# Patient Record
Sex: MALE | Race: BLACK OR AFRICAN AMERICAN
[De-identification: names, ages, dates, MRNs, and addresses within clinical notes are randomized per-mention and may not be internally consistent; named-entity substitution may affect disease eponyms.]

---

## 2018-02-04 ENCOUNTER — HOSPITAL ENCOUNTER (INPATIENT)
Dept: HOSPITAL 26 - MED | Age: 63
LOS: 2 days | Discharge: HOME | DRG: 205 | End: 2018-02-06
Payer: COMMERCIAL

## 2018-02-04 VITALS — SYSTOLIC BLOOD PRESSURE: 112 MMHG | DIASTOLIC BLOOD PRESSURE: 80 MMHG

## 2018-02-04 VITALS — SYSTOLIC BLOOD PRESSURE: 123 MMHG | DIASTOLIC BLOOD PRESSURE: 66 MMHG

## 2018-02-04 VITALS — BODY MASS INDEX: 47.74 KG/M2 | WEIGHT: 315 LBS | HEIGHT: 68 IN

## 2018-02-04 VITALS — SYSTOLIC BLOOD PRESSURE: 109 MMHG | DIASTOLIC BLOOD PRESSURE: 70 MMHG

## 2018-02-04 VITALS — SYSTOLIC BLOOD PRESSURE: 88 MMHG | DIASTOLIC BLOOD PRESSURE: 50 MMHG

## 2018-02-04 DIAGNOSIS — E83.39: ICD-10-CM

## 2018-02-04 DIAGNOSIS — F41.9: ICD-10-CM

## 2018-02-04 DIAGNOSIS — I25.10: ICD-10-CM

## 2018-02-04 DIAGNOSIS — M94.0: Primary | ICD-10-CM

## 2018-02-04 DIAGNOSIS — Z90.49: ICD-10-CM

## 2018-02-04 DIAGNOSIS — E78.00: ICD-10-CM

## 2018-02-04 DIAGNOSIS — N17.0: ICD-10-CM

## 2018-02-04 DIAGNOSIS — F41.0: ICD-10-CM

## 2018-02-04 DIAGNOSIS — Z87.891: ICD-10-CM

## 2018-02-04 DIAGNOSIS — I42.9: ICD-10-CM

## 2018-02-04 DIAGNOSIS — Z86.73: ICD-10-CM

## 2018-02-04 DIAGNOSIS — I25.2: ICD-10-CM

## 2018-02-04 DIAGNOSIS — E78.5: ICD-10-CM

## 2018-02-04 DIAGNOSIS — E66.9: ICD-10-CM

## 2018-02-04 DIAGNOSIS — R73.03: ICD-10-CM

## 2018-02-04 DIAGNOSIS — E87.0: ICD-10-CM

## 2018-02-04 DIAGNOSIS — I50.42: ICD-10-CM

## 2018-02-04 DIAGNOSIS — E43: ICD-10-CM

## 2018-02-04 DIAGNOSIS — F32.9: ICD-10-CM

## 2018-02-04 DIAGNOSIS — J44.9: ICD-10-CM

## 2018-02-04 DIAGNOSIS — Z71.3: ICD-10-CM

## 2018-02-04 DIAGNOSIS — I11.0: ICD-10-CM

## 2018-02-04 LAB
ALBUMIN FLD-MCNC: 3.1 G/DL (ref 3.4–5)
AMYLASE SERPL-CCNC: 104 U/L (ref 25–115)
ANION GAP SERPL CALCULATED.3IONS-SCNC: 14.1 MMOL/L (ref 8–16)
AST SERPL-CCNC: 16 U/L (ref 15–37)
BASOPHILS # BLD AUTO: 0.3 K/UL (ref 0–0.22)
BASOPHILS NFR BLD AUTO: 3.8 % (ref 0–2)
BILIRUB SERPL-MCNC: 0.3 MG/DL (ref 0–1)
BUN SERPL-MCNC: 11 MG/DL (ref 7–18)
CHLORIDE SERPL-SCNC: 111 MMOL/L (ref 98–107)
CHOLEST/HDLC SERPL: 3.7 {RATIO} (ref 1–4.5)
CO2 SERPL-SCNC: 24.8 MMOL/L (ref 21–32)
CREAT SERPL-MCNC: 1.1 MG/DL (ref 0.7–1.3)
EOSINOPHIL # BLD AUTO: 0.1 K/UL (ref 0–0.4)
EOSINOPHIL NFR BLD AUTO: 1.6 % (ref 0–4)
ERYTHROCYTE [DISTWIDTH] IN BLOOD BY AUTOMATED COUNT: 13.4 % (ref 11.6–13.7)
GFR SERPL CREATININE-BSD FRML MDRD: 87 ML/MIN (ref 90–?)
GLUCOSE SERPL-MCNC: 90 MG/DL (ref 74–106)
HCT VFR BLD AUTO: 42 % (ref 36–52)
HDLC SERPL-MCNC: 48 MG/DL (ref 40–60)
HGB BLD-MCNC: 13.4 G/DL (ref 12–18)
LDLC SERPL CALC-MCNC: 113 MG/DL (ref 60–100)
LIPASE SERPL-CCNC: 118 U/L (ref 73–393)
LYMPHOCYTES # BLD AUTO: 2.2 K/UL (ref 2–11.5)
LYMPHOCYTES NFR BLD AUTO: 26.7 % (ref 20.5–51.1)
MAGNESIUM SERPL-MCNC: 2 MG/DL (ref 1.8–2.4)
MCH RBC QN AUTO: 28 PG (ref 27–31)
MCHC RBC AUTO-ENTMCNC: 32 G/DL (ref 33–37)
MCV RBC AUTO: 88 FL (ref 80–94)
MONOCYTES # BLD AUTO: 0.6 K/UL (ref 0.8–1)
MONOCYTES NFR BLD AUTO: 7.4 % (ref 1.7–9.3)
NEUTROPHILS # BLD AUTO: 5.1 K/UL (ref 1.8–7.7)
NEUTROPHILS NFR BLD AUTO: 60.5 % (ref 42.2–75.2)
PHOSPHATE SERPL-MCNC: 2.3 MG/DL (ref 2.5–4.9)
PLATELET # BLD AUTO: 207 K/UL (ref 140–450)
POTASSIUM SERPL-SCNC: 3.9 MMOL/L (ref 3.5–5.1)
PROTHROMBIN TIME: 10.4 SECS (ref 10.8–13.4)
RBC # BLD AUTO: 4.75 MIL/UL (ref 4.2–6.1)
SODIUM SERPL-SCNC: 146 MMOL/L (ref 136–145)
T4 FREE SERPL-MCNC: 0.9 NG/DL (ref 0.76–1.46)
TRIGL SERPL-MCNC: 77 MG/DL (ref 30–150)
TSH SERPL DL<=0.05 MIU/L-ACNC: 0.86 UIU/ML (ref 0.34–3.74)
WBC # BLD AUTO: 8.3 K/UL (ref 4.8–10.8)

## 2018-02-04 RX ADMIN — DOCUSATE SODIUM SCH MG: 100 CAPSULE, LIQUID FILLED ORAL at 20:23

## 2018-02-04 RX ADMIN — SODIUM CHLORIDE SCH MLS/HR: 9 INJECTION, SOLUTION INTRAVENOUS at 16:33

## 2018-02-04 NOTE — NUR
Patient will be admitted to care of DR SHAY . Admited to [facility].  Will go to 
room 111B. Belongings list completed.  Report to SALBADOR RENEE .

## 2018-02-04 NOTE — NUR
PATIENT PRESENTS TO ED WITH C/O CHEST PAIN . PT STATES HE HAS BEEN HAVING CHEST 
PAIN FOR 2 DAYS . PT DESCRIBES PAIN AS STABBING, RADIATING TO HIS LEFT UPPER 
EXTREMITIES. DENIES N/V/D; SKIN IS PINK/WARM/DRY; AAOX4 WITH EVEN AND STEADY 
GAIT; LUNGS CLEAR BL; HR EVEN AND REGULAR; PT DENIES ANY FEVER,  SOB, OR COUGH 
AT THIS TIME; PATIENT STATES PAIN OF 10/10 AT THIS TIME; VSS; PATIENT 
POSITIONED FOR COMFORT; HOB ELEVATED; BEDRAILS UP X2; BED DOWN. ER MD MADE 
AWARE OF PT STATUS.

## 2018-02-04 NOTE — NUR
PATIENT IS RESTING IN BED AWAKE ALERT ORIENTED VITALS SIGNS TAKEN B/P 78/42 HR58 I RECHECKED 
IT AGAIN ITS 88/50 HR 58 PATIENT COMPLAINS OF PAIN TO CHEST AND LT ARM. PATIENT HAS 
COMPLAINS OF SLIGHT SOB.I CHECKED PATIENT'S TROPONIN  LEVEL AND I CALLED RESIDENT AND 
INFORMED MD WEILE OF PATIENT'S CURRENT STATUS AS OF NOW, AND ALSO I QUESTIONED THE TIMING 
FOR THE TROPONIN PATIENT HAD ONE TODAY AND THE NEXT ONE IS SCHEDULED FOR TOMORROW AND 
PATIENT WAS DIAGNOSED WITH CHEST PAIN.MD WEILE SAID HE WILL TAKE A LOOK AT THE PATIENT AND 
SAID ITS OK TO PLACE OXYGEN ON THE PATIENT AT 2L FOR NOW.WILL CONTINUE TO MONITOR.

## 2018-02-04 NOTE — NUR
PATIENT ARRIVED ON UNIT VIA ER BED/GURNEY. ABLE TO AMBULATE FROM ER BED/GURNEY TO [facility] 
BED/GURNEY WITH STEADY GAIT. PATIENT IN STABLE CONDITION. RESPIRATIONS EVEN, UNLABORED, ON 
ROOM AIR. AAOX4, CALM, COOPERATIVE, SKIN COLOR APPROPRIATE TO ETHNICITY, WARM TO TOUCH. 
COMPLAINTS OF 8/10 SHARP WITH PRESSURE CHEST PAIN RADIATING TO LEFT ARM. PATIENT TOOK 1 
NITROGLYCERIN SUBLINGUAL BEFORE COMING TO ER, AND ER GAVE NITROGLYCERIN SUBLINGUAL X2, AND 
MORPHINE. PATIENT REPORTS THAT HELPED THE CHEST PAIN A LITTLE BIT. WILL NOTIFY THE ATTENDING 
MD. LUNGS CTA ON ALL LOBES. ABDOMEN SOFT, OBESE. SKIN IS INTACT THROUGHOUT BODY. IV SITE IS 
INTACT, PATENT, AND STARTED INFUSING IVF AS PER ORDERS. ORIENTED PATIENT TO ROOM. REVIEWED 
PLAN OF CARE WITH PATIENT. SAFETY MEASURES IN PLACE, CALL LIGHT WITHIN REACH. WILL CONTINUE 
TO MONITOR.

## 2018-02-04 NOTE — NUR
DR. SOLORIO AT BEDSIDE PERFORMING ASSESSMENT AND REVIEWING PLAN OF CARE WITH PATIENT. WILL 
CONTINUE TO MONITOR.

## 2018-02-04 NOTE — NUR
PATIENT REFUSED THE SECOND NITRO SL PATIENT STATES,"IT GIVES ME HEADACHES." PATIENT 
STATES,"I WANT SOMETHING FOR MY PAIN PATIENT STATES HIS CHEST PAIN WENT FROM A 10/10 TO 9/10 
BUT PATIENT STATES,"I NEED SOME PAIN MEDICATION THAT IS NOT NORCO BECAUSE IT DOES NOTHING 
FOR MY PAIN. I CALLED MD WEILE AND INFORMED HIM HE SAID HE WILL COME TO SEE THE PATIENT.

## 2018-02-04 NOTE — NUR
LABS DRAWN BY BRIDGER. PATIENT SMILING TALKING AND WATCHING TV. WHEN ASKED IF HIS STILL IN 
PAIN PATIENT STATES,"YES, I MAYBE SMILING BUT I'M STILL HAVING CHEST PAIN 9/10." I TOLD THE 
PATIENT THAT SALBADOR WOODALL WILL ADMINISTER THE MORPHINE IT HAS BEEN VERIFIED BY THE PHARMACY 
ALREADY. PATIENT VERBALIZES UNDERSTANDING.

## 2018-02-04 NOTE — NUR
PATIENT REFUSED TO TAKE HIS STOOL SOFTENER PATIENT STATES I HAVE A BOWEL MOVEMENT YESTERDAY 
AND TODAY.I DON'T NEED ONE.

## 2018-02-05 VITALS — SYSTOLIC BLOOD PRESSURE: 120 MMHG | DIASTOLIC BLOOD PRESSURE: 60 MMHG

## 2018-02-05 VITALS — DIASTOLIC BLOOD PRESSURE: 68 MMHG | SYSTOLIC BLOOD PRESSURE: 133 MMHG

## 2018-02-05 VITALS — SYSTOLIC BLOOD PRESSURE: 109 MMHG | DIASTOLIC BLOOD PRESSURE: 59 MMHG

## 2018-02-05 VITALS — DIASTOLIC BLOOD PRESSURE: 65 MMHG | SYSTOLIC BLOOD PRESSURE: 135 MMHG

## 2018-02-05 VITALS — DIASTOLIC BLOOD PRESSURE: 65 MMHG | SYSTOLIC BLOOD PRESSURE: 113 MMHG

## 2018-02-05 VITALS — SYSTOLIC BLOOD PRESSURE: 101 MMHG | DIASTOLIC BLOOD PRESSURE: 59 MMHG

## 2018-02-05 LAB
ANION GAP SERPL CALCULATED.3IONS-SCNC: 10.6 MMOL/L (ref 8–16)
APPEARANCE UR: CLEAR
BARBITURATES UR QL SCN: (no result) NG/ML
BASOPHILS # BLD AUTO: 0.2 K/UL (ref 0–0.22)
BASOPHILS NFR BLD AUTO: 2.3 % (ref 0–2)
BENZODIAZ UR QL SCN: (no result) NG/ML
BILIRUB UR QL STRIP: NEGATIVE
BUN SERPL-MCNC: 17 MG/DL (ref 7–18)
BZE UR QL SCN: (no result) NG/ML
CANNABINOIDS UR QL SCN: (no result) NG/ML
CHLORIDE SERPL-SCNC: 111 MMOL/L (ref 98–107)
CO2 SERPL-SCNC: 28.8 MMOL/L (ref 21–32)
COLOR UR: YELLOW
CREAT SERPL-MCNC: 1.4 MG/DL (ref 0.7–1.3)
EOSINOPHIL # BLD AUTO: 0.2 K/UL (ref 0–0.4)
EOSINOPHIL NFR BLD AUTO: 2.3 % (ref 0–4)
ERYTHROCYTE [DISTWIDTH] IN BLOOD BY AUTOMATED COUNT: 13.6 % (ref 11.6–13.7)
GFR SERPL CREATININE-BSD FRML MDRD: 66 ML/MIN (ref 90–?)
GLUCOSE SERPL-MCNC: 94 MG/DL (ref 74–106)
GLUCOSE UR STRIP-MCNC: NEGATIVE MG/DL
HCT VFR BLD AUTO: 38.7 % (ref 36–52)
HGB BLD-MCNC: 12.5 G/DL (ref 12–18)
HGB UR QL STRIP: NEGATIVE
HYALINE CASTS URNS QL MICRO: (no result) /LPF
LEUKOCYTE ESTERASE UR QL STRIP: NEGATIVE
LYMPHOCYTES # BLD AUTO: 2.3 K/UL (ref 2–11.5)
LYMPHOCYTES NFR BLD AUTO: 29.5 % (ref 20.5–51.1)
MAGNESIUM SERPL-MCNC: 2.1 MG/DL (ref 1.8–2.4)
MCH RBC QN AUTO: 29 PG (ref 27–31)
MCHC RBC AUTO-ENTMCNC: 32 G/DL (ref 33–37)
MCV RBC AUTO: 90 FL (ref 80–94)
MONOCYTES # BLD AUTO: 0.7 K/UL (ref 0.8–1)
MONOCYTES NFR BLD AUTO: 9 % (ref 1.7–9.3)
NEUTROPHILS # BLD AUTO: 4.5 K/UL (ref 1.8–7.7)
NEUTROPHILS NFR BLD AUTO: 56.9 % (ref 42.2–75.2)
NITRITE UR QL STRIP: NEGATIVE
OPIATES UR QL SCN: (no result) NG/ML
PCP UR QL SCN: (no result) NG/ML
PH UR STRIP: 5.5 [PH] (ref 5–9)
PHOSPHATE SERPL-MCNC: 3.9 MG/DL (ref 2.5–4.9)
PLATELET # BLD AUTO: 174 K/UL (ref 140–450)
POTASSIUM SERPL-SCNC: 4.4 MMOL/L (ref 3.5–5.1)
RBC # BLD AUTO: 4.32 MIL/UL (ref 4.2–6.1)
RBC #/AREA URNS HPF: (no result) /HPF (ref 0–5)
SODIUM SERPL-SCNC: 146 MMOL/L (ref 136–145)
WBC # BLD AUTO: 7.9 K/UL (ref 4.8–10.8)
WBC,URINE: (no result) /HPF (ref 0–5)

## 2018-02-05 RX ADMIN — MORPHINE SULFATE PRN MG: 2 INJECTION, SOLUTION INTRAMUSCULAR; INTRAVENOUS at 12:16

## 2018-02-05 RX ADMIN — Medication SCH MG: at 08:55

## 2018-02-05 RX ADMIN — SODIUM CHLORIDE SCH MLS/HR: 9 INJECTION, SOLUTION INTRAVENOUS at 10:45

## 2018-02-05 RX ADMIN — MORPHINE SULFATE PRN MG: 2 INJECTION, SOLUTION INTRAMUSCULAR; INTRAVENOUS at 15:57

## 2018-02-05 RX ADMIN — GABAPENTIN SCH MG: 100 CAPSULE ORAL at 21:07

## 2018-02-05 RX ADMIN — METOPROLOL SUCCINATE SCH MG: 50 TABLET, EXTENDED RELEASE ORAL at 09:00

## 2018-02-05 RX ADMIN — ATORVASTATIN CALCIUM SCH MG: 20 TABLET, FILM COATED ORAL at 08:55

## 2018-02-05 RX ADMIN — PANTOPRAZOLE SODIUM SCH MG: 40 TABLET, DELAYED RELEASE ORAL at 08:55

## 2018-02-05 RX ADMIN — MORPHINE SULFATE PRN MG: 2 INJECTION, SOLUTION INTRAMUSCULAR; INTRAVENOUS at 09:03

## 2018-02-05 RX ADMIN — Medication SCH MG: at 08:54

## 2018-02-05 RX ADMIN — PANTOPRAZOLE SODIUM SCH MG: 40 TABLET, DELAYED RELEASE ORAL at 21:08

## 2018-02-05 RX ADMIN — DOCUSATE SODIUM SCH MG: 100 CAPSULE, LIQUID FILLED ORAL at 09:00

## 2018-02-05 RX ADMIN — LISINOPRIL SCH MG: 5 TABLET ORAL at 09:00

## 2018-02-05 RX ADMIN — MORPHINE SULFATE PRN MG: 2 INJECTION, SOLUTION INTRAMUSCULAR; INTRAVENOUS at 21:08

## 2018-02-05 RX ADMIN — CLOPIDOGREL BISULFATE SCH MG: 75 TABLET, FILM COATED ORAL at 08:54

## 2018-02-05 RX ADMIN — DOCUSATE SODIUM SCH MG: 100 CAPSULE, LIQUID FILLED ORAL at 21:06

## 2018-02-05 RX ADMIN — GABAPENTIN SCH MG: 100 CAPSULE ORAL at 08:56

## 2018-02-05 NOTE — NUR
2/5/2018

RD INITIAL ASSESSMENT COMPLETED



PLEASE REFER TO NUTRITION ASSESSMENT UNDER CARE ACTIVITY FOR ESTIMATED NUTRITIONAL NEEDS. 



CONTINUE CARDIAC DIET AS TOLERATED 



RD TO PROVIDE NUTRITION EDUCATION AND ENCOURAGE HEART-HEALTHY DIET CHOICES (COMPLETED) 



RD TO FOLLOW-UP IN 2-3 DAYS PATIENT IS HIGH RISK.



LINDSAY HANKS, RD

## 2018-02-05 NOTE — NUR
PER MAURICIO , REF.NUMBER [id number]

RECEIVED A CALL FROM RUDDY FROM [facility].    THEY ARE TOTAL RISK.   FAX REVIEW [phone]   
PHONE [phone].   FAXED INITIAL REVIEW.

## 2018-02-05 NOTE — NUR
PATIENT IS CURRENTLY RESTING IN BED SLEEPING IVF INFUSING WELL IV SITE PATENT. WILL CONTINUE 
TO MONITOR.CALL LIGHT WITHIN REACH.

## 2018-02-05 NOTE — NUR
PATIENT SLEEPING NO DISTRESS MD INFORMED THAT PATIENT HASN'T VOIDED SINCE THE BEGINNING OF 
THE SHIFT AND ALSO I HAVEN'T BEEN ABLE TO COLLECT THE UDS.MD SAID ITS OK.NO NEW ORDER.

## 2018-02-05 NOTE — NUR
PATIENT HAS BEEN SCREENED AND CATEGORIZED AS HIGH NUTRITION RISK. PATIENT WILL BE SEEN 
WITHIN 1-2 DAYS OF ADMISSION.



2/4/19-2/5/18



LINDSAY HANKS RD

## 2018-02-05 NOTE — NUR
PATIENT CURRENTLY SLEEPING WELL VITAL SIGNS TAKEN. PATIENT HAS NO COMPLAINS OF CHEST PAIN AT 
THIS TIME.PATIENT REMINDED THAT WE NEED A UA SPEC WHEN HE VOIDS.PATIENT VERBALIZES 
UNDERSTANDING.SCD'S ON TO BOTH LOWER EXTREMITIES. CALL LIGHT WITHIN REACH.WILL CONTINUE TO 
MONITOR.

## 2018-02-05 NOTE — NUR
RECEIVED REPORT FROM NIGHT SHIFT NURSE. PATIENT IS AAOX4, RESPIRATORY EFFORT EVEN AND 
UNLABORED. NO SIGNS AND SYMPTOMS OF ACUTE DISTRESS NOTED AT THIS TIME. PATIENT COMPLAINING 
OF CHEST PAIN, OFFERED NITROSTAT BUT HE REFUSED, SAYS IT GIVES HIM A BAD HEADACHE. PREFERS 
IV PAIN MED. HAS IV TO RIGHT FOREARM 22G, INFUSING NORMAL SALINE AT 50ML/HR. SITE IS CLEAN, 
DRY, PATENT AND INTACT. DISCUSSED PLAN OF CARE WITH PATIENT AND HE VERBALIZED UNDERSTANDING. 
BED IN LOWEST POSITION, SIDERAILS UP X2, CALL LIGHT PLACED WITHIN REACH. WILL CONTINUE TO 
MONITOR.

## 2018-02-05 NOTE — NUR
RECEIVED PT FROM SILKE RN PT IS AAOX4 RT SIDE WEAKNESS IV ON RT ARM INFUSING WELL ON 
TELMETRY SR PT COME BACK TO SLEEP , DENIES ANY PAIN AT THIS TIME INITIAL ASSESSMENT DONE

## 2018-02-06 VITALS — DIASTOLIC BLOOD PRESSURE: 43 MMHG | SYSTOLIC BLOOD PRESSURE: 108 MMHG

## 2018-02-06 VITALS — DIASTOLIC BLOOD PRESSURE: 82 MMHG | SYSTOLIC BLOOD PRESSURE: 150 MMHG

## 2018-02-06 VITALS — DIASTOLIC BLOOD PRESSURE: 66 MMHG | SYSTOLIC BLOOD PRESSURE: 114 MMHG

## 2018-02-06 LAB
ANION GAP SERPL CALCULATED.3IONS-SCNC: 12.8 MMOL/L (ref 8–16)
BASOPHILS # BLD AUTO: 0.2 K/UL (ref 0–0.22)
BASOPHILS NFR BLD AUTO: 2.1 % (ref 0–2)
BUN SERPL-MCNC: 14 MG/DL (ref 7–18)
CHLORIDE SERPL-SCNC: 111 MMOL/L (ref 98–107)
CO2 SERPL-SCNC: 24.2 MMOL/L (ref 21–32)
CREAT SERPL-MCNC: 1 MG/DL (ref 0.7–1.3)
EOSINOPHIL # BLD AUTO: 0.2 K/UL (ref 0–0.4)
EOSINOPHIL NFR BLD AUTO: 2.1 % (ref 0–4)
ERYTHROCYTE [DISTWIDTH] IN BLOOD BY AUTOMATED COUNT: 13.3 % (ref 11.6–13.7)
GFR SERPL CREATININE-BSD FRML MDRD: 97 ML/MIN (ref 90–?)
GLUCOSE SERPL-MCNC: 91 MG/DL (ref 74–106)
HCT VFR BLD AUTO: 38.8 % (ref 36–52)
HGB BLD-MCNC: 12.7 G/DL (ref 12–18)
LYMPHOCYTES # BLD AUTO: 2.3 K/UL (ref 2–11.5)
LYMPHOCYTES NFR BLD AUTO: 25.1 % (ref 20.5–51.1)
MAGNESIUM SERPL-MCNC: 2.1 MG/DL (ref 1.8–2.4)
MCH RBC QN AUTO: 29 PG (ref 27–31)
MCHC RBC AUTO-ENTMCNC: 33 G/DL (ref 33–37)
MCV RBC AUTO: 88 FL (ref 80–94)
MONOCYTES # BLD AUTO: 0.7 K/UL (ref 0.8–1)
MONOCYTES NFR BLD AUTO: 7.9 % (ref 1.7–9.3)
NEUTROPHILS # BLD AUTO: 5.6 K/UL (ref 1.8–7.7)
NEUTROPHILS NFR BLD AUTO: 62.8 % (ref 42.2–75.2)
PHOSPHATE SERPL-MCNC: 3.2 MG/DL (ref 2.5–4.9)
PLATELET # BLD AUTO: 162 K/UL (ref 140–450)
POTASSIUM SERPL-SCNC: 4 MMOL/L (ref 3.5–5.1)
RBC # BLD AUTO: 4.39 MIL/UL (ref 4.2–6.1)
SODIUM SERPL-SCNC: 144 MMOL/L (ref 136–145)
WBC # BLD AUTO: 9 K/UL (ref 4.8–10.8)

## 2018-02-06 RX ADMIN — SODIUM CHLORIDE SCH MLS/HR: 9 INJECTION, SOLUTION INTRAVENOUS at 01:50

## 2018-02-06 RX ADMIN — DOCUSATE SODIUM SCH MG: 100 CAPSULE, LIQUID FILLED ORAL at 09:10

## 2018-02-06 RX ADMIN — CLOPIDOGREL BISULFATE SCH MG: 75 TABLET, FILM COATED ORAL at 09:09

## 2018-02-06 RX ADMIN — LISINOPRIL SCH MG: 5 TABLET ORAL at 09:09

## 2018-02-06 RX ADMIN — Medication SCH MG: at 09:10

## 2018-02-06 RX ADMIN — MORPHINE SULFATE PRN MG: 2 INJECTION, SOLUTION INTRAMUSCULAR; INTRAVENOUS at 01:54

## 2018-02-06 RX ADMIN — SODIUM CHLORIDE SCH MLS/HR: 9 INJECTION, SOLUTION INTRAVENOUS at 09:17

## 2018-02-06 RX ADMIN — Medication SCH MG: at 09:09

## 2018-02-06 RX ADMIN — ATORVASTATIN CALCIUM SCH MG: 20 TABLET, FILM COATED ORAL at 09:08

## 2018-02-06 RX ADMIN — PANTOPRAZOLE SODIUM SCH MG: 40 TABLET, DELAYED RELEASE ORAL at 09:08

## 2018-02-06 RX ADMIN — METOPROLOL SUCCINATE SCH MG: 50 TABLET, EXTENDED RELEASE ORAL at 09:00

## 2018-02-06 NOTE — NUR
PATIENT LYING IN BED SLEEPING, AROUSABLE BY VOICE. NO DISTRESS NOTED. DENIES ANY PAIN AT 
THIS TIME. SCHEDULED MEDICATIONS DUE GIVEN. SAFETY MEASURES IN PLACE, CALL LIGHT WITHIN 
REACH. WILL CONTINUE TO MONITOR.

## 2018-02-06 NOTE — NUR
PATIENT SITTING IN BED WATCHING TV. NO DISTRESS NOTED. DENIES ANY PAIN AT THIS TIME. PATIENT 
MADE AWARE THAT HE WILL BE DISCHARGED HOME TODAY. PATIENT VERBALIZED UNDERSTANDING AND 
REPORTED THAT HE WILL DRIVE HIMSELF HOME. DISCHARGE INSTRUCTIONS/EDUCATION GIVEN TO PATIENT 
IN PREFERRED LANGUAGE OF ENGLISH. FOLLOW-UP INSTRUCTIONS WITH MD AND OUTPATIENT CARDIOLOGIST 
EDUCATION PROVIDED, NEW/CHANGED MEDICATION REGIMEN, AND CARDIAC DIET REGIMEN PROVIDED. 
ANSWERED ALL OF PATIENT'S QUESTIONS REGARDING DISCHARGE. PATIENT VERBALIZED COMPLETE 
UNDERSTANDING. ID BANDS REMOVED. IV SITE REMOVED WITH MINIMAL BLOOD AND LUMEN COMPLETELY 
INTACT. PATIENT TO GET DRESSED THEN DISCHARGE TO HOME. WILL CONTINUE TO MONITOR.

## 2018-02-06 NOTE — NUR
RECEIVED REPORT FROM NIGHT SHIFT NURSE. PATIENT LYING DOWN IN BED COMFORTABLY, NO DISTRESS 
NOTED. DENIES ANY PAIN AT THIS TIME. RESPIRATIONS EVEN, UNLABORED, ON ROOM AIR. AAOX4, CALM, 
COOPERATIVE, SKIN COLOR APPROPRIATE TO ETHNICITY, WARM TO TOUCH. LUNGS CTA ON ALL LOBES. 
ABDOMEN SOFT, OBESE. SKIN IS INTACT THROUGHOUT BODY. IV SITE IS INTACT, PATENT, AND STARTED 
INFUSING IVF AS PER ORDERS. REVIEWED PLAN OF CARE WITH PATIENT. PATIENT VERBALIZED 
UNDERSTANDING. SAFETY MEASURES IN PLACE, CALL LIGHT WITHIN REACH. WILL CONTINUE TO MONITOR.

## 2018-02-06 NOTE — NUR
PATIENT ALL DRESSED UP AND READY TO GO HOME. ESCORTED PATIENT DOWN TO ER PARKING VIA 
WHEELCHAIR AS PER PATIENT REQUESTED. PATIENT ABLE TO AMBULATE FROM WHEELCHAIR TO OWN CAR. 
PATIENT DISCHARGED AT THIS TIME VIA HOME IN PRIVATE VEHICLE IN STABLE CONDITION.

## 2018-02-06 NOTE — NUR
FAXED CONCURRENT REVIEW TO [facility], INCLUDING DISCHARGE SUMMARY AND MUSIC NOTE [phone].  
 SPOKE WITH RUDDY, [phone] AND INFORMED HER OF THE DISCHARGE.   ALSO FAXED PSYCH 
CONSULT.



SPOKE WITH CAYETANO FROM [facility] AND INFORMED HER OF THE DISCHARGE.   FAXED 
REVIEW TO HER INCLUDING THE DISCHARGE SUMMARY.   [phone]   PHONE [phone].

## 2019-05-13 ENCOUNTER — HOSPITAL ENCOUNTER (EMERGENCY)
Dept: HOSPITAL 15 - ER | Age: 64
Discharge: HOME | End: 2019-05-13
Payer: COMMERCIAL

## 2019-05-13 VITALS — DIASTOLIC BLOOD PRESSURE: 85 MMHG | SYSTOLIC BLOOD PRESSURE: 172 MMHG

## 2019-05-13 VITALS — HEIGHT: 68 IN | WEIGHT: 300 LBS | BODY MASS INDEX: 45.47 KG/M2

## 2019-05-13 DIAGNOSIS — N18.9: ICD-10-CM

## 2019-05-13 DIAGNOSIS — F17.210: ICD-10-CM

## 2019-05-13 DIAGNOSIS — Z79.899: ICD-10-CM

## 2019-05-13 DIAGNOSIS — E11.22: ICD-10-CM

## 2019-05-13 DIAGNOSIS — I13.0: ICD-10-CM

## 2019-05-13 DIAGNOSIS — I50.9: ICD-10-CM

## 2019-05-13 DIAGNOSIS — Z88.6: ICD-10-CM

## 2019-05-13 DIAGNOSIS — R07.89: Primary | ICD-10-CM

## 2019-05-13 LAB
ALBUMIN SERPL-MCNC: 3.4 G/DL (ref 3.4–5)
ALP SERPL-CCNC: 89 U/L (ref 45–117)
ALT SERPL-CCNC: 24 U/L (ref 16–61)
ANION GAP SERPL CALCULATED.3IONS-SCNC: 8 MMOL/L (ref 5–15)
BILIRUB SERPL-MCNC: 0.2 MG/DL (ref 0.2–1)
BUN SERPL-MCNC: 18 MG/DL (ref 7–18)
BUN/CREAT SERPL: 14
CALCIUM SERPL-MCNC: 9 MG/DL (ref 8.5–10.1)
CHLORIDE SERPL-SCNC: 112 MMOL/L (ref 98–107)
CO2 SERPL-SCNC: 26 MMOL/L (ref 21–32)
GLUCOSE SERPL-MCNC: 85 MG/DL (ref 74–106)
HCT VFR BLD AUTO: 46.5 % (ref 41–53)
HGB BLD-MCNC: 15.3 G/DL (ref 13.5–17.5)
MAGNESIUM SERPL-MCNC: 2.4 MG/DL (ref 1.6–2.6)
MCH RBC QN AUTO: 30.3 PG (ref 28–32)
MCV RBC AUTO: 92.2 FL (ref 80–100)
NRBC BLD QL AUTO: 0.1 %
POTASSIUM SERPL-SCNC: 3.9 MMOL/L (ref 3.5–5.1)
PROT SERPL-MCNC: 8 G/DL (ref 6.4–8.2)
SODIUM SERPL-SCNC: 146 MMOL/L (ref 136–145)

## 2019-05-13 PROCEDURE — 84484 ASSAY OF TROPONIN QUANT: CPT

## 2019-05-13 PROCEDURE — 85025 COMPLETE CBC W/AUTO DIFF WBC: CPT

## 2019-05-13 PROCEDURE — 83735 ASSAY OF MAGNESIUM: CPT

## 2019-05-13 PROCEDURE — 80053 COMPREHEN METABOLIC PANEL: CPT

## 2019-05-13 PROCEDURE — 71046 X-RAY EXAM CHEST 2 VIEWS: CPT

## 2019-05-13 PROCEDURE — 36415 COLL VENOUS BLD VENIPUNCTURE: CPT

## 2019-05-13 PROCEDURE — 83880 ASSAY OF NATRIURETIC PEPTIDE: CPT

## 2019-05-13 PROCEDURE — 93005 ELECTROCARDIOGRAM TRACING: CPT

## 2019-05-22 ENCOUNTER — HOSPITAL ENCOUNTER (INPATIENT)
Dept: HOSPITAL 15 - TELE-CENTR | Age: 64
LOS: 2 days | Discharge: HOME | End: 2019-05-24
Payer: COMMERCIAL

## 2019-05-22 DIAGNOSIS — I25.119: ICD-10-CM

## 2019-05-22 DIAGNOSIS — R07.9: Primary | ICD-10-CM

## 2019-05-22 DIAGNOSIS — I13.0: ICD-10-CM

## 2019-05-22 DIAGNOSIS — N18.3: ICD-10-CM

## 2019-05-22 DIAGNOSIS — E66.01: ICD-10-CM

## 2019-06-06 ENCOUNTER — HOSPITAL ENCOUNTER (EMERGENCY)
Dept: HOSPITAL 15 - ER | Age: 64
Discharge: LEFT BEFORE DISCHARGE | End: 2019-06-06
Payer: COMMERCIAL

## 2019-06-06 VITALS — BODY MASS INDEX: 47.74 KG/M2 | WEIGHT: 315 LBS | HEIGHT: 68 IN

## 2019-06-06 VITALS — SYSTOLIC BLOOD PRESSURE: 215 MMHG | DIASTOLIC BLOOD PRESSURE: 106 MMHG

## 2019-06-06 DIAGNOSIS — R07.89: Primary | ICD-10-CM

## 2019-06-06 DIAGNOSIS — Z53.21: ICD-10-CM

## 2019-06-06 LAB
ALBUMIN SERPL-MCNC: 3.3 G/DL (ref 3.4–5)
ALP SERPL-CCNC: 80 U/L (ref 45–117)
ALT SERPL-CCNC: 22 U/L (ref 16–61)
ANION GAP SERPL CALCULATED.3IONS-SCNC: 8 MMOL/L (ref 5–15)
BILIRUB SERPL-MCNC: 0.6 MG/DL (ref 0.2–1)
BUN SERPL-MCNC: 13 MG/DL (ref 7–18)
BUN/CREAT SERPL: 11.3
CALCIUM SERPL-MCNC: 8.7 MG/DL (ref 8.5–10.1)
CHLORIDE SERPL-SCNC: 110 MMOL/L (ref 98–107)
CO2 SERPL-SCNC: 25 MMOL/L (ref 21–32)
GLUCOSE SERPL-MCNC: 98 MG/DL (ref 74–106)
HCT VFR BLD AUTO: 44.9 % (ref 41–53)
HGB BLD-MCNC: 15 G/DL (ref 13.5–17.5)
MAGNESIUM SERPL-MCNC: 2.4 MG/DL (ref 1.6–2.6)
MCH RBC QN AUTO: 30.5 PG (ref 28–32)
MCV RBC AUTO: 91.5 FL (ref 80–100)
NRBC BLD QL AUTO: 0.1 %
POTASSIUM SERPL-SCNC: 3.7 MMOL/L (ref 3.5–5.1)
PROT SERPL-MCNC: 7.8 G/DL (ref 6.4–8.2)
SODIUM SERPL-SCNC: 143 MMOL/L (ref 136–145)

## 2019-06-06 PROCEDURE — 36415 COLL VENOUS BLD VENIPUNCTURE: CPT

## 2019-06-06 PROCEDURE — 84484 ASSAY OF TROPONIN QUANT: CPT

## 2019-06-06 PROCEDURE — 71046 X-RAY EXAM CHEST 2 VIEWS: CPT

## 2019-06-06 PROCEDURE — 80053 COMPREHEN METABOLIC PANEL: CPT

## 2019-06-06 PROCEDURE — 85025 COMPLETE CBC W/AUTO DIFF WBC: CPT

## 2019-06-06 PROCEDURE — 93005 ELECTROCARDIOGRAM TRACING: CPT

## 2019-06-06 PROCEDURE — 83735 ASSAY OF MAGNESIUM: CPT

## 2019-06-21 ENCOUNTER — HOSPITAL ENCOUNTER (EMERGENCY)
Dept: HOSPITAL 15 - ER | Age: 64
Discharge: HOME | End: 2019-06-21
Payer: COMMERCIAL

## 2019-06-21 VITALS — WEIGHT: 260 LBS | BODY MASS INDEX: 39.4 KG/M2 | HEIGHT: 68 IN

## 2019-06-21 VITALS — SYSTOLIC BLOOD PRESSURE: 147 MMHG | DIASTOLIC BLOOD PRESSURE: 66 MMHG

## 2019-06-21 DIAGNOSIS — F17.210: ICD-10-CM

## 2019-06-21 DIAGNOSIS — Z98.61: ICD-10-CM

## 2019-06-21 DIAGNOSIS — I25.2: ICD-10-CM

## 2019-06-21 DIAGNOSIS — R07.89: Primary | ICD-10-CM

## 2019-06-21 DIAGNOSIS — J44.9: ICD-10-CM

## 2019-06-21 DIAGNOSIS — I13.0: ICD-10-CM

## 2019-06-21 DIAGNOSIS — Z90.89: ICD-10-CM

## 2019-06-21 DIAGNOSIS — E66.01: ICD-10-CM

## 2019-06-21 DIAGNOSIS — I50.9: ICD-10-CM

## 2019-06-21 DIAGNOSIS — E13.22: ICD-10-CM

## 2019-06-21 DIAGNOSIS — N18.9: ICD-10-CM

## 2019-06-21 DIAGNOSIS — Z79.899: ICD-10-CM

## 2019-06-21 LAB
ALBUMIN SERPL-MCNC: 3.2 G/DL (ref 3.4–5)
ALP SERPL-CCNC: 78 U/L (ref 45–117)
ALT SERPL-CCNC: 18 U/L (ref 16–61)
ANION GAP SERPL CALCULATED.3IONS-SCNC: 2 MMOL/L (ref 5–15)
BILIRUB SERPL-MCNC: 0.4 MG/DL (ref 0.2–1)
BUN SERPL-MCNC: 17 MG/DL (ref 7–18)
BUN/CREAT SERPL: 16.5
CALCIUM SERPL-MCNC: 8.5 MG/DL (ref 8.5–10.1)
CHLORIDE SERPL-SCNC: 116 MMOL/L (ref 98–107)
CO2 SERPL-SCNC: 26 MMOL/L (ref 21–32)
GLUCOSE SERPL-MCNC: 111 MG/DL (ref 74–106)
HCT VFR BLD AUTO: 43.8 % (ref 41–53)
HGB BLD-MCNC: 14.4 G/DL (ref 13.5–17.5)
MAGNESIUM SERPL-MCNC: 2.4 MG/DL (ref 1.6–2.6)
MCH RBC QN AUTO: 30.2 PG (ref 28–32)
MCV RBC AUTO: 91.7 FL (ref 80–100)
NRBC BLD QL AUTO: 0.1 %
POTASSIUM SERPL-SCNC: 3.9 MMOL/L (ref 3.5–5.1)
PROT SERPL-MCNC: 7.3 G/DL (ref 6.4–8.2)
SODIUM SERPL-SCNC: 144 MMOL/L (ref 136–145)

## 2019-06-21 PROCEDURE — 80053 COMPREHEN METABOLIC PANEL: CPT

## 2019-06-21 PROCEDURE — 83735 ASSAY OF MAGNESIUM: CPT

## 2019-06-21 PROCEDURE — 84484 ASSAY OF TROPONIN QUANT: CPT

## 2019-06-21 PROCEDURE — 99284 EMERGENCY DEPT VISIT MOD MDM: CPT

## 2019-06-21 PROCEDURE — 93005 ELECTROCARDIOGRAM TRACING: CPT

## 2019-06-21 PROCEDURE — 96374 THER/PROPH/DIAG INJ IV PUSH: CPT

## 2019-06-21 PROCEDURE — 94761 N-INVAS EAR/PLS OXIMETRY MLT: CPT

## 2019-06-21 PROCEDURE — 85025 COMPLETE CBC W/AUTO DIFF WBC: CPT

## 2019-06-21 PROCEDURE — 71045 X-RAY EXAM CHEST 1 VIEW: CPT

## 2019-06-21 PROCEDURE — 36415 COLL VENOUS BLD VENIPUNCTURE: CPT

## 2019-06-21 PROCEDURE — 96375 TX/PRO/DX INJ NEW DRUG ADDON: CPT

## 2019-07-04 ENCOUNTER — HOSPITAL ENCOUNTER (INPATIENT)
Dept: HOSPITAL 15 - TELE-WESTW | Age: 64
LOS: 2 days | Discharge: ANOTHER HOSPITAL | DRG: 303 | End: 2019-07-06
Attending: INTERNAL MEDICINE | Admitting: NURSE PRACTITIONER
Payer: COMMERCIAL

## 2019-07-04 VITALS — DIASTOLIC BLOOD PRESSURE: 69 MMHG | SYSTOLIC BLOOD PRESSURE: 138 MMHG

## 2019-07-04 VITALS — DIASTOLIC BLOOD PRESSURE: 94 MMHG | SYSTOLIC BLOOD PRESSURE: 164 MMHG

## 2019-07-04 VITALS — DIASTOLIC BLOOD PRESSURE: 96 MMHG | SYSTOLIC BLOOD PRESSURE: 166 MMHG

## 2019-07-04 VITALS — SYSTOLIC BLOOD PRESSURE: 119 MMHG | DIASTOLIC BLOOD PRESSURE: 75 MMHG

## 2019-07-04 VITALS — DIASTOLIC BLOOD PRESSURE: 75 MMHG | SYSTOLIC BLOOD PRESSURE: 119 MMHG

## 2019-07-04 DIAGNOSIS — G89.29: ICD-10-CM

## 2019-07-04 DIAGNOSIS — E44.1: ICD-10-CM

## 2019-07-04 DIAGNOSIS — I50.9: ICD-10-CM

## 2019-07-04 DIAGNOSIS — E66.01: ICD-10-CM

## 2019-07-04 DIAGNOSIS — E78.5: ICD-10-CM

## 2019-07-04 DIAGNOSIS — Z88.8: ICD-10-CM

## 2019-07-04 DIAGNOSIS — F11.20: ICD-10-CM

## 2019-07-04 DIAGNOSIS — Z82.49: ICD-10-CM

## 2019-07-04 DIAGNOSIS — Z79.02: ICD-10-CM

## 2019-07-04 DIAGNOSIS — Z81.8: ICD-10-CM

## 2019-07-04 DIAGNOSIS — I25.110: Primary | ICD-10-CM

## 2019-07-04 DIAGNOSIS — Z95.5: ICD-10-CM

## 2019-07-04 DIAGNOSIS — Z76.5: ICD-10-CM

## 2019-07-04 DIAGNOSIS — I11.0: ICD-10-CM

## 2019-07-04 DIAGNOSIS — J44.9: ICD-10-CM

## 2019-07-04 DIAGNOSIS — Z79.899: ICD-10-CM

## 2019-07-04 DIAGNOSIS — F32.9: ICD-10-CM

## 2019-07-04 DIAGNOSIS — Z82.3: ICD-10-CM

## 2019-07-04 DIAGNOSIS — Z79.82: ICD-10-CM

## 2019-07-04 PROCEDURE — 80053 COMPREHEN METABOLIC PANEL: CPT

## 2019-07-04 PROCEDURE — 36415 COLL VENOUS BLD VENIPUNCTURE: CPT

## 2019-07-04 PROCEDURE — 84484 ASSAY OF TROPONIN QUANT: CPT

## 2019-07-04 PROCEDURE — 87081 CULTURE SCREEN ONLY: CPT

## 2019-07-04 PROCEDURE — 85025 COMPLETE CBC W/AUTO DIFF WBC: CPT

## 2019-07-04 PROCEDURE — 83880 ASSAY OF NATRIURETIC PEPTIDE: CPT

## 2019-07-04 RX ADMIN — MORPHINE SULFATE PRN MG: 2 INJECTION, SOLUTION INTRAMUSCULAR; INTRAVENOUS at 11:21

## 2019-07-04 RX ADMIN — ALBUTEROL SULFATE SCH CAP: 90 AEROSOL, METERED RESPIRATORY (INHALATION) at 18:00

## 2019-07-04 RX ADMIN — ATORVASTATIN CALCIUM SCH MG: 20 TABLET, FILM COATED ORAL at 22:29

## 2019-07-04 RX ADMIN — ISOSORBIDE MONONITRATE SCH MG: 60 TABLET, EXTENDED RELEASE ORAL at 11:21

## 2019-07-04 RX ADMIN — MORPHINE SULFATE PRN MG: 2 INJECTION, SOLUTION INTRAMUSCULAR; INTRAVENOUS at 15:36

## 2019-07-04 RX ADMIN — LORAZEPAM PRN MG: 2 INJECTION, SOLUTION INTRAMUSCULAR; INTRAVENOUS at 22:31

## 2019-07-04 RX ADMIN — METOPROLOL TARTRATE SCH MG: 25 TABLET, FILM COATED ORAL at 22:30

## 2019-07-04 RX ADMIN — MORPHINE SULFATE PRN MG: 2 INJECTION, SOLUTION INTRAMUSCULAR; INTRAVENOUS at 19:58

## 2019-07-05 VITALS — SYSTOLIC BLOOD PRESSURE: 122 MMHG | DIASTOLIC BLOOD PRESSURE: 79 MMHG

## 2019-07-05 VITALS — SYSTOLIC BLOOD PRESSURE: 137 MMHG | DIASTOLIC BLOOD PRESSURE: 80 MMHG

## 2019-07-05 VITALS — SYSTOLIC BLOOD PRESSURE: 122 MMHG | DIASTOLIC BLOOD PRESSURE: 70 MMHG

## 2019-07-05 VITALS — SYSTOLIC BLOOD PRESSURE: 143 MMHG | DIASTOLIC BLOOD PRESSURE: 71 MMHG

## 2019-07-05 VITALS — DIASTOLIC BLOOD PRESSURE: 70 MMHG | SYSTOLIC BLOOD PRESSURE: 112 MMHG

## 2019-07-05 VITALS — SYSTOLIC BLOOD PRESSURE: 135 MMHG | DIASTOLIC BLOOD PRESSURE: 77 MMHG

## 2019-07-05 VITALS — DIASTOLIC BLOOD PRESSURE: 74 MMHG | SYSTOLIC BLOOD PRESSURE: 133 MMHG

## 2019-07-05 LAB
ALBUMIN SERPL-MCNC: 3.1 G/DL (ref 3.4–5)
ALP SERPL-CCNC: 79 U/L (ref 45–117)
ALT SERPL-CCNC: 20 U/L (ref 16–61)
ANION GAP SERPL CALCULATED.3IONS-SCNC: 7 MMOL/L (ref 5–15)
BILIRUB SERPL-MCNC: 0.9 MG/DL (ref 0.2–1)
BUN SERPL-MCNC: 22 MG/DL (ref 7–18)
BUN/CREAT SERPL: 19.1
CALCIUM SERPL-MCNC: 8.5 MG/DL (ref 8.5–10.1)
CHLORIDE SERPL-SCNC: 113 MMOL/L (ref 98–107)
CO2 SERPL-SCNC: 23 MMOL/L (ref 21–32)
GLUCOSE SERPL-MCNC: 92 MG/DL (ref 74–106)
HCT VFR BLD AUTO: 42.1 % (ref 41–53)
HGB BLD-MCNC: 14 G/DL (ref 13.5–17.5)
MCH RBC QN AUTO: 30.4 PG (ref 28–32)
MCV RBC AUTO: 91.7 FL (ref 80–100)
NRBC BLD QL AUTO: 0.1 %
POTASSIUM SERPL-SCNC: 4 MMOL/L (ref 3.5–5.1)
PROT SERPL-MCNC: 7 G/DL (ref 6.4–8.2)
SODIUM SERPL-SCNC: 143 MMOL/L (ref 136–145)

## 2019-07-05 RX ADMIN — MORPHINE SULFATE PRN MG: 2 INJECTION, SOLUTION INTRAMUSCULAR; INTRAVENOUS at 20:10

## 2019-07-05 RX ADMIN — LORAZEPAM PRN MG: 2 INJECTION, SOLUTION INTRAMUSCULAR; INTRAVENOUS at 22:11

## 2019-07-05 RX ADMIN — MORPHINE SULFATE PRN MG: 2 INJECTION, SOLUTION INTRAMUSCULAR; INTRAVENOUS at 12:07

## 2019-07-05 RX ADMIN — ATORVASTATIN CALCIUM SCH MG: 20 TABLET, FILM COATED ORAL at 22:10

## 2019-07-05 RX ADMIN — MORPHINE SULFATE PRN MG: 2 INJECTION, SOLUTION INTRAMUSCULAR; INTRAVENOUS at 16:32

## 2019-07-05 RX ADMIN — ISOSORBIDE MONONITRATE SCH MG: 60 TABLET, EXTENDED RELEASE ORAL at 09:46

## 2019-07-05 RX ADMIN — METOPROLOL TARTRATE SCH MG: 25 TABLET, FILM COATED ORAL at 22:11

## 2019-07-05 RX ADMIN — ALBUTEROL SULFATE SCH CAP: 90 AEROSOL, METERED RESPIRATORY (INHALATION) at 18:00

## 2019-07-05 RX ADMIN — MORPHINE SULFATE PRN MG: 2 INJECTION, SOLUTION INTRAMUSCULAR; INTRAVENOUS at 02:44

## 2019-07-05 RX ADMIN — MORPHINE SULFATE PRN MG: 2 INJECTION, SOLUTION INTRAMUSCULAR; INTRAVENOUS at 08:09

## 2019-07-05 RX ADMIN — METOPROLOL TARTRATE SCH MG: 25 TABLET, FILM COATED ORAL at 09:47

## 2019-07-06 RX ADMIN — MORPHINE SULFATE PRN MG: 2 INJECTION, SOLUTION INTRAMUSCULAR; INTRAVENOUS at 00:03

## 2019-08-06 ENCOUNTER — HOSPITAL ENCOUNTER (EMERGENCY)
Dept: HOSPITAL 15 - ER | Age: 64
Discharge: HOME | End: 2019-08-06
Payer: COMMERCIAL

## 2019-08-06 VITALS — DIASTOLIC BLOOD PRESSURE: 73 MMHG | SYSTOLIC BLOOD PRESSURE: 184 MMHG

## 2019-08-06 VITALS — HEIGHT: 68 IN | WEIGHT: 315 LBS | BODY MASS INDEX: 47.74 KG/M2

## 2019-08-06 DIAGNOSIS — Z79.01: ICD-10-CM

## 2019-08-06 DIAGNOSIS — I25.2: ICD-10-CM

## 2019-08-06 DIAGNOSIS — J44.9: ICD-10-CM

## 2019-08-06 DIAGNOSIS — N18.9: ICD-10-CM

## 2019-08-06 DIAGNOSIS — Z88.8: ICD-10-CM

## 2019-08-06 DIAGNOSIS — Z79.82: ICD-10-CM

## 2019-08-06 DIAGNOSIS — E78.00: ICD-10-CM

## 2019-08-06 DIAGNOSIS — F17.210: ICD-10-CM

## 2019-08-06 DIAGNOSIS — I50.9: ICD-10-CM

## 2019-08-06 DIAGNOSIS — Z79.899: ICD-10-CM

## 2019-08-06 DIAGNOSIS — I13.0: Primary | ICD-10-CM

## 2019-08-06 DIAGNOSIS — Z90.49: ICD-10-CM

## 2019-08-06 DIAGNOSIS — K21.9: ICD-10-CM

## 2019-08-06 DIAGNOSIS — E11.22: ICD-10-CM

## 2019-08-06 LAB
ALBUMIN SERPL-MCNC: 2.8 G/DL (ref 3.4–5)
ALP SERPL-CCNC: 90 U/L (ref 45–117)
ALT SERPL-CCNC: 17 U/L (ref 16–61)
ANION GAP SERPL CALCULATED.3IONS-SCNC: 8 MMOL/L (ref 5–15)
BILIRUB SERPL-MCNC: 0.3 MG/DL (ref 0.2–1)
BUN SERPL-MCNC: 15 MG/DL (ref 7–18)
BUN/CREAT SERPL: 12.9
CALCIUM SERPL-MCNC: 8.5 MG/DL (ref 8.5–10.1)
CHLORIDE SERPL-SCNC: 113 MMOL/L (ref 98–107)
CO2 SERPL-SCNC: 24 MMOL/L (ref 21–32)
GLUCOSE SERPL-MCNC: 129 MG/DL (ref 74–106)
HCT VFR BLD AUTO: 41.9 % (ref 41–53)
HGB BLD-MCNC: 13.6 G/DL (ref 13.5–17.5)
MAGNESIUM SERPL-MCNC: 2.3 MG/DL (ref 1.6–2.6)
MCH RBC QN AUTO: 29.5 PG (ref 28–32)
MCV RBC AUTO: 91 FL (ref 80–100)
NRBC BLD QL AUTO: 0 %
POTASSIUM SERPL-SCNC: 3.7 MMOL/L (ref 3.5–5.1)
PROT SERPL-MCNC: 7.1 G/DL (ref 6.4–8.2)
SODIUM SERPL-SCNC: 145 MMOL/L (ref 136–145)

## 2019-08-06 PROCEDURE — 83880 ASSAY OF NATRIURETIC PEPTIDE: CPT

## 2019-08-06 PROCEDURE — 96374 THER/PROPH/DIAG INJ IV PUSH: CPT

## 2019-08-06 PROCEDURE — 84484 ASSAY OF TROPONIN QUANT: CPT

## 2019-08-06 PROCEDURE — 94761 N-INVAS EAR/PLS OXIMETRY MLT: CPT

## 2019-08-06 PROCEDURE — 71045 X-RAY EXAM CHEST 1 VIEW: CPT

## 2019-08-06 PROCEDURE — 96375 TX/PRO/DX INJ NEW DRUG ADDON: CPT

## 2019-08-06 PROCEDURE — 83735 ASSAY OF MAGNESIUM: CPT

## 2019-08-06 PROCEDURE — 93005 ELECTROCARDIOGRAM TRACING: CPT

## 2019-08-06 PROCEDURE — 81001 URINALYSIS AUTO W/SCOPE: CPT

## 2019-08-06 PROCEDURE — 80053 COMPREHEN METABOLIC PANEL: CPT

## 2019-08-06 PROCEDURE — 85025 COMPLETE CBC W/AUTO DIFF WBC: CPT

## 2019-08-06 PROCEDURE — 36415 COLL VENOUS BLD VENIPUNCTURE: CPT

## 2019-08-06 PROCEDURE — 99284 EMERGENCY DEPT VISIT MOD MDM: CPT

## 2019-08-10 ENCOUNTER — HOSPITAL ENCOUNTER (INPATIENT)
Dept: HOSPITAL 15 - TELE-CENTR | Age: 64
LOS: 3 days | Discharge: HOME | DRG: 194 | End: 2019-08-13
Attending: INTERNAL MEDICINE | Admitting: NURSE PRACTITIONER
Payer: COMMERCIAL

## 2019-08-10 VITALS — BODY MASS INDEX: 47.74 KG/M2 | WEIGHT: 315 LBS | HEIGHT: 68 IN

## 2019-08-10 VITALS — SYSTOLIC BLOOD PRESSURE: 155 MMHG | DIASTOLIC BLOOD PRESSURE: 90 MMHG

## 2019-08-10 VITALS — DIASTOLIC BLOOD PRESSURE: 90 MMHG | SYSTOLIC BLOOD PRESSURE: 155 MMHG

## 2019-08-10 DIAGNOSIS — Z80.9: ICD-10-CM

## 2019-08-10 DIAGNOSIS — E78.5: ICD-10-CM

## 2019-08-10 DIAGNOSIS — Z79.02: ICD-10-CM
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
## 2019-08-10 DIAGNOSIS — Z79.899: ICD-10-CM

## 2019-08-10 DIAGNOSIS — Z83.3: ICD-10-CM

## 2019-08-10 DIAGNOSIS — G89.29: ICD-10-CM

## 2019-08-10 DIAGNOSIS — F32.9: ICD-10-CM

## 2019-08-10 DIAGNOSIS — T50.2X5A: ICD-10-CM

## 2019-08-10 DIAGNOSIS — J44.9: ICD-10-CM

## 2019-08-10 DIAGNOSIS — I25.10: ICD-10-CM

## 2019-08-10 DIAGNOSIS — Z95.5: ICD-10-CM

## 2019-08-10 DIAGNOSIS — Z81.8: ICD-10-CM

## 2019-08-10 DIAGNOSIS — F11.20: ICD-10-CM

## 2019-08-10 DIAGNOSIS — Z82.49: ICD-10-CM

## 2019-08-10 DIAGNOSIS — E66.01: ICD-10-CM

## 2019-08-10 DIAGNOSIS — Z88.8: ICD-10-CM

## 2019-08-10 DIAGNOSIS — I50.33: ICD-10-CM

## 2019-08-10 DIAGNOSIS — Z83.6: ICD-10-CM

## 2019-08-10 DIAGNOSIS — Z82.3: ICD-10-CM

## 2019-08-10 DIAGNOSIS — Z79.82: ICD-10-CM

## 2019-08-10 DIAGNOSIS — F41.9: ICD-10-CM

## 2019-08-10 DIAGNOSIS — G47.00: ICD-10-CM

## 2019-08-10 DIAGNOSIS — I11.0: Primary | ICD-10-CM

## 2019-08-10 DIAGNOSIS — Z81.1: ICD-10-CM

## 2019-08-10 DIAGNOSIS — Z86.73: ICD-10-CM

## 2019-08-10 PROCEDURE — 83880 ASSAY OF NATRIURETIC PEPTIDE: CPT

## 2019-08-10 PROCEDURE — 71045 X-RAY EXAM CHEST 1 VIEW: CPT

## 2019-08-10 PROCEDURE — 87081 CULTURE SCREEN ONLY: CPT

## 2019-08-10 PROCEDURE — 84484 ASSAY OF TROPONIN QUANT: CPT

## 2019-08-10 PROCEDURE — 36415 COLL VENOUS BLD VENIPUNCTURE: CPT

## 2019-08-10 PROCEDURE — 85025 COMPLETE CBC W/AUTO DIFF WBC: CPT

## 2019-08-10 PROCEDURE — 80048 BASIC METABOLIC PNL TOTAL CA: CPT

## 2019-08-11 VITALS — DIASTOLIC BLOOD PRESSURE: 71 MMHG | SYSTOLIC BLOOD PRESSURE: 125 MMHG

## 2019-08-11 VITALS — DIASTOLIC BLOOD PRESSURE: 90 MMHG | SYSTOLIC BLOOD PRESSURE: 164 MMHG

## 2019-08-11 VITALS — DIASTOLIC BLOOD PRESSURE: 90 MMHG | SYSTOLIC BLOOD PRESSURE: 155 MMHG

## 2019-08-11 VITALS — DIASTOLIC BLOOD PRESSURE: 73 MMHG | SYSTOLIC BLOOD PRESSURE: 133 MMHG

## 2019-08-11 VITALS — DIASTOLIC BLOOD PRESSURE: 92 MMHG | SYSTOLIC BLOOD PRESSURE: 152 MMHG

## 2019-08-11 VITALS — DIASTOLIC BLOOD PRESSURE: 87 MMHG | SYSTOLIC BLOOD PRESSURE: 153 MMHG

## 2019-08-11 VITALS — SYSTOLIC BLOOD PRESSURE: 161 MMHG | DIASTOLIC BLOOD PRESSURE: 87 MMHG

## 2019-08-11 LAB
ANION GAP SERPL CALCULATED.3IONS-SCNC: 9 MMOL/L (ref 5–15)
BUN SERPL-MCNC: 14 MG/DL (ref 7–18)
BUN/CREAT SERPL: 12.4
CALCIUM SERPL-MCNC: 8.3 MG/DL (ref 8.5–10.1)
CHLORIDE SERPL-SCNC: 108 MMOL/L (ref 98–107)
CO2 SERPL-SCNC: 27 MMOL/L (ref 21–32)
GLUCOSE SERPL-MCNC: 86 MG/DL (ref 74–106)
HCT VFR BLD AUTO: 40.4 % (ref 41–53)
HGB BLD-MCNC: 13.4 G/DL (ref 13.5–17.5)
MCH RBC QN AUTO: 30 PG (ref 28–32)
MCV RBC AUTO: 90.8 FL (ref 80–100)
NRBC BLD QL AUTO: 0 %
POTASSIUM SERPL-SCNC: 3.7 MMOL/L (ref 3.5–5.1)
SODIUM SERPL-SCNC: 144 MMOL/L (ref 136–145)

## 2019-08-11 RX ADMIN — MORPHINE SULFATE PRN MG: 2 INJECTION, SOLUTION INTRAMUSCULAR; INTRAVENOUS at 09:26

## 2019-08-11 RX ADMIN — ISOSORBIDE MONONITRATE SCH MG: 60 TABLET, EXTENDED RELEASE ORAL at 09:25

## 2019-08-11 RX ADMIN — METOPROLOL TARTRATE SCH MG: 25 TABLET, FILM COATED ORAL at 09:25

## 2019-08-11 RX ADMIN — BENAZEPRIL HYDROCHLORIDE SCH MG: 10 TABLET, FILM COATED ORAL at 09:26

## 2019-08-11 RX ADMIN — PANTOPRAZOLE SODIUM SCH MG: 40 TABLET, DELAYED RELEASE ORAL at 09:25

## 2019-08-11 RX ADMIN — MORPHINE SULFATE PRN MG: 2 INJECTION, SOLUTION INTRAMUSCULAR; INTRAVENOUS at 01:08

## 2019-08-11 RX ADMIN — MORPHINE SULFATE PRN MG: 2 INJECTION, SOLUTION INTRAMUSCULAR; INTRAVENOUS at 05:18

## 2019-08-11 RX ADMIN — POTASSIUM CHLORIDE SCH MEQ: 1500 TABLET, EXTENDED RELEASE ORAL at 14:25

## 2019-08-11 RX ADMIN — MORPHINE SULFATE PRN MG: 2 INJECTION, SOLUTION INTRAMUSCULAR; INTRAVENOUS at 22:23

## 2019-08-11 RX ADMIN — ATORVASTATIN CALCIUM SCH MG: 20 TABLET, FILM COATED ORAL at 22:17

## 2019-08-11 RX ADMIN — CLOPIDOGREL BISULFATE SCH MG: 75 TABLET ORAL at 09:24

## 2019-08-11 RX ADMIN — FUROSEMIDE SCH MG: 10 INJECTION, SOLUTION INTRAMUSCULAR; INTRAVENOUS at 18:07

## 2019-08-11 RX ADMIN — POTASSIUM CHLORIDE SCH MEQ: 1500 TABLET, EXTENDED RELEASE ORAL at 22:17

## 2019-08-11 RX ADMIN — METOPROLOL TARTRATE SCH MG: 25 TABLET, FILM COATED ORAL at 22:22

## 2019-08-11 RX ADMIN — MORPHINE SULFATE PRN MG: 2 INJECTION, SOLUTION INTRAMUSCULAR; INTRAVENOUS at 16:01

## 2019-08-12 VITALS — DIASTOLIC BLOOD PRESSURE: 76 MMHG | SYSTOLIC BLOOD PRESSURE: 112 MMHG

## 2019-08-12 VITALS — DIASTOLIC BLOOD PRESSURE: 89 MMHG | SYSTOLIC BLOOD PRESSURE: 158 MMHG

## 2019-08-12 VITALS — SYSTOLIC BLOOD PRESSURE: 158 MMHG | DIASTOLIC BLOOD PRESSURE: 89 MMHG

## 2019-08-12 VITALS — SYSTOLIC BLOOD PRESSURE: 126 MMHG | DIASTOLIC BLOOD PRESSURE: 55 MMHG

## 2019-08-12 VITALS — SYSTOLIC BLOOD PRESSURE: 128 MMHG | DIASTOLIC BLOOD PRESSURE: 74 MMHG

## 2019-08-12 VITALS — SYSTOLIC BLOOD PRESSURE: 136 MMHG | DIASTOLIC BLOOD PRESSURE: 74 MMHG

## 2019-08-12 LAB
ANION GAP SERPL CALCULATED.3IONS-SCNC: 5 MMOL/L (ref 5–15)
BUN SERPL-MCNC: 16 MG/DL (ref 7–18)
BUN/CREAT SERPL: 10.7
CALCIUM SERPL-MCNC: 8.9 MG/DL (ref 8.5–10.1)
CHLORIDE SERPL-SCNC: 107 MMOL/L (ref 98–107)
CO2 SERPL-SCNC: 30 MMOL/L (ref 21–32)
GLUCOSE SERPL-MCNC: 101 MG/DL (ref 74–106)
POTASSIUM SERPL-SCNC: 4 MMOL/L (ref 3.5–5.1)
SODIUM SERPL-SCNC: 142 MMOL/L (ref 136–145)

## 2019-08-12 RX ADMIN — METOPROLOL TARTRATE SCH MG: 25 TABLET, FILM COATED ORAL at 09:19

## 2019-08-12 RX ADMIN — METOPROLOL TARTRATE SCH MG: 25 TABLET, FILM COATED ORAL at 22:00

## 2019-08-12 RX ADMIN — FUROSEMIDE SCH MG: 10 INJECTION, SOLUTION INTRAMUSCULAR; INTRAVENOUS at 17:27

## 2019-08-12 RX ADMIN — MORPHINE SULFATE PRN MG: 2 INJECTION, SOLUTION INTRAMUSCULAR; INTRAVENOUS at 04:33

## 2019-08-12 RX ADMIN — POTASSIUM CHLORIDE SCH MEQ: 1500 TABLET, EXTENDED RELEASE ORAL at 10:48

## 2019-08-12 RX ADMIN — FUROSEMIDE SCH MG: 10 INJECTION, SOLUTION INTRAMUSCULAR; INTRAVENOUS at 06:33

## 2019-08-12 RX ADMIN — ATORVASTATIN CALCIUM SCH MG: 20 TABLET, FILM COATED ORAL at 22:08

## 2019-08-12 RX ADMIN — ISOSORBIDE MONONITRATE SCH MG: 60 TABLET, EXTENDED RELEASE ORAL at 09:18

## 2019-08-12 RX ADMIN — POTASSIUM CHLORIDE SCH MEQ: 1500 TABLET, EXTENDED RELEASE ORAL at 06:34

## 2019-08-12 RX ADMIN — BENAZEPRIL HYDROCHLORIDE SCH MG: 10 TABLET, FILM COATED ORAL at 09:18

## 2019-08-12 RX ADMIN — MORPHINE SULFATE PRN MG: 2 INJECTION, SOLUTION INTRAMUSCULAR; INTRAVENOUS at 17:27

## 2019-08-12 RX ADMIN — PANTOPRAZOLE SODIUM SCH MG: 40 TABLET, DELAYED RELEASE ORAL at 09:17

## 2019-08-12 RX ADMIN — MORPHINE SULFATE PRN MG: 2 INJECTION, SOLUTION INTRAMUSCULAR; INTRAVENOUS at 10:47

## 2019-08-12 RX ADMIN — CLOPIDOGREL BISULFATE SCH MG: 75 TABLET ORAL at 09:18

## 2019-08-12 RX ADMIN — POTASSIUM CHLORIDE SCH MEQ: 1500 TABLET, EXTENDED RELEASE ORAL at 22:07

## 2019-08-13 VITALS — DIASTOLIC BLOOD PRESSURE: 84 MMHG | SYSTOLIC BLOOD PRESSURE: 150 MMHG

## 2019-08-13 VITALS — DIASTOLIC BLOOD PRESSURE: 77 MMHG | SYSTOLIC BLOOD PRESSURE: 150 MMHG

## 2019-08-13 LAB
ANION GAP SERPL CALCULATED.3IONS-SCNC: 6 MMOL/L (ref 5–15)
BUN SERPL-MCNC: 20 MG/DL (ref 7–18)
BUN/CREAT SERPL: 12.1
CALCIUM SERPL-MCNC: 9.2 MG/DL (ref 8.5–10.1)
CHLORIDE SERPL-SCNC: 105 MMOL/L (ref 98–107)
CO2 SERPL-SCNC: 31 MMOL/L (ref 21–32)
GLUCOSE SERPL-MCNC: 102 MG/DL (ref 74–106)
POTASSIUM SERPL-SCNC: 4.6 MMOL/L (ref 3.5–5.1)
SODIUM SERPL-SCNC: 142 MMOL/L (ref 136–145)

## 2019-08-13 RX ADMIN — FUROSEMIDE SCH MG: 10 INJECTION, SOLUTION INTRAMUSCULAR; INTRAVENOUS at 06:33

## 2019-08-13 RX ADMIN — MORPHINE SULFATE PRN MG: 2 INJECTION, SOLUTION INTRAMUSCULAR; INTRAVENOUS at 06:34

## 2019-08-13 RX ADMIN — PANTOPRAZOLE SODIUM SCH MG: 40 TABLET, DELAYED RELEASE ORAL at 09:34

## 2019-08-13 RX ADMIN — BENAZEPRIL HYDROCHLORIDE SCH MG: 10 TABLET, FILM COATED ORAL at 09:34

## 2019-08-13 RX ADMIN — ISOSORBIDE MONONITRATE SCH MG: 60 TABLET, EXTENDED RELEASE ORAL at 09:35

## 2019-08-13 RX ADMIN — METOPROLOL TARTRATE SCH MG: 25 TABLET, FILM COATED ORAL at 09:35

## 2019-08-13 RX ADMIN — CLOPIDOGREL BISULFATE SCH MG: 75 TABLET ORAL at 09:35

## 2019-08-13 RX ADMIN — MORPHINE SULFATE PRN MG: 2 INJECTION, SOLUTION INTRAMUSCULAR; INTRAVENOUS at 00:29

## 2019-08-13 RX ADMIN — POTASSIUM CHLORIDE SCH MEQ: 1500 TABLET, EXTENDED RELEASE ORAL at 09:35

## 2019-09-05 ENCOUNTER — HOSPITAL ENCOUNTER (EMERGENCY)
Dept: HOSPITAL 15 - ER | Age: 64
Discharge: HOME | End: 2019-09-05
Payer: COMMERCIAL

## 2019-09-05 VITALS — DIASTOLIC BLOOD PRESSURE: 79 MMHG | SYSTOLIC BLOOD PRESSURE: 165 MMHG

## 2019-09-05 VITALS — BODY MASS INDEX: 47.74 KG/M2 | WEIGHT: 315 LBS | HEIGHT: 68 IN

## 2019-09-05 DIAGNOSIS — E11.22: ICD-10-CM

## 2019-09-05 DIAGNOSIS — Z90.89: ICD-10-CM

## 2019-09-05 DIAGNOSIS — Z90.49: ICD-10-CM

## 2019-09-05 DIAGNOSIS — Z98.61: ICD-10-CM

## 2019-09-05 DIAGNOSIS — I25.2: ICD-10-CM

## 2019-09-05 DIAGNOSIS — N18.9: ICD-10-CM

## 2019-09-05 DIAGNOSIS — R07.89: Primary | ICD-10-CM

## 2019-09-05 DIAGNOSIS — Z79.899: ICD-10-CM

## 2019-09-05 DIAGNOSIS — F17.210: ICD-10-CM

## 2019-09-05 DIAGNOSIS — I13.0: ICD-10-CM

## 2019-09-05 PROCEDURE — 93005 ELECTROCARDIOGRAM TRACING: CPT

## 2019-10-01 ENCOUNTER — HOSPITAL ENCOUNTER (INPATIENT)
Dept: HOSPITAL 15 - TELE-WESTW | Age: 64
LOS: 6 days | Discharge: HOME | DRG: 291 | End: 2019-10-07
Attending: INTERNAL MEDICINE | Admitting: NURSE PRACTITIONER
Payer: COMMERCIAL

## 2019-10-01 VITALS — WEIGHT: 315 LBS | BODY MASS INDEX: 50.62 KG/M2 | HEIGHT: 66 IN

## 2019-10-01 VITALS — DIASTOLIC BLOOD PRESSURE: 80 MMHG | SYSTOLIC BLOOD PRESSURE: 147 MMHG

## 2019-10-01 DIAGNOSIS — J98.11: ICD-10-CM

## 2019-10-01 DIAGNOSIS — I25.10: ICD-10-CM

## 2019-10-01 DIAGNOSIS — J44.1: ICD-10-CM

## 2019-10-01 DIAGNOSIS — I16.9: ICD-10-CM

## 2019-10-01 DIAGNOSIS — J20.9: ICD-10-CM

## 2019-10-01 DIAGNOSIS — Z82.5: ICD-10-CM

## 2019-10-01 DIAGNOSIS — J44.0: ICD-10-CM

## 2019-10-01 DIAGNOSIS — Z82.49: ICD-10-CM

## 2019-10-01 DIAGNOSIS — I25.118: ICD-10-CM

## 2019-10-01 DIAGNOSIS — Z79.82: ICD-10-CM

## 2019-10-01 DIAGNOSIS — Z86.73: ICD-10-CM

## 2019-10-01 DIAGNOSIS — I13.0: Primary | ICD-10-CM

## 2019-10-01 DIAGNOSIS — J45.901: ICD-10-CM

## 2019-10-01 DIAGNOSIS — Z79.02: ICD-10-CM

## 2019-10-01 DIAGNOSIS — J96.21: ICD-10-CM

## 2019-10-01 DIAGNOSIS — I25.2: ICD-10-CM

## 2019-10-01 DIAGNOSIS — E78.5: ICD-10-CM

## 2019-10-01 DIAGNOSIS — G47.00: ICD-10-CM

## 2019-10-01 DIAGNOSIS — N17.9: ICD-10-CM

## 2019-10-01 DIAGNOSIS — I50.43: ICD-10-CM

## 2019-10-01 DIAGNOSIS — G47.33: ICD-10-CM

## 2019-10-01 DIAGNOSIS — G89.4: ICD-10-CM

## 2019-10-01 DIAGNOSIS — Z82.3: ICD-10-CM

## 2019-10-01 DIAGNOSIS — I27.20: ICD-10-CM

## 2019-10-01 DIAGNOSIS — Z79.899: ICD-10-CM

## 2019-10-01 DIAGNOSIS — E66.01: ICD-10-CM

## 2019-10-01 DIAGNOSIS — N18.3: ICD-10-CM

## 2019-10-01 DIAGNOSIS — Z81.8: ICD-10-CM

## 2019-10-01 DIAGNOSIS — Z83.3: ICD-10-CM

## 2019-10-01 DIAGNOSIS — F11.20: ICD-10-CM

## 2019-10-01 DIAGNOSIS — Z95.5: ICD-10-CM

## 2019-10-01 LAB
ANION GAP SERPL CALCULATED.3IONS-SCNC: 9 MMOL/L (ref 5–15)
BUN SERPL-MCNC: 20 MG/DL (ref 7–18)
BUN/CREAT SERPL: 15
CALCIUM SERPL-MCNC: 8.6 MG/DL (ref 8.5–10.1)
CHLORIDE SERPL-SCNC: 114 MMOL/L (ref 98–107)
CO2 SERPL-SCNC: 23 MMOL/L (ref 21–32)
GLUCOSE SERPL-MCNC: 141 MG/DL (ref 74–106)
HCT VFR BLD AUTO: 41.3 % (ref 41–53)
HGB BLD-MCNC: 13.8 G/DL (ref 13.5–17.5)
MCH RBC QN AUTO: 30.2 PG (ref 28–32)
MCV RBC AUTO: 90.2 FL (ref 80–100)
NRBC BLD QL AUTO: 0 %
POTASSIUM SERPL-SCNC: 4.1 MMOL/L (ref 3.5–5.1)
SODIUM SERPL-SCNC: 146 MMOL/L (ref 136–145)

## 2019-10-01 PROCEDURE — 87804 INFLUENZA ASSAY W/OPTIC: CPT

## 2019-10-01 PROCEDURE — 36600 WITHDRAWAL OF ARTERIAL BLOOD: CPT

## 2019-10-01 PROCEDURE — 80053 COMPREHEN METABOLIC PANEL: CPT

## 2019-10-01 PROCEDURE — 71275 CT ANGIOGRAPHY CHEST: CPT

## 2019-10-01 PROCEDURE — 94640 AIRWAY INHALATION TREATMENT: CPT

## 2019-10-01 PROCEDURE — 82805 BLOOD GASES W/O2 SATURATION: CPT

## 2019-10-01 PROCEDURE — 93970 EXTREMITY STUDY: CPT

## 2019-10-01 PROCEDURE — 83036 HEMOGLOBIN GLYCOSYLATED A1C: CPT

## 2019-10-01 PROCEDURE — 71045 X-RAY EXAM CHEST 1 VIEW: CPT

## 2019-10-01 PROCEDURE — 78582 LUNG VENTILAT&PERFUS IMAGING: CPT

## 2019-10-01 PROCEDURE — 85025 COMPLETE CBC W/AUTO DIFF WBC: CPT

## 2019-10-01 PROCEDURE — 83880 ASSAY OF NATRIURETIC PEPTIDE: CPT

## 2019-10-01 PROCEDURE — 80048 BASIC METABOLIC PNL TOTAL CA: CPT

## 2019-10-01 PROCEDURE — 85379 FIBRIN DEGRADATION QUANT: CPT

## 2019-10-01 PROCEDURE — 71046 X-RAY EXAM CHEST 2 VIEWS: CPT

## 2019-10-01 PROCEDURE — 93306 TTE W/DOPPLER COMPLETE: CPT

## 2019-10-01 PROCEDURE — 84484 ASSAY OF TROPONIN QUANT: CPT

## 2019-10-01 PROCEDURE — 36415 COLL VENOUS BLD VENIPUNCTURE: CPT

## 2019-10-01 PROCEDURE — 84443 ASSAY THYROID STIM HORMONE: CPT

## 2019-10-01 RX ADMIN — CEFTRIAXONE SODIUM SCH MLS/HR: 1 INJECTION, POWDER, FOR SOLUTION INTRAMUSCULAR; INTRAVENOUS at 23:58

## 2019-10-01 RX ADMIN — MORPHINE SULFATE PRN MG: 4 INJECTION, SOLUTION INTRAMUSCULAR; INTRAVENOUS at 23:59

## 2019-10-02 VITALS — DIASTOLIC BLOOD PRESSURE: 72 MMHG | SYSTOLIC BLOOD PRESSURE: 132 MMHG

## 2019-10-02 VITALS — DIASTOLIC BLOOD PRESSURE: 82 MMHG | SYSTOLIC BLOOD PRESSURE: 152 MMHG

## 2019-10-02 VITALS — DIASTOLIC BLOOD PRESSURE: 88 MMHG | SYSTOLIC BLOOD PRESSURE: 173 MMHG

## 2019-10-02 VITALS — SYSTOLIC BLOOD PRESSURE: 145 MMHG | DIASTOLIC BLOOD PRESSURE: 76 MMHG

## 2019-10-02 VITALS — SYSTOLIC BLOOD PRESSURE: 117 MMHG | DIASTOLIC BLOOD PRESSURE: 65 MMHG

## 2019-10-02 VITALS — SYSTOLIC BLOOD PRESSURE: 123 MMHG | DIASTOLIC BLOOD PRESSURE: 63 MMHG

## 2019-10-02 VITALS — DIASTOLIC BLOOD PRESSURE: 71 MMHG | SYSTOLIC BLOOD PRESSURE: 127 MMHG

## 2019-10-02 RX ADMIN — MORPHINE SULFATE PRN MG: 4 INJECTION, SOLUTION INTRAMUSCULAR; INTRAVENOUS at 20:44

## 2019-10-02 RX ADMIN — ALBUTEROL SULFATE SCH MG: 2.5 SOLUTION RESPIRATORY (INHALATION) at 19:49

## 2019-10-02 RX ADMIN — IPRATROPIUM BROMIDE SCH MG: 0.5 SOLUTION RESPIRATORY (INHALATION) at 12:34

## 2019-10-02 RX ADMIN — SODIUM CHLORIDE SCH ML: 9 INJECTION INTRAMUSCULAR; INTRAVENOUS; SUBCUTANEOUS at 05:38

## 2019-10-02 RX ADMIN — MORPHINE SULFATE PRN MG: 4 INJECTION, SOLUTION INTRAMUSCULAR; INTRAVENOUS at 04:13

## 2019-10-02 RX ADMIN — RANOLAZINE SCH MG: 500 TABLET, FILM COATED, EXTENDED RELEASE ORAL at 22:08

## 2019-10-02 RX ADMIN — MORPHINE SULFATE PRN MG: 4 INJECTION, SOLUTION INTRAMUSCULAR; INTRAVENOUS at 16:34

## 2019-10-02 RX ADMIN — AZITHROMYCIN DIHYDRATE SCH MLS/HR: 500 INJECTION, POWDER, LYOPHILIZED, FOR SOLUTION INTRAVENOUS at 09:52

## 2019-10-02 RX ADMIN — ALBUTEROL SULFATE SCH MG: 2.5 SOLUTION RESPIRATORY (INHALATION) at 06:52

## 2019-10-02 RX ADMIN — POTASSIUM CHLORIDE SCH MEQ: 600 TABLET, FILM COATED, EXTENDED RELEASE ORAL at 22:07

## 2019-10-02 RX ADMIN — RANOLAZINE SCH MG: 500 TABLET, FILM COATED, EXTENDED RELEASE ORAL at 09:52

## 2019-10-02 RX ADMIN — HYDROCODONE BITARTRATE AND ACETAMINOPHEN PRN TAB: 5; 325 TABLET ORAL at 02:23

## 2019-10-02 RX ADMIN — ALBUTEROL SULFATE SCH MG: 2.5 SOLUTION RESPIRATORY (INHALATION) at 12:34

## 2019-10-02 RX ADMIN — ISOSORBIDE MONONITRATE SCH MG: 60 TABLET, EXTENDED RELEASE ORAL at 09:54

## 2019-10-02 RX ADMIN — MORPHINE SULFATE PRN MG: 4 INJECTION, SOLUTION INTRAMUSCULAR; INTRAVENOUS at 12:17

## 2019-10-02 RX ADMIN — METOPROLOL TARTRATE SCH MG: 25 TABLET, FILM COATED ORAL at 09:53

## 2019-10-02 RX ADMIN — MORPHINE SULFATE PRN MG: 4 INJECTION, SOLUTION INTRAMUSCULAR; INTRAVENOUS at 08:16

## 2019-10-02 RX ADMIN — CLOPIDOGREL BISULFATE SCH MG: 75 TABLET ORAL at 09:52

## 2019-10-02 RX ADMIN — SODIUM CHLORIDE PRN MG: 0.9 INJECTION, SOLUTION INTRAVENOUS at 06:59

## 2019-10-02 RX ADMIN — SODIUM CHLORIDE SCH ML: 9 INJECTION INTRAMUSCULAR; INTRAVENOUS; SUBCUTANEOUS at 22:07

## 2019-10-02 RX ADMIN — AZITHROMYCIN DIHYDRATE SCH MLS/HR: 500 INJECTION, POWDER, LYOPHILIZED, FOR SOLUTION INTRAVENOUS at 02:02

## 2019-10-02 RX ADMIN — SODIUM CHLORIDE SCH ML: 9 INJECTION INTRAMUSCULAR; INTRAVENOUS; SUBCUTANEOUS at 13:41

## 2019-10-02 RX ADMIN — IPRATROPIUM BROMIDE SCH MG: 0.5 SOLUTION RESPIRATORY (INHALATION) at 19:49

## 2019-10-02 RX ADMIN — FUROSEMIDE SCH MG: 20 TABLET ORAL at 17:34

## 2019-10-02 RX ADMIN — METOPROLOL TARTRATE SCH MG: 25 TABLET, FILM COATED ORAL at 22:00

## 2019-10-02 RX ADMIN — CEFTRIAXONE SODIUM SCH MLS/HR: 1 INJECTION, POWDER, FOR SOLUTION INTRAMUSCULAR; INTRAVENOUS at 08:46

## 2019-10-02 RX ADMIN — IPRATROPIUM BROMIDE SCH MG: 0.5 SOLUTION RESPIRATORY (INHALATION) at 06:52

## 2019-10-02 RX ADMIN — ENALAPRIL MALEATE SCH MG: 2.5 TABLET ORAL at 09:54

## 2019-10-03 VITALS — DIASTOLIC BLOOD PRESSURE: 85 MMHG | SYSTOLIC BLOOD PRESSURE: 147 MMHG

## 2019-10-03 VITALS — SYSTOLIC BLOOD PRESSURE: 150 MMHG | DIASTOLIC BLOOD PRESSURE: 81 MMHG

## 2019-10-03 VITALS — DIASTOLIC BLOOD PRESSURE: 67 MMHG | SYSTOLIC BLOOD PRESSURE: 145 MMHG

## 2019-10-03 VITALS — SYSTOLIC BLOOD PRESSURE: 142 MMHG | DIASTOLIC BLOOD PRESSURE: 78 MMHG

## 2019-10-03 VITALS — DIASTOLIC BLOOD PRESSURE: 80 MMHG | SYSTOLIC BLOOD PRESSURE: 141 MMHG

## 2019-10-03 VITALS — DIASTOLIC BLOOD PRESSURE: 78 MMHG | SYSTOLIC BLOOD PRESSURE: 142 MMHG

## 2019-10-03 LAB
ALBUMIN SERPL-MCNC: 3 G/DL (ref 3.4–5)
ALP SERPL-CCNC: 72 U/L (ref 45–117)
ALT SERPL-CCNC: 19 U/L (ref 16–61)
ANION GAP SERPL CALCULATED.3IONS-SCNC: 3 MMOL/L (ref 5–15)
BILIRUB SERPL-MCNC: 0.6 MG/DL (ref 0.2–1)
BUN SERPL-MCNC: 21 MG/DL (ref 7–18)
BUN/CREAT SERPL: 15.4
CALCIUM SERPL-MCNC: 8.5 MG/DL (ref 8.5–10.1)
CHLORIDE SERPL-SCNC: 113 MMOL/L (ref 98–107)
CO2 SERPL-SCNC: 29 MMOL/L (ref 21–32)
GLUCOSE SERPL-MCNC: 87 MG/DL (ref 74–106)
HCT VFR BLD AUTO: 39.5 % (ref 41–53)
HGB BLD-MCNC: 13 G/DL (ref 13.5–17.5)
MCH RBC QN AUTO: 30.5 PG (ref 28–32)
MCV RBC AUTO: 92.8 FL (ref 80–100)
NRBC BLD QL AUTO: 0 %
POTASSIUM SERPL-SCNC: 4.2 MMOL/L (ref 3.5–5.1)
PROT SERPL-MCNC: 6.9 G/DL (ref 6.4–8.2)
SODIUM SERPL-SCNC: 145 MMOL/L (ref 136–145)

## 2019-10-03 RX ADMIN — MORPHINE SULFATE PRN MG: 4 INJECTION, SOLUTION INTRAMUSCULAR; INTRAVENOUS at 13:36

## 2019-10-03 RX ADMIN — AZITHROMYCIN MONOHYDRATE SCH MG: 250 TABLET ORAL at 09:25

## 2019-10-03 RX ADMIN — MORPHINE SULFATE PRN MG: 4 INJECTION, SOLUTION INTRAMUSCULAR; INTRAVENOUS at 09:27

## 2019-10-03 RX ADMIN — SODIUM CHLORIDE SCH ML: 9 INJECTION INTRAMUSCULAR; INTRAVENOUS; SUBCUTANEOUS at 13:35

## 2019-10-03 RX ADMIN — RANOLAZINE SCH MG: 500 TABLET, FILM COATED, EXTENDED RELEASE ORAL at 09:36

## 2019-10-03 RX ADMIN — ALBUTEROL SULFATE SCH MG: 2.5 SOLUTION RESPIRATORY (INHALATION) at 06:53

## 2019-10-03 RX ADMIN — MORPHINE SULFATE PRN MG: 4 INJECTION, SOLUTION INTRAMUSCULAR; INTRAVENOUS at 18:11

## 2019-10-03 RX ADMIN — MORPHINE SULFATE PRN MG: 4 INJECTION, SOLUTION INTRAMUSCULAR; INTRAVENOUS at 02:49

## 2019-10-03 RX ADMIN — IPRATROPIUM BROMIDE SCH MG: 0.5 SOLUTION RESPIRATORY (INHALATION) at 12:48

## 2019-10-03 RX ADMIN — SODIUM CHLORIDE SCH ML: 9 INJECTION INTRAMUSCULAR; INTRAVENOUS; SUBCUTANEOUS at 22:08

## 2019-10-03 RX ADMIN — IPRATROPIUM BROMIDE SCH MG: 0.5 SOLUTION RESPIRATORY (INHALATION) at 18:23

## 2019-10-03 RX ADMIN — CLOPIDOGREL BISULFATE SCH MG: 75 TABLET ORAL at 09:25

## 2019-10-03 RX ADMIN — MORPHINE SULFATE PRN MG: 4 INJECTION, SOLUTION INTRAMUSCULAR; INTRAVENOUS at 22:12

## 2019-10-03 RX ADMIN — ALBUTEROL SULFATE SCH MG: 2.5 SOLUTION RESPIRATORY (INHALATION) at 12:48

## 2019-10-03 RX ADMIN — POTASSIUM CHLORIDE SCH MEQ: 600 TABLET, FILM COATED, EXTENDED RELEASE ORAL at 09:25

## 2019-10-03 RX ADMIN — CEFTRIAXONE SODIUM SCH MLS/HR: 1 INJECTION, POWDER, FOR SOLUTION INTRAMUSCULAR; INTRAVENOUS at 09:25

## 2019-10-03 RX ADMIN — FUROSEMIDE SCH MG: 20 TABLET ORAL at 05:36

## 2019-10-03 RX ADMIN — SODIUM CHLORIDE SCH ML: 9 INJECTION INTRAMUSCULAR; INTRAVENOUS; SUBCUTANEOUS at 13:36

## 2019-10-03 RX ADMIN — IPRATROPIUM BROMIDE SCH MG: 0.5 SOLUTION RESPIRATORY (INHALATION) at 06:53

## 2019-10-03 RX ADMIN — METOPROLOL TARTRATE SCH MG: 25 TABLET, FILM COATED ORAL at 09:36

## 2019-10-03 RX ADMIN — ALBUTEROL SULFATE SCH MG: 2.5 SOLUTION RESPIRATORY (INHALATION) at 18:23

## 2019-10-03 RX ADMIN — METOPROLOL TARTRATE SCH MG: 25 TABLET, FILM COATED ORAL at 22:00

## 2019-10-03 RX ADMIN — ENALAPRIL MALEATE SCH MG: 2.5 TABLET ORAL at 09:27

## 2019-10-03 RX ADMIN — ISOSORBIDE MONONITRATE SCH MG: 60 TABLET, EXTENDED RELEASE ORAL at 09:26

## 2019-10-03 RX ADMIN — RANOLAZINE SCH MG: 500 TABLET, FILM COATED, EXTENDED RELEASE ORAL at 22:08

## 2019-10-04 VITALS — DIASTOLIC BLOOD PRESSURE: 86 MMHG | SYSTOLIC BLOOD PRESSURE: 159 MMHG

## 2019-10-04 VITALS — DIASTOLIC BLOOD PRESSURE: 91 MMHG | SYSTOLIC BLOOD PRESSURE: 160 MMHG

## 2019-10-04 VITALS — SYSTOLIC BLOOD PRESSURE: 148 MMHG | DIASTOLIC BLOOD PRESSURE: 79 MMHG

## 2019-10-04 VITALS — DIASTOLIC BLOOD PRESSURE: 72 MMHG | SYSTOLIC BLOOD PRESSURE: 123 MMHG

## 2019-10-04 VITALS — SYSTOLIC BLOOD PRESSURE: 145 MMHG | DIASTOLIC BLOOD PRESSURE: 61 MMHG

## 2019-10-04 RX ADMIN — CLOPIDOGREL BISULFATE SCH MG: 75 TABLET ORAL at 09:42

## 2019-10-04 RX ADMIN — MORPHINE SULFATE PRN MG: 4 INJECTION, SOLUTION INTRAMUSCULAR; INTRAVENOUS at 18:36

## 2019-10-04 RX ADMIN — ENALAPRIL MALEATE SCH MG: 2.5 TABLET ORAL at 09:43

## 2019-10-04 RX ADMIN — METOPROLOL TARTRATE SCH MG: 25 TABLET, FILM COATED ORAL at 09:43

## 2019-10-04 RX ADMIN — IPRATROPIUM BROMIDE SCH MG: 0.5 SOLUTION RESPIRATORY (INHALATION) at 19:19

## 2019-10-04 RX ADMIN — ALBUTEROL SULFATE SCH MG: 2.5 SOLUTION RESPIRATORY (INHALATION) at 13:20

## 2019-10-04 RX ADMIN — MORPHINE SULFATE PRN MG: 4 INJECTION, SOLUTION INTRAMUSCULAR; INTRAVENOUS at 14:01

## 2019-10-04 RX ADMIN — SODIUM CHLORIDE SCH ML: 9 INJECTION INTRAMUSCULAR; INTRAVENOUS; SUBCUTANEOUS at 14:01

## 2019-10-04 RX ADMIN — ISOSORBIDE MONONITRATE SCH MG: 60 TABLET, EXTENDED RELEASE ORAL at 09:43

## 2019-10-04 RX ADMIN — SODIUM CHLORIDE SCH ML: 9 INJECTION INTRAMUSCULAR; INTRAVENOUS; SUBCUTANEOUS at 06:00

## 2019-10-04 RX ADMIN — SODIUM CHLORIDE SCH ML: 9 INJECTION INTRAMUSCULAR; INTRAVENOUS; SUBCUTANEOUS at 21:37

## 2019-10-04 RX ADMIN — ALBUTEROL SULFATE SCH MG: 2.5 SOLUTION RESPIRATORY (INHALATION) at 19:19

## 2019-10-04 RX ADMIN — MORPHINE SULFATE PRN MG: 4 INJECTION, SOLUTION INTRAMUSCULAR; INTRAVENOUS at 23:23

## 2019-10-04 RX ADMIN — MORPHINE SULFATE PRN MG: 4 INJECTION, SOLUTION INTRAMUSCULAR; INTRAVENOUS at 09:44

## 2019-10-04 RX ADMIN — METOPROLOL TARTRATE SCH MG: 25 TABLET, FILM COATED ORAL at 21:37

## 2019-10-04 RX ADMIN — AZITHROMYCIN MONOHYDRATE SCH MG: 250 TABLET ORAL at 09:42

## 2019-10-04 RX ADMIN — RANOLAZINE SCH MG: 500 TABLET, FILM COATED, EXTENDED RELEASE ORAL at 21:38

## 2019-10-04 RX ADMIN — IPRATROPIUM BROMIDE SCH MG: 0.5 SOLUTION RESPIRATORY (INHALATION) at 07:35

## 2019-10-04 RX ADMIN — IPRATROPIUM BROMIDE SCH MG: 0.5 SOLUTION RESPIRATORY (INHALATION) at 13:19

## 2019-10-04 RX ADMIN — SODIUM CHLORIDE PRN MG: 0.9 INJECTION, SOLUTION INTRAVENOUS at 10:54

## 2019-10-04 RX ADMIN — RANOLAZINE SCH MG: 500 TABLET, FILM COATED, EXTENDED RELEASE ORAL at 09:42

## 2019-10-04 RX ADMIN — ALBUTEROL SULFATE SCH MG: 2.5 SOLUTION RESPIRATORY (INHALATION) at 07:35

## 2019-10-04 RX ADMIN — CEFTRIAXONE SODIUM SCH MLS/HR: 1 INJECTION, POWDER, FOR SOLUTION INTRAMUSCULAR; INTRAVENOUS at 09:42

## 2019-10-04 RX ADMIN — MORPHINE SULFATE PRN MG: 4 INJECTION, SOLUTION INTRAMUSCULAR; INTRAVENOUS at 04:49

## 2019-10-05 VITALS — SYSTOLIC BLOOD PRESSURE: 167 MMHG | DIASTOLIC BLOOD PRESSURE: 95 MMHG

## 2019-10-05 VITALS — SYSTOLIC BLOOD PRESSURE: 176 MMHG | DIASTOLIC BLOOD PRESSURE: 97 MMHG

## 2019-10-05 VITALS — SYSTOLIC BLOOD PRESSURE: 155 MMHG | DIASTOLIC BLOOD PRESSURE: 83 MMHG

## 2019-10-05 VITALS — DIASTOLIC BLOOD PRESSURE: 54 MMHG | SYSTOLIC BLOOD PRESSURE: 148 MMHG

## 2019-10-05 VITALS — DIASTOLIC BLOOD PRESSURE: 75 MMHG | SYSTOLIC BLOOD PRESSURE: 152 MMHG

## 2019-10-05 VITALS — DIASTOLIC BLOOD PRESSURE: 69 MMHG | SYSTOLIC BLOOD PRESSURE: 140 MMHG

## 2019-10-05 VITALS — DIASTOLIC BLOOD PRESSURE: 79 MMHG | SYSTOLIC BLOOD PRESSURE: 148 MMHG

## 2019-10-05 VITALS — DIASTOLIC BLOOD PRESSURE: 78 MMHG | SYSTOLIC BLOOD PRESSURE: 171 MMHG

## 2019-10-05 LAB
ANION GAP SERPL CALCULATED.3IONS-SCNC: 5 MMOL/L (ref 5–15)
BUN SERPL-MCNC: 16 MG/DL (ref 7–18)
BUN/CREAT SERPL: 12.9
CALCIUM SERPL-MCNC: 8.6 MG/DL (ref 8.5–10.1)
CHLORIDE SERPL-SCNC: 110 MMOL/L (ref 98–107)
CO2 SERPL-SCNC: 28 MMOL/L (ref 21–32)
GLUCOSE SERPL-MCNC: 85 MG/DL (ref 74–106)
POTASSIUM SERPL-SCNC: 4 MMOL/L (ref 3.5–5.1)
SODIUM SERPL-SCNC: 143 MMOL/L (ref 136–145)

## 2019-10-05 RX ADMIN — ALBUTEROL SULFATE SCH MG: 2.5 SOLUTION RESPIRATORY (INHALATION) at 12:11

## 2019-10-05 RX ADMIN — SODIUM CHLORIDE PRN MG: 0.9 INJECTION, SOLUTION INTRAVENOUS at 10:57

## 2019-10-05 RX ADMIN — MORPHINE SULFATE PRN MG: 4 INJECTION, SOLUTION INTRAMUSCULAR; INTRAVENOUS at 12:35

## 2019-10-05 RX ADMIN — MORPHINE SULFATE PRN MG: 4 INJECTION, SOLUTION INTRAMUSCULAR; INTRAVENOUS at 17:09

## 2019-10-05 RX ADMIN — MORPHINE SULFATE PRN MG: 4 INJECTION, SOLUTION INTRAMUSCULAR; INTRAVENOUS at 21:29

## 2019-10-05 RX ADMIN — AZITHROMYCIN MONOHYDRATE SCH MG: 250 TABLET ORAL at 09:17

## 2019-10-05 RX ADMIN — METOPROLOL TARTRATE SCH MG: 25 TABLET, FILM COATED ORAL at 22:36

## 2019-10-05 RX ADMIN — CEFTRIAXONE SODIUM SCH MLS/HR: 1 INJECTION, POWDER, FOR SOLUTION INTRAMUSCULAR; INTRAVENOUS at 09:17

## 2019-10-05 RX ADMIN — IPRATROPIUM BROMIDE SCH MG: 0.5 SOLUTION RESPIRATORY (INHALATION) at 07:29

## 2019-10-05 RX ADMIN — ALBUTEROL SULFATE SCH MG: 2.5 SOLUTION RESPIRATORY (INHALATION) at 18:14

## 2019-10-05 RX ADMIN — MORPHINE SULFATE PRN MG: 4 INJECTION, SOLUTION INTRAMUSCULAR; INTRAVENOUS at 04:24

## 2019-10-05 RX ADMIN — IPRATROPIUM BROMIDE SCH MG: 0.5 SOLUTION RESPIRATORY (INHALATION) at 18:14

## 2019-10-05 RX ADMIN — ISOSORBIDE MONONITRATE SCH MG: 60 TABLET, EXTENDED RELEASE ORAL at 09:18

## 2019-10-05 RX ADMIN — MORPHINE SULFATE PRN MG: 4 INJECTION, SOLUTION INTRAMUSCULAR; INTRAVENOUS at 08:31

## 2019-10-05 RX ADMIN — ALBUTEROL SULFATE SCH MG: 2.5 SOLUTION RESPIRATORY (INHALATION) at 07:29

## 2019-10-05 RX ADMIN — CLOPIDOGREL BISULFATE SCH MG: 75 TABLET ORAL at 09:17

## 2019-10-05 RX ADMIN — IPRATROPIUM BROMIDE SCH MG: 0.5 SOLUTION RESPIRATORY (INHALATION) at 12:11

## 2019-10-05 RX ADMIN — SODIUM CHLORIDE SCH ML: 9 INJECTION INTRAMUSCULAR; INTRAVENOUS; SUBCUTANEOUS at 05:52

## 2019-10-05 RX ADMIN — RANOLAZINE SCH MG: 500 TABLET, FILM COATED, EXTENDED RELEASE ORAL at 21:28

## 2019-10-05 RX ADMIN — RANOLAZINE SCH MG: 500 TABLET, FILM COATED, EXTENDED RELEASE ORAL at 09:18

## 2019-10-05 RX ADMIN — SODIUM CHLORIDE SCH ML: 9 INJECTION INTRAMUSCULAR; INTRAVENOUS; SUBCUTANEOUS at 14:04

## 2019-10-05 RX ADMIN — METOPROLOL TARTRATE SCH MG: 25 TABLET, FILM COATED ORAL at 09:18

## 2019-10-05 RX ADMIN — SODIUM CHLORIDE SCH ML: 9 INJECTION INTRAMUSCULAR; INTRAVENOUS; SUBCUTANEOUS at 21:28

## 2019-10-06 VITALS — SYSTOLIC BLOOD PRESSURE: 155 MMHG | DIASTOLIC BLOOD PRESSURE: 69 MMHG

## 2019-10-06 VITALS — DIASTOLIC BLOOD PRESSURE: 77 MMHG | SYSTOLIC BLOOD PRESSURE: 144 MMHG

## 2019-10-06 VITALS — SYSTOLIC BLOOD PRESSURE: 146 MMHG | DIASTOLIC BLOOD PRESSURE: 80 MMHG

## 2019-10-06 VITALS — DIASTOLIC BLOOD PRESSURE: 94 MMHG | SYSTOLIC BLOOD PRESSURE: 156 MMHG

## 2019-10-06 VITALS — SYSTOLIC BLOOD PRESSURE: 132 MMHG | DIASTOLIC BLOOD PRESSURE: 74 MMHG

## 2019-10-06 RX ADMIN — ALBUTEROL SULFATE SCH MG: 2.5 SOLUTION RESPIRATORY (INHALATION) at 07:23

## 2019-10-06 RX ADMIN — ALBUTEROL SULFATE SCH MG: 2.5 SOLUTION RESPIRATORY (INHALATION) at 19:46

## 2019-10-06 RX ADMIN — METOPROLOL TARTRATE SCH MG: 25 TABLET, FILM COATED ORAL at 10:00

## 2019-10-06 RX ADMIN — HYDROCODONE BITARTRATE AND ACETAMINOPHEN PRN TAB: 5; 325 TABLET ORAL at 19:48

## 2019-10-06 RX ADMIN — MORPHINE SULFATE PRN MG: 4 INJECTION, SOLUTION INTRAMUSCULAR; INTRAVENOUS at 10:06

## 2019-10-06 RX ADMIN — ALBUTEROL SULFATE SCH MG: 2.5 SOLUTION RESPIRATORY (INHALATION) at 11:30

## 2019-10-06 RX ADMIN — HYDROCODONE BITARTRATE AND ACETAMINOPHEN PRN TAB: 5; 325 TABLET ORAL at 19:47

## 2019-10-06 RX ADMIN — CLOPIDOGREL BISULFATE SCH MG: 75 TABLET ORAL at 10:05

## 2019-10-06 RX ADMIN — MORPHINE SULFATE PRN MG: 4 INJECTION, SOLUTION INTRAMUSCULAR; INTRAVENOUS at 06:05

## 2019-10-06 RX ADMIN — RANOLAZINE SCH MG: 500 TABLET, FILM COATED, EXTENDED RELEASE ORAL at 10:05

## 2019-10-06 RX ADMIN — MORPHINE SULFATE PRN MG: 4 INJECTION, SOLUTION INTRAMUSCULAR; INTRAVENOUS at 22:04

## 2019-10-06 RX ADMIN — MORPHINE SULFATE PRN MG: 4 INJECTION, SOLUTION INTRAMUSCULAR; INTRAVENOUS at 14:06

## 2019-10-06 RX ADMIN — CEFTRIAXONE SODIUM SCH MLS/HR: 1 INJECTION, POWDER, FOR SOLUTION INTRAMUSCULAR; INTRAVENOUS at 10:04

## 2019-10-06 RX ADMIN — IPRATROPIUM BROMIDE SCH MG: 0.5 SOLUTION RESPIRATORY (INHALATION) at 11:30

## 2019-10-06 RX ADMIN — AZITHROMYCIN MONOHYDRATE SCH MG: 250 TABLET ORAL at 10:04

## 2019-10-06 RX ADMIN — RANOLAZINE SCH MG: 500 TABLET, FILM COATED, EXTENDED RELEASE ORAL at 22:04

## 2019-10-06 RX ADMIN — MORPHINE SULFATE PRN MG: 4 INJECTION, SOLUTION INTRAMUSCULAR; INTRAVENOUS at 18:04

## 2019-10-06 RX ADMIN — MORPHINE SULFATE PRN MG: 4 INJECTION, SOLUTION INTRAMUSCULAR; INTRAVENOUS at 02:05

## 2019-10-06 RX ADMIN — IPRATROPIUM BROMIDE SCH MG: 0.5 SOLUTION RESPIRATORY (INHALATION) at 07:23

## 2019-10-06 RX ADMIN — SODIUM CHLORIDE SCH ML: 9 INJECTION INTRAMUSCULAR; INTRAVENOUS; SUBCUTANEOUS at 22:13

## 2019-10-06 RX ADMIN — SODIUM CHLORIDE SCH ML: 9 INJECTION INTRAMUSCULAR; INTRAVENOUS; SUBCUTANEOUS at 06:04

## 2019-10-06 RX ADMIN — SODIUM CHLORIDE SCH ML: 9 INJECTION INTRAMUSCULAR; INTRAVENOUS; SUBCUTANEOUS at 14:06

## 2019-10-06 RX ADMIN — ISOSORBIDE MONONITRATE SCH MG: 60 TABLET, EXTENDED RELEASE ORAL at 10:04

## 2019-10-06 RX ADMIN — METOPROLOL TARTRATE SCH MG: 25 TABLET, FILM COATED ORAL at 22:00

## 2019-10-06 RX ADMIN — IPRATROPIUM BROMIDE SCH MG: 0.5 SOLUTION RESPIRATORY (INHALATION) at 19:46

## 2019-10-07 VITALS — DIASTOLIC BLOOD PRESSURE: 76 MMHG | SYSTOLIC BLOOD PRESSURE: 152 MMHG

## 2019-10-07 VITALS — SYSTOLIC BLOOD PRESSURE: 130 MMHG | DIASTOLIC BLOOD PRESSURE: 72 MMHG

## 2019-10-07 VITALS — DIASTOLIC BLOOD PRESSURE: 81 MMHG | SYSTOLIC BLOOD PRESSURE: 141 MMHG

## 2019-10-07 RX ADMIN — IPRATROPIUM BROMIDE SCH MG: 0.5 SOLUTION RESPIRATORY (INHALATION) at 12:00

## 2019-10-07 RX ADMIN — MORPHINE SULFATE PRN MG: 4 INJECTION, SOLUTION INTRAMUSCULAR; INTRAVENOUS at 02:06

## 2019-10-07 RX ADMIN — SODIUM CHLORIDE SCH ML: 9 INJECTION INTRAMUSCULAR; INTRAVENOUS; SUBCUTANEOUS at 14:09

## 2019-10-07 RX ADMIN — MORPHINE SULFATE PRN MG: 4 INJECTION, SOLUTION INTRAMUSCULAR; INTRAVENOUS at 06:01

## 2019-10-07 RX ADMIN — RANOLAZINE SCH MG: 500 TABLET, FILM COATED, EXTENDED RELEASE ORAL at 09:58

## 2019-10-07 RX ADMIN — SODIUM CHLORIDE SCH ML: 9 INJECTION INTRAMUSCULAR; INTRAVENOUS; SUBCUTANEOUS at 06:01

## 2019-10-07 RX ADMIN — ALBUTEROL SULFATE SCH MG: 2.5 SOLUTION RESPIRATORY (INHALATION) at 12:00

## 2019-10-07 RX ADMIN — CEFTRIAXONE SODIUM SCH MLS/HR: 1 INJECTION, POWDER, FOR SOLUTION INTRAMUSCULAR; INTRAVENOUS at 09:24

## 2019-10-07 RX ADMIN — MORPHINE SULFATE PRN MG: 4 INJECTION, SOLUTION INTRAMUSCULAR; INTRAVENOUS at 09:58

## 2019-10-07 RX ADMIN — AZITHROMYCIN MONOHYDRATE SCH MG: 250 TABLET ORAL at 09:26

## 2019-10-07 RX ADMIN — ALBUTEROL SULFATE SCH MG: 2.5 SOLUTION RESPIRATORY (INHALATION) at 07:13

## 2019-10-07 RX ADMIN — METOPROLOL TARTRATE SCH MG: 25 TABLET, FILM COATED ORAL at 09:27

## 2019-10-07 RX ADMIN — IPRATROPIUM BROMIDE SCH MG: 0.5 SOLUTION RESPIRATORY (INHALATION) at 07:13

## 2019-10-07 RX ADMIN — ISOSORBIDE MONONITRATE SCH MG: 60 TABLET, EXTENDED RELEASE ORAL at 09:25

## 2019-10-07 RX ADMIN — CLOPIDOGREL BISULFATE SCH MG: 75 TABLET ORAL at 09:25

## 2020-06-08 ENCOUNTER — HOSPITAL ENCOUNTER (INPATIENT)
Dept: HOSPITAL 15 - ER | Age: 65
LOS: 3 days | Discharge: ANOTHER HOSPITAL | DRG: 291 | End: 2020-06-11
Attending: FAMILY MEDICINE | Admitting: INTERNAL MEDICINE
Payer: COMMERCIAL

## 2020-06-08 VITALS — SYSTOLIC BLOOD PRESSURE: 143 MMHG | DIASTOLIC BLOOD PRESSURE: 99 MMHG

## 2020-06-08 VITALS — HEIGHT: 69 IN | WEIGHT: 315 LBS | BODY MASS INDEX: 46.65 KG/M2

## 2020-06-08 VITALS — DIASTOLIC BLOOD PRESSURE: 100 MMHG | SYSTOLIC BLOOD PRESSURE: 168 MMHG

## 2020-06-08 VITALS — SYSTOLIC BLOOD PRESSURE: 164 MMHG | DIASTOLIC BLOOD PRESSURE: 72 MMHG

## 2020-06-08 DIAGNOSIS — I13.0: Primary | ICD-10-CM

## 2020-06-08 DIAGNOSIS — J44.1: ICD-10-CM

## 2020-06-08 DIAGNOSIS — J18.9: ICD-10-CM

## 2020-06-08 DIAGNOSIS — Z82.5: ICD-10-CM

## 2020-06-08 DIAGNOSIS — Z79.02: ICD-10-CM

## 2020-06-08 DIAGNOSIS — I27.20: ICD-10-CM

## 2020-06-08 DIAGNOSIS — Z79.82: ICD-10-CM

## 2020-06-08 DIAGNOSIS — F41.9: ICD-10-CM

## 2020-06-08 DIAGNOSIS — E78.5: ICD-10-CM

## 2020-06-08 DIAGNOSIS — N17.0: ICD-10-CM

## 2020-06-08 DIAGNOSIS — E11.22: ICD-10-CM

## 2020-06-08 DIAGNOSIS — Z82.49: ICD-10-CM

## 2020-06-08 DIAGNOSIS — I50.33: ICD-10-CM

## 2020-06-08 DIAGNOSIS — J44.0: ICD-10-CM

## 2020-06-08 DIAGNOSIS — Z81.8: ICD-10-CM

## 2020-06-08 DIAGNOSIS — F17.210: ICD-10-CM

## 2020-06-08 DIAGNOSIS — Z82.3: ICD-10-CM

## 2020-06-08 DIAGNOSIS — I25.118: ICD-10-CM

## 2020-06-08 DIAGNOSIS — G47.33: ICD-10-CM

## 2020-06-08 DIAGNOSIS — E66.01: ICD-10-CM

## 2020-06-08 DIAGNOSIS — Z83.3: ICD-10-CM

## 2020-06-08 DIAGNOSIS — N18.2: ICD-10-CM

## 2020-06-08 DIAGNOSIS — Z20.828: ICD-10-CM

## 2020-06-08 DIAGNOSIS — Z86.73: ICD-10-CM

## 2020-06-08 DIAGNOSIS — Z95.5: ICD-10-CM

## 2020-06-08 DIAGNOSIS — K21.9: ICD-10-CM

## 2020-06-08 DIAGNOSIS — I25.2: ICD-10-CM

## 2020-06-08 LAB
ALBUMIN SERPL-MCNC: 3.4 G/DL (ref 3.4–5)
ALCOHOL, URINE: < 3 MG/DL (ref 0–10)
ALP SERPL-CCNC: 92 U/L (ref 45–117)
ALT SERPL-CCNC: 17 U/L (ref 16–61)
AMPHETAMINES UR QL SCN: NEGATIVE
ANION GAP SERPL CALCULATED.3IONS-SCNC: 2 MMOL/L (ref 5–15)
BARBITURATES UR QL SCN: NEGATIVE
BENZODIAZ UR QL SCN: NEGATIVE
BILIRUB SERPL-MCNC: 0.4 MG/DL (ref 0.2–1)
BUN SERPL-MCNC: 19 MG/DL (ref 7–18)
BUN/CREAT SERPL: 13.9
BZE UR QL SCN: NEGATIVE
CALCIUM SERPL-MCNC: 8.5 MG/DL (ref 8.5–10.1)
CANNABINOIDS UR QL SCN: NEGATIVE
CHLORIDE SERPL-SCNC: 112 MMOL/L (ref 98–107)
CO2 SERPL-SCNC: 28 MMOL/L (ref 21–32)
GLUCOSE SERPL-MCNC: 112 MG/DL (ref 74–106)
HCT VFR BLD AUTO: 45.4 % (ref 41–53)
HGB BLD-MCNC: 14.8 G/DL (ref 13.5–17.5)
MCH RBC QN AUTO: 29.8 PG (ref 28–32)
MCV RBC AUTO: 91 FL (ref 80–100)
NRBC BLD QL AUTO: 0.1 %
OPIATES UR QL SCN: POSITIVE
PCP UR QL SCN: NEGATIVE
POTASSIUM SERPL-SCNC: 3.8 MMOL/L (ref 3.5–5.1)
PROT SERPL-MCNC: 7.6 G/DL (ref 6.4–8.2)
SODIUM SERPL-SCNC: 142 MMOL/L (ref 136–145)

## 2020-06-08 PROCEDURE — 85379 FIBRIN DEGRADATION QUANT: CPT

## 2020-06-08 PROCEDURE — 85025 COMPLETE CBC W/AUTO DIFF WBC: CPT

## 2020-06-08 PROCEDURE — 80053 COMPREHEN METABOLIC PANEL: CPT

## 2020-06-08 PROCEDURE — 87040 BLOOD CULTURE FOR BACTERIA: CPT

## 2020-06-08 PROCEDURE — 80307 DRUG TEST PRSMV CHEM ANLYZR: CPT

## 2020-06-08 PROCEDURE — 94640 AIRWAY INHALATION TREATMENT: CPT

## 2020-06-08 PROCEDURE — 93005 ELECTROCARDIOGRAM TRACING: CPT

## 2020-06-08 PROCEDURE — 83880 ASSAY OF NATRIURETIC PEPTIDE: CPT

## 2020-06-08 PROCEDURE — 81001 URINALYSIS AUTO W/SCOPE: CPT

## 2020-06-08 PROCEDURE — 71045 X-RAY EXAM CHEST 1 VIEW: CPT

## 2020-06-08 PROCEDURE — 84100 ASSAY OF PHOSPHORUS: CPT

## 2020-06-08 PROCEDURE — 84484 ASSAY OF TROPONIN QUANT: CPT

## 2020-06-08 PROCEDURE — 85610 PROTHROMBIN TIME: CPT

## 2020-06-08 PROCEDURE — 83735 ASSAY OF MAGNESIUM: CPT

## 2020-06-08 PROCEDURE — 83036 HEMOGLOBIN GLYCOSYLATED A1C: CPT

## 2020-06-08 PROCEDURE — 85730 THROMBOPLASTIN TIME PARTIAL: CPT

## 2020-06-08 PROCEDURE — 80061 LIPID PANEL: CPT

## 2020-06-08 PROCEDURE — 36415 COLL VENOUS BLD VENIPUNCTURE: CPT

## 2020-06-08 RX ADMIN — ATORVASTATIN CALCIUM SCH MG: 20 TABLET, FILM COATED ORAL at 22:41

## 2020-06-08 RX ADMIN — GABAPENTIN SCH MG: 100 CAPSULE ORAL at 22:41

## 2020-06-08 RX ADMIN — IPRATROPIUM BROMIDE SCH MG: 0.5 SOLUTION RESPIRATORY (INHALATION) at 19:18

## 2020-06-08 RX ADMIN — MORPHINE SULFATE PRN MG: 2 INJECTION, SOLUTION INTRAMUSCULAR; INTRAVENOUS at 20:52

## 2020-06-08 RX ADMIN — ALBUTEROL SULFATE SCH MG: 2.5 SOLUTION RESPIRATORY (INHALATION) at 22:08

## 2020-06-08 RX ADMIN — RANOLAZINE SCH MG: 500 TABLET, FILM COATED, EXTENDED RELEASE ORAL at 22:42

## 2020-06-08 RX ADMIN — IPRATROPIUM BROMIDE SCH MG: 0.5 SOLUTION RESPIRATORY (INHALATION) at 22:08

## 2020-06-08 RX ADMIN — METHYLPREDNISOLONE SODIUM SUCCINATE SCH MG: 40 INJECTION, POWDER, FOR SOLUTION INTRAMUSCULAR; INTRAVENOUS at 22:00

## 2020-06-08 RX ADMIN — FUROSEMIDE SCH MG: 10 INJECTION, SOLUTION INTRAMUSCULAR; INTRAVENOUS at 18:00

## 2020-06-08 RX ADMIN — ALBUTEROL SULFATE SCH MG: 2.5 SOLUTION RESPIRATORY (INHALATION) at 19:18

## 2020-06-09 VITALS — SYSTOLIC BLOOD PRESSURE: 155 MMHG | DIASTOLIC BLOOD PRESSURE: 91 MMHG

## 2020-06-09 VITALS — SYSTOLIC BLOOD PRESSURE: 108 MMHG | DIASTOLIC BLOOD PRESSURE: 66 MMHG

## 2020-06-09 VITALS — DIASTOLIC BLOOD PRESSURE: 72 MMHG | SYSTOLIC BLOOD PRESSURE: 141 MMHG

## 2020-06-09 VITALS — SYSTOLIC BLOOD PRESSURE: 118 MMHG | DIASTOLIC BLOOD PRESSURE: 76 MMHG

## 2020-06-09 VITALS — DIASTOLIC BLOOD PRESSURE: 83 MMHG | SYSTOLIC BLOOD PRESSURE: 156 MMHG

## 2020-06-09 LAB
ALBUMIN SERPL-MCNC: 3.1 G/DL (ref 3.4–5)
ALP SERPL-CCNC: 85 U/L (ref 45–117)
ALT SERPL-CCNC: 18 U/L (ref 16–61)
ANION GAP SERPL CALCULATED.3IONS-SCNC: 6 MMOL/L (ref 5–15)
APTT PPP: 27.3 SEC (ref 23.64–32.05)
BILIRUB SERPL-MCNC: 0.3 MG/DL (ref 0.2–1)
BUN SERPL-MCNC: 29 MG/DL (ref 7–18)
BUN/CREAT SERPL: 19.2
CALCIUM SERPL-MCNC: 8.4 MG/DL (ref 8.5–10.1)
CHLORIDE SERPL-SCNC: 110 MMOL/L (ref 98–107)
CHOLEST SERPL-MCNC: 201 MG/DL (ref ?–200)
CO2 SERPL-SCNC: 24 MMOL/L (ref 21–32)
GLUCOSE SERPL-MCNC: 190 MG/DL (ref 74–106)
HCT VFR BLD AUTO: 44.2 % (ref 41–53)
HDLC SERPL-MCNC: 45 MG/DL (ref 40–59)
HGB BLD-MCNC: 14.1 G/DL (ref 13.5–17.5)
INR PPP: 1.01 (ref 0.9–1.15)
MAGNESIUM SERPL-MCNC: 2.3 MG/DL (ref 1.6–2.6)
MCH RBC QN AUTO: 29 PG (ref 28–32)
MCV RBC AUTO: 91.1 FL (ref 80–100)
NRBC BLD QL AUTO: 0 %
POTASSIUM SERPL-SCNC: 4.1 MMOL/L (ref 3.5–5.1)
PROT SERPL-MCNC: 7.4 G/DL (ref 6.4–8.2)
SODIUM SERPL-SCNC: 140 MMOL/L (ref 136–145)
TRIGL SERPL-MCNC: 77 MG/DL (ref ?–150)

## 2020-06-09 RX ADMIN — IPRATROPIUM BROMIDE SCH MG: 0.5 SOLUTION RESPIRATORY (INHALATION) at 02:54

## 2020-06-09 RX ADMIN — IPRATROPIUM BROMIDE SCH MG: 0.5 SOLUTION RESPIRATORY (INHALATION) at 22:01

## 2020-06-09 RX ADMIN — MORPHINE SULFATE PRN MG: 2 INJECTION, SOLUTION INTRAMUSCULAR; INTRAVENOUS at 22:29

## 2020-06-09 RX ADMIN — ALBUTEROL SULFATE SCH MG: 2.5 SOLUTION RESPIRATORY (INHALATION) at 22:01

## 2020-06-09 RX ADMIN — MORPHINE SULFATE PRN MG: 2 INJECTION, SOLUTION INTRAMUSCULAR; INTRAVENOUS at 09:46

## 2020-06-09 RX ADMIN — ALBUTEROL SULFATE SCH MG: 2.5 SOLUTION RESPIRATORY (INHALATION) at 18:00

## 2020-06-09 RX ADMIN — METHYLPREDNISOLONE SODIUM SUCCINATE SCH MG: 40 INJECTION, POWDER, FOR SOLUTION INTRAMUSCULAR; INTRAVENOUS at 13:56

## 2020-06-09 RX ADMIN — FUROSEMIDE SCH MG: 10 INJECTION, SOLUTION INTRAMUSCULAR; INTRAVENOUS at 17:41

## 2020-06-09 RX ADMIN — FUROSEMIDE SCH MG: 10 INJECTION, SOLUTION INTRAMUSCULAR; INTRAVENOUS at 05:51

## 2020-06-09 RX ADMIN — ALBUTEROL SULFATE SCH MG: 2.5 SOLUTION RESPIRATORY (INHALATION) at 14:10

## 2020-06-09 RX ADMIN — ALBUTEROL SULFATE SCH MG: 2.5 SOLUTION RESPIRATORY (INHALATION) at 02:54

## 2020-06-09 RX ADMIN — CLOPIDOGREL BISULFATE SCH MG: 75 TABLET ORAL at 09:49

## 2020-06-09 RX ADMIN — ISOSORBIDE MONONITRATE SCH MG: 60 TABLET, EXTENDED RELEASE ORAL at 09:49

## 2020-06-09 RX ADMIN — METHYLPREDNISOLONE SODIUM SUCCINATE SCH MG: 40 INJECTION, POWDER, FOR SOLUTION INTRAMUSCULAR; INTRAVENOUS at 05:51

## 2020-06-09 RX ADMIN — ALBUTEROL SULFATE SCH MG: 2.5 SOLUTION RESPIRATORY (INHALATION) at 10:45

## 2020-06-09 RX ADMIN — MORPHINE SULFATE PRN MG: 2 INJECTION, SOLUTION INTRAMUSCULAR; INTRAVENOUS at 05:52

## 2020-06-09 RX ADMIN — RANOLAZINE SCH MG: 500 TABLET, FILM COATED, EXTENDED RELEASE ORAL at 22:28

## 2020-06-09 RX ADMIN — MORPHINE SULFATE PRN MG: 2 INJECTION, SOLUTION INTRAMUSCULAR; INTRAVENOUS at 01:15

## 2020-06-09 RX ADMIN — IPRATROPIUM BROMIDE SCH MG: 0.5 SOLUTION RESPIRATORY (INHALATION) at 06:58

## 2020-06-09 RX ADMIN — AZITHROMYCIN DIHYDRATE SCH MLS/HR: 500 INJECTION, POWDER, LYOPHILIZED, FOR SOLUTION INTRAVENOUS at 09:50

## 2020-06-09 RX ADMIN — ALBUTEROL SULFATE SCH MG: 2.5 SOLUTION RESPIRATORY (INHALATION) at 06:57

## 2020-06-09 RX ADMIN — IPRATROPIUM BROMIDE SCH MG: 0.5 SOLUTION RESPIRATORY (INHALATION) at 18:00

## 2020-06-09 RX ADMIN — GABAPENTIN SCH MG: 100 CAPSULE ORAL at 09:49

## 2020-06-09 RX ADMIN — ENOXAPARIN SODIUM SCH MG: 40 INJECTION SUBCUTANEOUS at 09:47

## 2020-06-09 RX ADMIN — METHYLPREDNISOLONE SODIUM SUCCINATE SCH MG: 40 INJECTION, POWDER, FOR SOLUTION INTRAMUSCULAR; INTRAVENOUS at 22:27

## 2020-06-09 RX ADMIN — MORPHINE SULFATE PRN MG: 2 INJECTION, SOLUTION INTRAMUSCULAR; INTRAVENOUS at 17:41

## 2020-06-09 RX ADMIN — IPRATROPIUM BROMIDE SCH MG: 0.5 SOLUTION RESPIRATORY (INHALATION) at 14:10

## 2020-06-09 RX ADMIN — MORPHINE SULFATE PRN MG: 2 INJECTION, SOLUTION INTRAMUSCULAR; INTRAVENOUS at 13:48

## 2020-06-09 RX ADMIN — GABAPENTIN SCH MG: 100 CAPSULE ORAL at 22:28

## 2020-06-09 RX ADMIN — IPRATROPIUM BROMIDE SCH MG: 0.5 SOLUTION RESPIRATORY (INHALATION) at 10:45

## 2020-06-09 RX ADMIN — RANOLAZINE SCH MG: 500 TABLET, FILM COATED, EXTENDED RELEASE ORAL at 09:49

## 2020-06-09 RX ADMIN — ASPIRIN SCH MG: 81 TABLET ORAL at 09:49

## 2020-06-09 RX ADMIN — ATORVASTATIN CALCIUM SCH MG: 20 TABLET, FILM COATED ORAL at 22:28

## 2020-06-10 VITALS — SYSTOLIC BLOOD PRESSURE: 142 MMHG | DIASTOLIC BLOOD PRESSURE: 65 MMHG

## 2020-06-10 VITALS — DIASTOLIC BLOOD PRESSURE: 80 MMHG | SYSTOLIC BLOOD PRESSURE: 139 MMHG

## 2020-06-10 VITALS — SYSTOLIC BLOOD PRESSURE: 144 MMHG | DIASTOLIC BLOOD PRESSURE: 69 MMHG

## 2020-06-10 VITALS — SYSTOLIC BLOOD PRESSURE: 129 MMHG | DIASTOLIC BLOOD PRESSURE: 92 MMHG

## 2020-06-10 VITALS — SYSTOLIC BLOOD PRESSURE: 133 MMHG | DIASTOLIC BLOOD PRESSURE: 73 MMHG

## 2020-06-10 RX ADMIN — METHYLPREDNISOLONE SODIUM SUCCINATE SCH MG: 40 INJECTION, POWDER, FOR SOLUTION INTRAMUSCULAR; INTRAVENOUS at 06:06

## 2020-06-10 RX ADMIN — ALBUTEROL SULFATE SCH MG: 2.5 SOLUTION RESPIRATORY (INHALATION) at 13:31

## 2020-06-10 RX ADMIN — GABAPENTIN SCH MG: 100 CAPSULE ORAL at 22:07

## 2020-06-10 RX ADMIN — ALBUTEROL SULFATE SCH MG: 2.5 SOLUTION RESPIRATORY (INHALATION) at 22:26

## 2020-06-10 RX ADMIN — ALBUTEROL SULFATE SCH MG: 2.5 SOLUTION RESPIRATORY (INHALATION) at 18:35

## 2020-06-10 RX ADMIN — METHYLPREDNISOLONE SODIUM SUCCINATE SCH MG: 40 INJECTION, POWDER, FOR SOLUTION INTRAMUSCULAR; INTRAVENOUS at 22:06

## 2020-06-10 RX ADMIN — IPRATROPIUM BROMIDE SCH MG: 0.5 SOLUTION RESPIRATORY (INHALATION) at 06:15

## 2020-06-10 RX ADMIN — METHYLPREDNISOLONE SODIUM SUCCINATE SCH MG: 40 INJECTION, POWDER, FOR SOLUTION INTRAMUSCULAR; INTRAVENOUS at 15:05

## 2020-06-10 RX ADMIN — ASPIRIN SCH MG: 81 TABLET ORAL at 10:47

## 2020-06-10 RX ADMIN — MORPHINE SULFATE PRN MG: 2 INJECTION, SOLUTION INTRAMUSCULAR; INTRAVENOUS at 10:49

## 2020-06-10 RX ADMIN — RANOLAZINE SCH MG: 500 TABLET, FILM COATED, EXTENDED RELEASE ORAL at 10:48

## 2020-06-10 RX ADMIN — ALBUTEROL SULFATE SCH MG: 2.5 SOLUTION RESPIRATORY (INHALATION) at 02:15

## 2020-06-10 RX ADMIN — IPRATROPIUM BROMIDE SCH MG: 0.5 SOLUTION RESPIRATORY (INHALATION) at 10:15

## 2020-06-10 RX ADMIN — MORPHINE SULFATE PRN MG: 2 INJECTION, SOLUTION INTRAMUSCULAR; INTRAVENOUS at 06:07

## 2020-06-10 RX ADMIN — IPRATROPIUM BROMIDE SCH MG: 0.5 SOLUTION RESPIRATORY (INHALATION) at 02:13

## 2020-06-10 RX ADMIN — MORPHINE SULFATE PRN MG: 2 INJECTION, SOLUTION INTRAMUSCULAR; INTRAVENOUS at 02:33

## 2020-06-10 RX ADMIN — FUROSEMIDE SCH MG: 10 INJECTION, SOLUTION INTRAMUSCULAR; INTRAVENOUS at 06:06

## 2020-06-10 RX ADMIN — MORPHINE SULFATE PRN MG: 2 INJECTION, SOLUTION INTRAMUSCULAR; INTRAVENOUS at 15:04

## 2020-06-10 RX ADMIN — IPRATROPIUM BROMIDE SCH MG: 0.5 SOLUTION RESPIRATORY (INHALATION) at 22:26

## 2020-06-10 RX ADMIN — MORPHINE SULFATE PRN MG: 2 INJECTION, SOLUTION INTRAMUSCULAR; INTRAVENOUS at 18:58

## 2020-06-10 RX ADMIN — GABAPENTIN SCH MG: 100 CAPSULE ORAL at 10:47

## 2020-06-10 RX ADMIN — ALBUTEROL SULFATE SCH MG: 2.5 SOLUTION RESPIRATORY (INHALATION) at 10:16

## 2020-06-10 RX ADMIN — ISOSORBIDE MONONITRATE SCH MG: 60 TABLET, EXTENDED RELEASE ORAL at 10:47

## 2020-06-10 RX ADMIN — IPRATROPIUM BROMIDE SCH MG: 0.5 SOLUTION RESPIRATORY (INHALATION) at 13:31

## 2020-06-10 RX ADMIN — ATORVASTATIN CALCIUM SCH MG: 20 TABLET, FILM COATED ORAL at 22:07

## 2020-06-10 RX ADMIN — IPRATROPIUM BROMIDE SCH MG: 0.5 SOLUTION RESPIRATORY (INHALATION) at 18:35

## 2020-06-10 RX ADMIN — RANOLAZINE SCH MG: 500 TABLET, FILM COATED, EXTENDED RELEASE ORAL at 22:07

## 2020-06-10 RX ADMIN — FUROSEMIDE SCH MG: 10 INJECTION, SOLUTION INTRAMUSCULAR; INTRAVENOUS at 18:53

## 2020-06-10 RX ADMIN — AZITHROMYCIN DIHYDRATE SCH MLS/HR: 500 INJECTION, POWDER, LYOPHILIZED, FOR SOLUTION INTRAVENOUS at 10:47

## 2020-06-10 RX ADMIN — ALBUTEROL SULFATE SCH MG: 2.5 SOLUTION RESPIRATORY (INHALATION) at 06:15

## 2020-06-10 RX ADMIN — ENOXAPARIN SODIUM SCH MG: 40 INJECTION SUBCUTANEOUS at 10:48

## 2020-06-10 RX ADMIN — MORPHINE SULFATE PRN MG: 2 INJECTION, SOLUTION INTRAMUSCULAR; INTRAVENOUS at 23:03

## 2020-06-10 RX ADMIN — CLOPIDOGREL BISULFATE SCH MG: 75 TABLET ORAL at 10:48

## 2020-06-11 VITALS — SYSTOLIC BLOOD PRESSURE: 149 MMHG | DIASTOLIC BLOOD PRESSURE: 72 MMHG

## 2020-06-11 VITALS — DIASTOLIC BLOOD PRESSURE: 73 MMHG | SYSTOLIC BLOOD PRESSURE: 146 MMHG

## 2020-06-11 VITALS — DIASTOLIC BLOOD PRESSURE: 95 MMHG | SYSTOLIC BLOOD PRESSURE: 157 MMHG

## 2020-06-11 RX ADMIN — GABAPENTIN SCH MG: 100 CAPSULE ORAL at 09:48

## 2020-06-11 RX ADMIN — ALBUTEROL SULFATE SCH MG: 2.5 SOLUTION RESPIRATORY (INHALATION) at 06:10

## 2020-06-11 RX ADMIN — ALBUTEROL SULFATE SCH MG: 2.5 SOLUTION RESPIRATORY (INHALATION) at 10:14

## 2020-06-11 RX ADMIN — ALBUTEROL SULFATE SCH MG: 2.5 SOLUTION RESPIRATORY (INHALATION) at 02:30

## 2020-06-11 RX ADMIN — METHYLPREDNISOLONE SODIUM SUCCINATE SCH MG: 40 INJECTION, POWDER, FOR SOLUTION INTRAMUSCULAR; INTRAVENOUS at 05:32

## 2020-06-11 RX ADMIN — ASPIRIN SCH MG: 81 TABLET ORAL at 09:48

## 2020-06-11 RX ADMIN — ENOXAPARIN SODIUM SCH MG: 40 INJECTION SUBCUTANEOUS at 09:50

## 2020-06-11 RX ADMIN — ISOSORBIDE MONONITRATE SCH MG: 60 TABLET, EXTENDED RELEASE ORAL at 09:47

## 2020-06-11 RX ADMIN — IPRATROPIUM BROMIDE SCH MG: 0.5 SOLUTION RESPIRATORY (INHALATION) at 10:14

## 2020-06-11 RX ADMIN — MORPHINE SULFATE PRN MG: 2 INJECTION, SOLUTION INTRAMUSCULAR; INTRAVENOUS at 08:16

## 2020-06-11 RX ADMIN — FUROSEMIDE SCH MG: 10 INJECTION, SOLUTION INTRAMUSCULAR; INTRAVENOUS at 05:32

## 2020-06-11 RX ADMIN — CLOPIDOGREL BISULFATE SCH MG: 75 TABLET ORAL at 09:49

## 2020-06-11 RX ADMIN — IPRATROPIUM BROMIDE SCH MG: 0.5 SOLUTION RESPIRATORY (INHALATION) at 02:30

## 2020-06-11 RX ADMIN — IPRATROPIUM BROMIDE SCH MG: 0.5 SOLUTION RESPIRATORY (INHALATION) at 06:10

## 2020-06-11 RX ADMIN — RANOLAZINE SCH MG: 500 TABLET, FILM COATED, EXTENDED RELEASE ORAL at 09:49

## 2020-06-11 RX ADMIN — MORPHINE SULFATE PRN MG: 2 INJECTION, SOLUTION INTRAMUSCULAR; INTRAVENOUS at 12:22

## 2020-06-11 RX ADMIN — MORPHINE SULFATE PRN MG: 2 INJECTION, SOLUTION INTRAMUSCULAR; INTRAVENOUS at 03:09

## 2020-08-18 ENCOUNTER — HOSPITAL ENCOUNTER (INPATIENT)
Dept: HOSPITAL 15 - ER | Age: 65
LOS: 3 days | Discharge: HOME | DRG: 313 | End: 2020-08-21
Attending: FAMILY MEDICINE | Admitting: NURSE PRACTITIONER
Payer: COMMERCIAL

## 2020-08-18 VITALS — HEIGHT: 68 IN | WEIGHT: 315 LBS | BODY MASS INDEX: 47.74 KG/M2

## 2020-08-18 DIAGNOSIS — R07.89: Primary | ICD-10-CM

## 2020-08-18 DIAGNOSIS — E66.01: ICD-10-CM

## 2020-08-18 DIAGNOSIS — E87.6: ICD-10-CM

## 2020-08-18 DIAGNOSIS — K21.9: ICD-10-CM

## 2020-08-18 DIAGNOSIS — Z82.3: ICD-10-CM

## 2020-08-18 DIAGNOSIS — I25.119: ICD-10-CM

## 2020-08-18 DIAGNOSIS — G47.30: ICD-10-CM

## 2020-08-18 DIAGNOSIS — Z95.5: ICD-10-CM

## 2020-08-18 DIAGNOSIS — F32.9: ICD-10-CM

## 2020-08-18 DIAGNOSIS — J44.1: ICD-10-CM

## 2020-08-18 DIAGNOSIS — E78.5: ICD-10-CM

## 2020-08-18 DIAGNOSIS — E11.9: ICD-10-CM

## 2020-08-18 DIAGNOSIS — F41.9: ICD-10-CM

## 2020-08-18 DIAGNOSIS — Z81.8: ICD-10-CM

## 2020-08-18 DIAGNOSIS — F17.210: ICD-10-CM

## 2020-08-18 DIAGNOSIS — N28.9: ICD-10-CM

## 2020-08-18 DIAGNOSIS — I16.0: ICD-10-CM

## 2020-08-18 DIAGNOSIS — Z82.49: ICD-10-CM

## 2020-08-18 DIAGNOSIS — I25.2: ICD-10-CM

## 2020-08-18 DIAGNOSIS — I50.33: ICD-10-CM

## 2020-08-18 DIAGNOSIS — I11.0: ICD-10-CM

## 2020-08-18 DIAGNOSIS — Z90.49: ICD-10-CM

## 2020-08-18 DIAGNOSIS — Z91.19: ICD-10-CM

## 2020-08-18 DIAGNOSIS — Z86.73: ICD-10-CM

## 2020-08-18 LAB
ALBUMIN SERPL-MCNC: 3.2 G/DL (ref 3.4–5)
ALP SERPL-CCNC: 82 U/L (ref 45–117)
ALT SERPL-CCNC: 17 U/L (ref 16–61)
ANION GAP SERPL CALCULATED.3IONS-SCNC: 4 MMOL/L (ref 5–15)
APTT PPP: 25.8 SEC (ref 23–31.2)
BILIRUB SERPL-MCNC: 0.4 MG/DL (ref 0.2–1)
BUN SERPL-MCNC: 15 MG/DL (ref 7–18)
BUN/CREAT SERPL: 12.6
CALCIUM SERPL-MCNC: 8.7 MG/DL (ref 8.5–10.1)
CHLORIDE SERPL-SCNC: 111 MMOL/L (ref 98–107)
CO2 SERPL-SCNC: 27 MMOL/L (ref 21–32)
GLUCOSE SERPL-MCNC: 97 MG/DL (ref 74–106)
HCT VFR BLD AUTO: 44.2 % (ref 41–53)
HGB BLD-MCNC: 14.3 G/DL (ref 13.5–17.5)
INR PPP: 0.97 (ref 0.9–1.15)
MCH RBC QN AUTO: 29.7 PG (ref 28–32)
MCV RBC AUTO: 92.1 FL (ref 80–100)
NRBC BLD QL AUTO: 0 %
POTASSIUM SERPL-SCNC: 3.4 MMOL/L (ref 3.5–5.1)
PROT SERPL-MCNC: 7.2 G/DL (ref 6.4–8.2)
SODIUM SERPL-SCNC: 142 MMOL/L (ref 136–145)

## 2020-08-18 PROCEDURE — 71045 X-RAY EXAM CHEST 1 VIEW: CPT

## 2020-08-18 PROCEDURE — 93306 TTE W/DOPPLER COMPLETE: CPT

## 2020-08-18 PROCEDURE — 36415 COLL VENOUS BLD VENIPUNCTURE: CPT

## 2020-08-18 PROCEDURE — 84484 ASSAY OF TROPONIN QUANT: CPT

## 2020-08-18 PROCEDURE — 80053 COMPREHEN METABOLIC PANEL: CPT

## 2020-08-18 PROCEDURE — 81001 URINALYSIS AUTO W/SCOPE: CPT

## 2020-08-18 PROCEDURE — 85610 PROTHROMBIN TIME: CPT

## 2020-08-18 PROCEDURE — 80048 BASIC METABOLIC PNL TOTAL CA: CPT

## 2020-08-18 PROCEDURE — 85025 COMPLETE CBC W/AUTO DIFF WBC: CPT

## 2020-08-18 PROCEDURE — 96374 THER/PROPH/DIAG INJ IV PUSH: CPT

## 2020-08-18 PROCEDURE — 85730 THROMBOPLASTIN TIME PARTIAL: CPT

## 2020-08-18 PROCEDURE — 83880 ASSAY OF NATRIURETIC PEPTIDE: CPT

## 2020-08-19 VITALS — DIASTOLIC BLOOD PRESSURE: 80 MMHG | SYSTOLIC BLOOD PRESSURE: 149 MMHG

## 2020-08-19 VITALS — SYSTOLIC BLOOD PRESSURE: 153 MMHG | DIASTOLIC BLOOD PRESSURE: 78 MMHG

## 2020-08-19 VITALS — DIASTOLIC BLOOD PRESSURE: 64 MMHG | SYSTOLIC BLOOD PRESSURE: 118 MMHG

## 2020-08-19 VITALS — SYSTOLIC BLOOD PRESSURE: 149 MMHG | DIASTOLIC BLOOD PRESSURE: 78 MMHG

## 2020-08-19 VITALS — DIASTOLIC BLOOD PRESSURE: 74 MMHG | SYSTOLIC BLOOD PRESSURE: 104 MMHG

## 2020-08-19 RX ADMIN — FUROSEMIDE SCH MG: 10 INJECTION, SOLUTION INTRAMUSCULAR; INTRAVENOUS at 17:26

## 2020-08-19 RX ADMIN — MORPHINE SULFATE PRN MG: 4 INJECTION, SOLUTION INTRAMUSCULAR; INTRAVENOUS at 14:29

## 2020-08-19 RX ADMIN — NITROGLYCERIN PRN MG: 0.4 TABLET SUBLINGUAL at 09:15

## 2020-08-19 RX ADMIN — MORPHINE SULFATE PRN MG: 4 INJECTION, SOLUTION INTRAMUSCULAR; INTRAVENOUS at 20:33

## 2020-08-19 RX ADMIN — ENOXAPARIN SODIUM SCH MG: 40 INJECTION SUBCUTANEOUS at 10:39

## 2020-08-19 RX ADMIN — NITROGLYCERIN PRN MG: 0.4 TABLET SUBLINGUAL at 09:10

## 2020-08-19 RX ADMIN — NITROGLYCERIN PRN MG: 0.4 TABLET SUBLINGUAL at 09:20

## 2020-08-19 RX ADMIN — TEMAZEPAM PRN MG: 15 CAPSULE ORAL at 21:44

## 2020-08-19 RX ADMIN — CLOPIDOGREL BISULFATE SCH MG: 75 TABLET ORAL at 10:38

## 2020-08-19 RX ADMIN — ATORVASTATIN CALCIUM SCH MG: 20 TABLET, FILM COATED ORAL at 21:43

## 2020-08-19 RX ADMIN — FAMOTIDINE SCH MG: 20 TABLET, FILM COATED ORAL at 21:43

## 2020-08-19 RX ADMIN — RANOLAZINE SCH MG: 500 TABLET, FILM COATED, EXTENDED RELEASE ORAL at 10:38

## 2020-08-19 RX ADMIN — FAMOTIDINE SCH MG: 20 TABLET, FILM COATED ORAL at 10:38

## 2020-08-19 RX ADMIN — RANOLAZINE SCH MG: 500 TABLET, FILM COATED, EXTENDED RELEASE ORAL at 21:43

## 2020-08-19 RX ADMIN — BENAZEPRIL HYDROCHLORIDE SCH MG: 10 TABLET, FILM COATED ORAL at 10:37

## 2020-08-20 VITALS — SYSTOLIC BLOOD PRESSURE: 124 MMHG | DIASTOLIC BLOOD PRESSURE: 67 MMHG

## 2020-08-20 VITALS — DIASTOLIC BLOOD PRESSURE: 70 MMHG | SYSTOLIC BLOOD PRESSURE: 134 MMHG

## 2020-08-20 VITALS — SYSTOLIC BLOOD PRESSURE: 122 MMHG | DIASTOLIC BLOOD PRESSURE: 81 MMHG

## 2020-08-20 VITALS — DIASTOLIC BLOOD PRESSURE: 89 MMHG | SYSTOLIC BLOOD PRESSURE: 149 MMHG

## 2020-08-20 VITALS — SYSTOLIC BLOOD PRESSURE: 128 MMHG | DIASTOLIC BLOOD PRESSURE: 72 MMHG

## 2020-08-20 LAB
ANION GAP SERPL CALCULATED.3IONS-SCNC: 2 MMOL/L (ref 5–15)
BUN SERPL-MCNC: 23 MG/DL (ref 7–18)
BUN/CREAT SERPL: 15
CALCIUM SERPL-MCNC: 8.3 MG/DL (ref 8.5–10.1)
CHLORIDE SERPL-SCNC: 110 MMOL/L (ref 98–107)
CO2 SERPL-SCNC: 29 MMOL/L (ref 21–32)
GLUCOSE SERPL-MCNC: 94 MG/DL (ref 74–106)
HCT VFR BLD AUTO: 41.9 % (ref 41–53)
HGB BLD-MCNC: 13.6 G/DL (ref 13.5–17.5)
MCH RBC QN AUTO: 29.9 PG (ref 28–32)
MCV RBC AUTO: 92 FL (ref 80–100)
NRBC BLD QL AUTO: 0.1 %
POTASSIUM SERPL-SCNC: 3.5 MMOL/L (ref 3.5–5.1)
SODIUM SERPL-SCNC: 141 MMOL/L (ref 136–145)

## 2020-08-20 RX ADMIN — MORPHINE SULFATE PRN MG: 4 INJECTION, SOLUTION INTRAMUSCULAR; INTRAVENOUS at 02:24

## 2020-08-20 RX ADMIN — TEMAZEPAM PRN MG: 15 CAPSULE ORAL at 22:28

## 2020-08-20 RX ADMIN — DILTIAZEM HYDROCHLORIDE SCH MG: 120 CAPSULE, EXTENDED RELEASE ORAL at 11:21

## 2020-08-20 RX ADMIN — BENAZEPRIL HYDROCHLORIDE SCH MG: 10 TABLET, FILM COATED ORAL at 11:22

## 2020-08-20 RX ADMIN — MORPHINE SULFATE PRN MG: 4 INJECTION, SOLUTION INTRAMUSCULAR; INTRAVENOUS at 08:16

## 2020-08-20 RX ADMIN — FAMOTIDINE SCH MG: 20 TABLET, FILM COATED ORAL at 11:22

## 2020-08-20 RX ADMIN — MORPHINE SULFATE PRN MG: 4 INJECTION, SOLUTION INTRAMUSCULAR; INTRAVENOUS at 20:41

## 2020-08-20 RX ADMIN — FUROSEMIDE SCH MG: 10 INJECTION, SOLUTION INTRAMUSCULAR; INTRAVENOUS at 18:22

## 2020-08-20 RX ADMIN — FUROSEMIDE SCH MG: 10 INJECTION, SOLUTION INTRAMUSCULAR; INTRAVENOUS at 06:22

## 2020-08-20 RX ADMIN — MORPHINE SULFATE PRN MG: 4 INJECTION, SOLUTION INTRAMUSCULAR; INTRAVENOUS at 14:29

## 2020-08-20 RX ADMIN — CLOPIDOGREL BISULFATE SCH MG: 75 TABLET ORAL at 11:23

## 2020-08-20 RX ADMIN — FAMOTIDINE SCH MG: 20 TABLET, FILM COATED ORAL at 22:27

## 2020-08-20 RX ADMIN — RANOLAZINE SCH MG: 500 TABLET, FILM COATED, EXTENDED RELEASE ORAL at 22:27

## 2020-08-20 RX ADMIN — ATORVASTATIN CALCIUM SCH MG: 20 TABLET, FILM COATED ORAL at 22:27

## 2020-08-20 RX ADMIN — ENOXAPARIN SODIUM SCH MG: 40 INJECTION SUBCUTANEOUS at 11:23

## 2020-08-20 RX ADMIN — RANOLAZINE SCH MG: 500 TABLET, FILM COATED, EXTENDED RELEASE ORAL at 11:19

## 2020-08-21 VITALS — SYSTOLIC BLOOD PRESSURE: 160 MMHG | DIASTOLIC BLOOD PRESSURE: 74 MMHG

## 2020-08-21 VITALS — SYSTOLIC BLOOD PRESSURE: 127 MMHG | DIASTOLIC BLOOD PRESSURE: 72 MMHG

## 2020-08-21 VITALS — SYSTOLIC BLOOD PRESSURE: 122 MMHG | DIASTOLIC BLOOD PRESSURE: 85 MMHG

## 2020-08-21 VITALS — DIASTOLIC BLOOD PRESSURE: 76 MMHG | SYSTOLIC BLOOD PRESSURE: 142 MMHG

## 2020-08-21 RX ADMIN — FAMOTIDINE SCH MG: 20 TABLET, FILM COATED ORAL at 10:37

## 2020-08-21 RX ADMIN — CLOPIDOGREL BISULFATE SCH MG: 75 TABLET ORAL at 10:38

## 2020-08-21 RX ADMIN — ENOXAPARIN SODIUM SCH MG: 40 INJECTION SUBCUTANEOUS at 10:40

## 2020-08-21 RX ADMIN — DILTIAZEM HYDROCHLORIDE SCH MG: 120 CAPSULE, EXTENDED RELEASE ORAL at 10:40

## 2020-08-21 RX ADMIN — RANOLAZINE SCH MG: 500 TABLET, FILM COATED, EXTENDED RELEASE ORAL at 10:31

## 2020-08-21 RX ADMIN — BENAZEPRIL HYDROCHLORIDE SCH MG: 10 TABLET, FILM COATED ORAL at 10:38

## 2020-08-21 RX ADMIN — MORPHINE SULFATE PRN MG: 4 INJECTION, SOLUTION INTRAMUSCULAR; INTRAVENOUS at 02:35

## 2020-08-21 RX ADMIN — MORPHINE SULFATE PRN MG: 4 INJECTION, SOLUTION INTRAMUSCULAR; INTRAVENOUS at 08:31

## 2020-08-21 RX ADMIN — FUROSEMIDE SCH MG: 10 INJECTION, SOLUTION INTRAMUSCULAR; INTRAVENOUS at 05:49

## 2020-09-15 ENCOUNTER — HOSPITAL ENCOUNTER (EMERGENCY)
Dept: HOSPITAL 15 - ER | Age: 65
Discharge: HOME | End: 2020-09-15
Payer: COMMERCIAL

## 2020-09-15 VITALS — SYSTOLIC BLOOD PRESSURE: 159 MMHG | DIASTOLIC BLOOD PRESSURE: 94 MMHG

## 2020-09-15 VITALS — BODY MASS INDEX: 47.74 KG/M2 | WEIGHT: 315 LBS | HEIGHT: 68 IN

## 2020-09-15 DIAGNOSIS — I50.43: ICD-10-CM

## 2020-09-15 DIAGNOSIS — E46: ICD-10-CM

## 2020-09-15 DIAGNOSIS — I13.0: Primary | ICD-10-CM

## 2020-09-15 DIAGNOSIS — N18.9: ICD-10-CM

## 2020-09-15 DIAGNOSIS — F41.9: ICD-10-CM

## 2020-09-15 DIAGNOSIS — F17.210: ICD-10-CM

## 2020-09-15 DIAGNOSIS — E11.9: ICD-10-CM

## 2020-09-15 DIAGNOSIS — E78.5: ICD-10-CM

## 2020-09-15 DIAGNOSIS — K21.9: ICD-10-CM

## 2020-09-15 DIAGNOSIS — J18.9: ICD-10-CM

## 2020-09-15 LAB
ALBUMIN SERPL-MCNC: 3 G/DL (ref 3.4–5)
ALP SERPL-CCNC: 83 U/L (ref 45–117)
ALT SERPL-CCNC: 21 U/L (ref 16–61)
ANION GAP SERPL CALCULATED.3IONS-SCNC: 5 MMOL/L (ref 5–15)
BILIRUB SERPL-MCNC: 1 MG/DL (ref 0.2–1)
BUN SERPL-MCNC: 14 MG/DL (ref 7–18)
BUN/CREAT SERPL: 14.6
CALCIUM SERPL-MCNC: 8.6 MG/DL (ref 8.5–10.1)
CHLORIDE SERPL-SCNC: 112 MMOL/L (ref 98–107)
CO2 SERPL-SCNC: 24 MMOL/L (ref 21–32)
GLUCOSE SERPL-MCNC: 114 MG/DL (ref 74–106)
HCT VFR BLD AUTO: 43.8 % (ref 41–53)
HGB BLD-MCNC: 13.8 G/DL (ref 13.5–17.5)
MCH RBC QN AUTO: 29.5 PG (ref 28–32)
MCV RBC AUTO: 93.2 FL (ref 80–100)
NRBC BLD QL AUTO: 0 %
POTASSIUM SERPL-SCNC: 3.6 MMOL/L (ref 3.5–5.1)
PROT SERPL-MCNC: 7.2 G/DL (ref 6.4–8.2)
SODIUM SERPL-SCNC: 141 MMOL/L (ref 136–145)

## 2020-09-15 PROCEDURE — 71045 X-RAY EXAM CHEST 1 VIEW: CPT

## 2020-09-15 PROCEDURE — 36415 COLL VENOUS BLD VENIPUNCTURE: CPT

## 2020-09-15 PROCEDURE — 84484 ASSAY OF TROPONIN QUANT: CPT

## 2020-09-15 PROCEDURE — 80053 COMPREHEN METABOLIC PANEL: CPT

## 2020-09-15 PROCEDURE — 93005 ELECTROCARDIOGRAM TRACING: CPT

## 2020-09-15 PROCEDURE — 85025 COMPLETE CBC W/AUTO DIFF WBC: CPT

## 2020-09-15 RX ADMIN — FUROSEMIDE ONE MG: 20 TABLET ORAL at 11:04

## 2020-09-15 RX ADMIN — FUROSEMIDE ONE MG: 20 TABLET ORAL at 10:45

## 2021-07-08 ENCOUNTER — HOSPITAL ENCOUNTER (INPATIENT)
Dept: HOSPITAL 15 - ER | Age: 66
LOS: 2 days | Discharge: HOME | DRG: 291 | End: 2021-07-10
Attending: FAMILY MEDICINE | Admitting: INTERNAL MEDICINE
Payer: COMMERCIAL

## 2021-07-08 VITALS — HEIGHT: 68 IN | BODY MASS INDEX: 47.74 KG/M2 | WEIGHT: 315 LBS

## 2021-07-08 VITALS — DIASTOLIC BLOOD PRESSURE: 70 MMHG | SYSTOLIC BLOOD PRESSURE: 123 MMHG

## 2021-07-08 VITALS — DIASTOLIC BLOOD PRESSURE: 73 MMHG | SYSTOLIC BLOOD PRESSURE: 116 MMHG

## 2021-07-08 VITALS — DIASTOLIC BLOOD PRESSURE: 68 MMHG | SYSTOLIC BLOOD PRESSURE: 123 MMHG

## 2021-07-08 DIAGNOSIS — E66.01: ICD-10-CM

## 2021-07-08 DIAGNOSIS — F41.9: ICD-10-CM

## 2021-07-08 DIAGNOSIS — Z91.14: ICD-10-CM

## 2021-07-08 DIAGNOSIS — J90: ICD-10-CM

## 2021-07-08 DIAGNOSIS — Z79.899: ICD-10-CM

## 2021-07-08 DIAGNOSIS — N18.30: ICD-10-CM

## 2021-07-08 DIAGNOSIS — I24.9: ICD-10-CM

## 2021-07-08 DIAGNOSIS — F17.210: ICD-10-CM

## 2021-07-08 DIAGNOSIS — E78.00: ICD-10-CM

## 2021-07-08 DIAGNOSIS — Z82.3: ICD-10-CM

## 2021-07-08 DIAGNOSIS — K21.9: ICD-10-CM

## 2021-07-08 DIAGNOSIS — Z86.14: ICD-10-CM

## 2021-07-08 DIAGNOSIS — E78.5: ICD-10-CM

## 2021-07-08 DIAGNOSIS — Z90.49: ICD-10-CM

## 2021-07-08 DIAGNOSIS — Z81.8: ICD-10-CM

## 2021-07-08 DIAGNOSIS — Z82.5: ICD-10-CM

## 2021-07-08 DIAGNOSIS — Z79.84: ICD-10-CM

## 2021-07-08 DIAGNOSIS — F11.20: ICD-10-CM

## 2021-07-08 DIAGNOSIS — I25.2: ICD-10-CM

## 2021-07-08 DIAGNOSIS — Z95.5: ICD-10-CM

## 2021-07-08 DIAGNOSIS — I25.10: ICD-10-CM

## 2021-07-08 DIAGNOSIS — Z86.73: ICD-10-CM

## 2021-07-08 DIAGNOSIS — I50.33: ICD-10-CM

## 2021-07-08 DIAGNOSIS — F32.9: ICD-10-CM

## 2021-07-08 DIAGNOSIS — I13.0: Primary | ICD-10-CM

## 2021-07-08 DIAGNOSIS — I16.0: ICD-10-CM

## 2021-07-08 DIAGNOSIS — G89.4: ICD-10-CM

## 2021-07-08 DIAGNOSIS — J44.9: ICD-10-CM

## 2021-07-08 DIAGNOSIS — Z82.49: ICD-10-CM

## 2021-07-08 DIAGNOSIS — Z83.3: ICD-10-CM

## 2021-07-08 DIAGNOSIS — E44.0: ICD-10-CM

## 2021-07-08 DIAGNOSIS — E11.22: ICD-10-CM

## 2021-07-08 DIAGNOSIS — Z71.6: ICD-10-CM

## 2021-07-08 LAB
ALBUMIN SERPL-MCNC: 2.7 G/DL (ref 3.4–5)
ALP SERPL-CCNC: 101 U/L (ref 45–117)
ALT SERPL-CCNC: 22 U/L (ref 16–61)
ANION GAP SERPL CALCULATED.3IONS-SCNC: 8 MMOL/L (ref 5–15)
BILIRUB SERPL-MCNC: 0.6 MG/DL (ref 0.2–1)
BUN SERPL-MCNC: 13 MG/DL (ref 7–18)
BUN/CREAT SERPL: 12
CALCIUM SERPL-MCNC: 8.3 MG/DL (ref 8.5–10.1)
CHLORIDE SERPL-SCNC: 107 MMOL/L (ref 98–107)
CO2 SERPL-SCNC: 29 MMOL/L (ref 21–32)
GLUCOSE SERPL-MCNC: 111 MG/DL (ref 74–106)
HCT VFR BLD AUTO: 41.4 % (ref 41–53)
HGB BLD-MCNC: 14 G/DL (ref 13.5–17.5)
MCH RBC QN AUTO: 30.9 PG (ref 28–32)
MCV RBC AUTO: 91.5 FL (ref 80–100)
NRBC BLD QL AUTO: 0.1 %
POTASSIUM SERPL-SCNC: 3.5 MMOL/L (ref 3.5–5.1)
PROT SERPL-MCNC: 7 G/DL (ref 6.4–8.2)
SODIUM SERPL-SCNC: 144 MMOL/L (ref 136–145)

## 2021-07-08 PROCEDURE — 83036 HEMOGLOBIN GLYCOSYLATED A1C: CPT

## 2021-07-08 PROCEDURE — 93005 ELECTROCARDIOGRAM TRACING: CPT

## 2021-07-08 PROCEDURE — 36415 COLL VENOUS BLD VENIPUNCTURE: CPT

## 2021-07-08 PROCEDURE — 85025 COMPLETE CBC W/AUTO DIFF WBC: CPT

## 2021-07-08 PROCEDURE — 96374 THER/PROPH/DIAG INJ IV PUSH: CPT

## 2021-07-08 PROCEDURE — 80061 LIPID PANEL: CPT

## 2021-07-08 PROCEDURE — 93970 EXTREMITY STUDY: CPT

## 2021-07-08 PROCEDURE — 80053 COMPREHEN METABOLIC PANEL: CPT

## 2021-07-08 PROCEDURE — 93306 TTE W/DOPPLER COMPLETE: CPT

## 2021-07-08 PROCEDURE — 96376 TX/PRO/DX INJ SAME DRUG ADON: CPT

## 2021-07-08 PROCEDURE — 81001 URINALYSIS AUTO W/SCOPE: CPT

## 2021-07-08 PROCEDURE — 84443 ASSAY THYROID STIM HORMONE: CPT

## 2021-07-08 PROCEDURE — 71045 X-RAY EXAM CHEST 1 VIEW: CPT

## 2021-07-08 PROCEDURE — 96375 TX/PRO/DX INJ NEW DRUG ADDON: CPT

## 2021-07-08 PROCEDURE — 85049 AUTOMATED PLATELET COUNT: CPT

## 2021-07-08 PROCEDURE — 84484 ASSAY OF TROPONIN QUANT: CPT

## 2021-07-08 PROCEDURE — 87426 SARSCOV CORONAVIRUS AG IA: CPT

## 2021-07-08 PROCEDURE — 96372 THER/PROPH/DIAG INJ SC/IM: CPT

## 2021-07-08 PROCEDURE — 82962 GLUCOSE BLOOD TEST: CPT

## 2021-07-08 PROCEDURE — 83880 ASSAY OF NATRIURETIC PEPTIDE: CPT

## 2021-07-08 RX ADMIN — SODIUM CHLORIDE SCH ML: 9 INJECTION INTRAMUSCULAR; INTRAVENOUS; SUBCUTANEOUS at 14:20

## 2021-07-08 RX ADMIN — POTASSIUM CHLORIDE SCH MEQ: 1500 TABLET, EXTENDED RELEASE ORAL at 11:09

## 2021-07-08 RX ADMIN — ATORVASTATIN CALCIUM SCH MG: 20 TABLET, FILM COATED ORAL at 22:38

## 2021-07-08 RX ADMIN — HUMAN INSULIN SCH UNITS: 100 INJECTION, SOLUTION SUBCUTANEOUS at 22:00

## 2021-07-08 RX ADMIN — RANOLAZINE SCH MG: 500 TABLET, FILM COATED, EXTENDED RELEASE ORAL at 22:37

## 2021-07-08 RX ADMIN — FUROSEMIDE SCH MG: 10 INJECTION, SOLUTION INTRAMUSCULAR; INTRAVENOUS at 18:23

## 2021-07-08 RX ADMIN — FUROSEMIDE SCH MG: 10 INJECTION, SOLUTION INTRAMUSCULAR; INTRAVENOUS at 10:00

## 2021-07-08 RX ADMIN — ISOSORBIDE MONONITRATE SCH MG: 60 TABLET, EXTENDED RELEASE ORAL at 10:00

## 2021-07-08 RX ADMIN — HUMAN INSULIN SCH UNITS: 100 INJECTION, SOLUTION SUBCUTANEOUS at 11:30

## 2021-07-08 RX ADMIN — Medication SCH STRIP: at 12:51

## 2021-07-08 RX ADMIN — HUMAN INSULIN SCH UNITS: 100 INJECTION, SOLUTION SUBCUTANEOUS at 17:00

## 2021-07-08 RX ADMIN — SODIUM CHLORIDE SCH ML: 9 INJECTION INTRAMUSCULAR; INTRAVENOUS; SUBCUTANEOUS at 22:00

## 2021-07-08 RX ADMIN — Medication SCH STRIP: at 22:00

## 2021-07-08 RX ADMIN — ENOXAPARIN SODIUM SCH MG: 40 INJECTION SUBCUTANEOUS at 11:10

## 2021-07-08 RX ADMIN — CARVEDILOL SCH MG: 3.12 TABLET, FILM COATED ORAL at 10:00

## 2021-07-08 RX ADMIN — Medication SCH STRIP: at 18:02

## 2021-07-08 RX ADMIN — PANTOPRAZOLE SODIUM SCH MG: 40 TABLET, DELAYED RELEASE ORAL at 11:09

## 2021-07-08 RX ADMIN — CARVEDILOL SCH MG: 3.12 TABLET, FILM COATED ORAL at 22:37

## 2021-07-08 RX ADMIN — BENAZEPRIL HYDROCHLORIDE SCH MG: 10 TABLET, FILM COATED ORAL at 10:00

## 2021-07-09 VITALS — DIASTOLIC BLOOD PRESSURE: 71 MMHG | SYSTOLIC BLOOD PRESSURE: 127 MMHG

## 2021-07-09 VITALS — DIASTOLIC BLOOD PRESSURE: 75 MMHG | SYSTOLIC BLOOD PRESSURE: 127 MMHG

## 2021-07-09 VITALS — DIASTOLIC BLOOD PRESSURE: 78 MMHG | SYSTOLIC BLOOD PRESSURE: 132 MMHG

## 2021-07-09 VITALS — DIASTOLIC BLOOD PRESSURE: 66 MMHG | SYSTOLIC BLOOD PRESSURE: 129 MMHG

## 2021-07-09 VITALS — SYSTOLIC BLOOD PRESSURE: 132 MMHG | DIASTOLIC BLOOD PRESSURE: 70 MMHG

## 2021-07-09 LAB
ALBUMIN SERPL-MCNC: 2.6 G/DL (ref 3.4–5)
ALP SERPL-CCNC: 88 U/L (ref 45–117)
ALT SERPL-CCNC: 20 U/L (ref 16–61)
ANION GAP SERPL CALCULATED.3IONS-SCNC: 7 MMOL/L (ref 5–15)
BILIRUB SERPL-MCNC: 0.5 MG/DL (ref 0.2–1)
BUN SERPL-MCNC: 18 MG/DL (ref 7–18)
BUN/CREAT SERPL: 10.2
CALCIUM SERPL-MCNC: 8.3 MG/DL (ref 8.5–10.1)
CHLORIDE SERPL-SCNC: 105 MMOL/L (ref 98–107)
CHOLEST SERPL-MCNC: 175 MG/DL (ref ?–200)
CO2 SERPL-SCNC: 29 MMOL/L (ref 21–32)
GLUCOSE SERPL-MCNC: 115 MG/DL (ref 74–106)
HCT VFR BLD AUTO: 39.3 % (ref 41–53)
HDLC SERPL-MCNC: 43 MG/DL (ref 40–59)
HGB BLD-MCNC: 13.3 G/DL (ref 13.5–17.5)
MCH RBC QN AUTO: 31.3 PG (ref 28–32)
MCV RBC AUTO: 92.3 FL (ref 80–100)
NRBC BLD QL AUTO: 0.1 %
POTASSIUM SERPL-SCNC: 3.6 MMOL/L (ref 3.5–5.1)
PROT SERPL-MCNC: 6.8 G/DL (ref 6.4–8.2)
SODIUM SERPL-SCNC: 141 MMOL/L (ref 136–145)
TRIGL SERPL-MCNC: 115 MG/DL (ref ?–150)

## 2021-07-09 RX ADMIN — POTASSIUM CHLORIDE SCH MEQ: 1500 TABLET, EXTENDED RELEASE ORAL at 10:14

## 2021-07-09 RX ADMIN — HUMAN INSULIN SCH UNITS: 100 INJECTION, SOLUTION SUBCUTANEOUS at 17:00

## 2021-07-09 RX ADMIN — FUROSEMIDE SCH MG: 10 INJECTION, SOLUTION INTRAMUSCULAR; INTRAVENOUS at 18:11

## 2021-07-09 RX ADMIN — Medication SCH STRIP: at 06:02

## 2021-07-09 RX ADMIN — CARVEDILOL SCH MG: 3.12 TABLET, FILM COATED ORAL at 22:01

## 2021-07-09 RX ADMIN — Medication SCH STRIP: at 11:59

## 2021-07-09 RX ADMIN — FUROSEMIDE SCH MG: 10 INJECTION, SOLUTION INTRAMUSCULAR; INTRAVENOUS at 06:06

## 2021-07-09 RX ADMIN — RANOLAZINE SCH MG: 500 TABLET, FILM COATED, EXTENDED RELEASE ORAL at 13:55

## 2021-07-09 RX ADMIN — HUMAN INSULIN SCH UNITS: 100 INJECTION, SOLUTION SUBCUTANEOUS at 22:00

## 2021-07-09 RX ADMIN — SODIUM CHLORIDE SCH ML: 9 INJECTION INTRAMUSCULAR; INTRAVENOUS; SUBCUTANEOUS at 17:29

## 2021-07-09 RX ADMIN — CARVEDILOL SCH MG: 3.12 TABLET, FILM COATED ORAL at 10:00

## 2021-07-09 RX ADMIN — SODIUM CHLORIDE SCH ML: 9 INJECTION INTRAMUSCULAR; INTRAVENOUS; SUBCUTANEOUS at 06:05

## 2021-07-09 RX ADMIN — ENOXAPARIN SODIUM SCH MG: 40 INJECTION SUBCUTANEOUS at 22:00

## 2021-07-09 RX ADMIN — Medication SCH STRIP: at 17:28

## 2021-07-09 RX ADMIN — MORPHINE SULFATE PRN MG: 2 INJECTION, SOLUTION INTRAMUSCULAR; INTRAVENOUS at 18:13

## 2021-07-09 RX ADMIN — PANTOPRAZOLE SODIUM SCH MG: 40 TABLET, DELAYED RELEASE ORAL at 10:13

## 2021-07-09 RX ADMIN — ATORVASTATIN CALCIUM SCH MG: 20 TABLET, FILM COATED ORAL at 22:00

## 2021-07-09 RX ADMIN — HUMAN INSULIN SCH UNITS: 100 INJECTION, SOLUTION SUBCUTANEOUS at 06:20

## 2021-07-09 RX ADMIN — MORPHINE SULFATE PRN MG: 2 INJECTION, SOLUTION INTRAMUSCULAR; INTRAVENOUS at 13:56

## 2021-07-09 RX ADMIN — ENOXAPARIN SODIUM SCH MG: 40 INJECTION SUBCUTANEOUS at 10:14

## 2021-07-09 RX ADMIN — MORPHINE SULFATE PRN MG: 2 INJECTION, SOLUTION INTRAMUSCULAR; INTRAVENOUS at 22:16

## 2021-07-09 RX ADMIN — BENAZEPRIL HYDROCHLORIDE SCH MG: 10 TABLET, FILM COATED ORAL at 14:02

## 2021-07-09 RX ADMIN — HUMAN INSULIN SCH UNITS: 100 INJECTION, SOLUTION SUBCUTANEOUS at 11:30

## 2021-07-09 RX ADMIN — RANOLAZINE SCH MG: 500 TABLET, FILM COATED, EXTENDED RELEASE ORAL at 22:01

## 2021-07-09 RX ADMIN — Medication SCH STRIP: at 22:20

## 2021-07-09 RX ADMIN — ISOSORBIDE MONONITRATE SCH MG: 60 TABLET, EXTENDED RELEASE ORAL at 13:54

## 2021-07-09 RX ADMIN — SODIUM CHLORIDE SCH ML: 9 INJECTION INTRAMUSCULAR; INTRAVENOUS; SUBCUTANEOUS at 22:01

## 2021-07-10 VITALS — DIASTOLIC BLOOD PRESSURE: 71 MMHG | SYSTOLIC BLOOD PRESSURE: 142 MMHG

## 2021-07-10 VITALS — DIASTOLIC BLOOD PRESSURE: 71 MMHG | SYSTOLIC BLOOD PRESSURE: 121 MMHG

## 2021-07-10 VITALS — SYSTOLIC BLOOD PRESSURE: 139 MMHG | DIASTOLIC BLOOD PRESSURE: 79 MMHG

## 2021-07-10 VITALS — DIASTOLIC BLOOD PRESSURE: 79 MMHG | SYSTOLIC BLOOD PRESSURE: 139 MMHG

## 2021-07-10 RX ADMIN — FUROSEMIDE SCH MG: 10 INJECTION, SOLUTION INTRAMUSCULAR; INTRAVENOUS at 06:21

## 2021-07-10 RX ADMIN — ISOSORBIDE MONONITRATE SCH MG: 60 TABLET, EXTENDED RELEASE ORAL at 08:46

## 2021-07-10 RX ADMIN — CARVEDILOL SCH MG: 3.12 TABLET, FILM COATED ORAL at 08:44

## 2021-07-10 RX ADMIN — HUMAN INSULIN SCH UNITS: 100 INJECTION, SOLUTION SUBCUTANEOUS at 11:30

## 2021-07-10 RX ADMIN — ENOXAPARIN SODIUM SCH MG: 40 INJECTION SUBCUTANEOUS at 08:43

## 2021-07-10 RX ADMIN — PANTOPRAZOLE SODIUM SCH MG: 40 TABLET, DELAYED RELEASE ORAL at 08:44

## 2021-07-10 RX ADMIN — SODIUM CHLORIDE SCH ML: 9 INJECTION INTRAMUSCULAR; INTRAVENOUS; SUBCUTANEOUS at 11:46

## 2021-07-10 RX ADMIN — MORPHINE SULFATE PRN MG: 2 INJECTION, SOLUTION INTRAMUSCULAR; INTRAVENOUS at 03:32

## 2021-07-10 RX ADMIN — POTASSIUM CHLORIDE SCH MEQ: 1500 TABLET, EXTENDED RELEASE ORAL at 08:46

## 2021-07-10 RX ADMIN — SODIUM CHLORIDE SCH ML: 9 INJECTION INTRAMUSCULAR; INTRAVENOUS; SUBCUTANEOUS at 06:21

## 2021-07-10 RX ADMIN — HUMAN INSULIN SCH UNITS: 100 INJECTION, SOLUTION SUBCUTANEOUS at 06:32

## 2021-07-10 RX ADMIN — Medication SCH STRIP: at 06:21

## 2021-07-10 RX ADMIN — MORPHINE SULFATE PRN MG: 2 INJECTION, SOLUTION INTRAMUSCULAR; INTRAVENOUS at 08:43

## 2021-07-10 RX ADMIN — RANOLAZINE SCH MG: 500 TABLET, FILM COATED, EXTENDED RELEASE ORAL at 08:43

## 2021-07-10 RX ADMIN — Medication SCH STRIP: at 11:46

## 2021-07-10 RX ADMIN — BENAZEPRIL HYDROCHLORIDE SCH MG: 10 TABLET, FILM COATED ORAL at 08:45

## 2021-08-16 ENCOUNTER — HOSPITAL ENCOUNTER (EMERGENCY)
Dept: HOSPITAL 15 - ER | Age: 66
LOS: 1 days | Discharge: HOME | End: 2021-08-17
Payer: COMMERCIAL

## 2021-08-16 VITALS — BODY MASS INDEX: 47.74 KG/M2 | HEIGHT: 68 IN | WEIGHT: 315 LBS

## 2021-08-16 DIAGNOSIS — F17.210: ICD-10-CM

## 2021-08-16 DIAGNOSIS — E11.22: ICD-10-CM

## 2021-08-16 DIAGNOSIS — Z79.82: ICD-10-CM

## 2021-08-16 DIAGNOSIS — Z79.899: ICD-10-CM

## 2021-08-16 DIAGNOSIS — E78.5: ICD-10-CM

## 2021-08-16 DIAGNOSIS — I50.9: ICD-10-CM

## 2021-08-16 DIAGNOSIS — Z90.49: ICD-10-CM

## 2021-08-16 DIAGNOSIS — J44.9: ICD-10-CM

## 2021-08-16 DIAGNOSIS — E11.9: ICD-10-CM

## 2021-08-16 DIAGNOSIS — I25.2: ICD-10-CM

## 2021-08-16 DIAGNOSIS — I13.0: ICD-10-CM

## 2021-08-16 DIAGNOSIS — I20.9: Primary | ICD-10-CM

## 2021-08-16 DIAGNOSIS — K21.9: ICD-10-CM

## 2021-08-16 DIAGNOSIS — Z86.73: ICD-10-CM

## 2021-08-16 DIAGNOSIS — N18.9: ICD-10-CM

## 2021-08-16 DIAGNOSIS — Z79.01: ICD-10-CM

## 2021-08-16 LAB
ALBUMIN SERPL-MCNC: 2.7 G/DL (ref 3.4–5)
ALP SERPL-CCNC: 87 U/L (ref 45–117)
ALT SERPL-CCNC: 29 U/L (ref 16–61)
ANION GAP SERPL CALCULATED.3IONS-SCNC: 10 MMOL/L (ref 5–15)
BILIRUB SERPL-MCNC: 0.3 MG/DL (ref 0.2–1)
BUN SERPL-MCNC: 13 MG/DL (ref 7–18)
BUN/CREAT SERPL: 9.8
CALCIUM SERPL-MCNC: 8.2 MG/DL (ref 8.5–10.1)
CHLORIDE SERPL-SCNC: 111 MMOL/L (ref 98–107)
CO2 SERPL-SCNC: 23 MMOL/L (ref 21–32)
GLUCOSE SERPL-MCNC: 106 MG/DL (ref 74–106)
HCT VFR BLD AUTO: 43.7 % (ref 41–53)
HGB BLD-MCNC: 14.4 G/DL (ref 13.5–17.5)
MAGNESIUM SERPL-MCNC: 2.1 MG/DL (ref 1.6–2.6)
MCH RBC QN AUTO: 30.9 PG (ref 28–32)
MCV RBC AUTO: 94.1 FL (ref 80–100)
NRBC BLD QL AUTO: 0.2 %
POTASSIUM SERPL-SCNC: 3.6 MMOL/L (ref 3.5–5.1)
PROT SERPL-MCNC: 7.3 G/DL (ref 6.4–8.2)
SODIUM SERPL-SCNC: 144 MMOL/L (ref 136–145)

## 2021-08-16 PROCEDURE — 80053 COMPREHEN METABOLIC PANEL: CPT

## 2021-08-16 PROCEDURE — 36415 COLL VENOUS BLD VENIPUNCTURE: CPT

## 2021-08-16 PROCEDURE — 71045 X-RAY EXAM CHEST 1 VIEW: CPT

## 2021-08-16 PROCEDURE — 83880 ASSAY OF NATRIURETIC PEPTIDE: CPT

## 2021-08-16 PROCEDURE — 84484 ASSAY OF TROPONIN QUANT: CPT

## 2021-08-16 PROCEDURE — 85025 COMPLETE CBC W/AUTO DIFF WBC: CPT

## 2021-08-16 PROCEDURE — 93005 ELECTROCARDIOGRAM TRACING: CPT

## 2021-08-16 PROCEDURE — 83735 ASSAY OF MAGNESIUM: CPT

## 2021-08-17 VITALS — DIASTOLIC BLOOD PRESSURE: 94 MMHG | SYSTOLIC BLOOD PRESSURE: 172 MMHG

## 2022-08-28 ENCOUNTER — HOSPITAL ENCOUNTER (EMERGENCY)
Dept: HOSPITAL 15 - ER | Age: 67
LOS: 1 days | Discharge: HOME | End: 2022-08-29
Payer: COMMERCIAL

## 2022-08-28 VITALS — HEIGHT: 66 IN | WEIGHT: 315 LBS | BODY MASS INDEX: 50.62 KG/M2

## 2022-08-28 DIAGNOSIS — Z86.73: ICD-10-CM

## 2022-08-28 DIAGNOSIS — Z90.49: ICD-10-CM

## 2022-08-28 DIAGNOSIS — I50.9: ICD-10-CM

## 2022-08-28 DIAGNOSIS — F17.210: ICD-10-CM

## 2022-08-28 DIAGNOSIS — E78.5: ICD-10-CM
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
## 2022-08-28 DIAGNOSIS — K21.9: ICD-10-CM

## 2022-08-28 DIAGNOSIS — N18.9: ICD-10-CM
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
## 2022-08-28 DIAGNOSIS — E11.22: Primary | ICD-10-CM

## 2022-08-28 DIAGNOSIS — R53.83: ICD-10-CM
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
## 2022-08-28 DIAGNOSIS — J44.9: ICD-10-CM

## 2022-08-28 DIAGNOSIS — I13.0: ICD-10-CM

## 2022-08-28 LAB
ALBUMIN SERPL-MCNC: 2.9 G/DL (ref 3.4–5)
ALP SERPL-CCNC: 58 U/L (ref 45–117)
ALT SERPL-CCNC: 21 U/L (ref 16–61)
ANION GAP SERPL CALCULATED.3IONS-SCNC: 4 MMOL/L (ref 5–15)
BILIRUB SERPL-MCNC: 0.4 MG/DL (ref 0.2–1)
BUN SERPL-MCNC: 26 MG/DL (ref 7–18)
BUN/CREAT SERPL: 17
CALCIUM SERPL-MCNC: 9.5 MG/DL (ref 8.5–10.1)
CHLORIDE SERPL-SCNC: 105 MMOL/L (ref 98–107)
CO2 SERPL-SCNC: 30 MMOL/L (ref 21–32)
GLUCOSE SERPL-MCNC: 96 MG/DL (ref 74–106)
HCT VFR BLD AUTO: 43.7 % (ref 41–53)
HGB BLD-MCNC: 13.7 G/DL (ref 13.5–17.5)
MCH RBC QN AUTO: 28.7 PG (ref 28–32)
MCV RBC AUTO: 91.4 FL (ref 80–100)
NRBC BLD QL AUTO: 0.2 %
POTASSIUM SERPL-SCNC: 4.9 MMOL/L (ref 3.5–5.1)
PROT SERPL-MCNC: 7.6 G/DL (ref 6.4–8.2)
SODIUM SERPL-SCNC: 139 MMOL/L (ref 136–145)

## 2022-08-28 PROCEDURE — 84484 ASSAY OF TROPONIN QUANT: CPT

## 2022-08-28 PROCEDURE — 83880 ASSAY OF NATRIURETIC PEPTIDE: CPT

## 2022-08-28 PROCEDURE — 36415 COLL VENOUS BLD VENIPUNCTURE: CPT

## 2022-08-28 PROCEDURE — 80053 COMPREHEN METABOLIC PANEL: CPT

## 2022-08-28 PROCEDURE — 70450 CT HEAD/BRAIN W/O DYE: CPT

## 2022-08-28 PROCEDURE — 85025 COMPLETE CBC W/AUTO DIFF WBC: CPT

## 2022-08-29 VITALS — DIASTOLIC BLOOD PRESSURE: 100 MMHG | SYSTOLIC BLOOD PRESSURE: 191 MMHG

## 2023-08-16 ENCOUNTER — HOSPITAL ENCOUNTER (INPATIENT)
Dept: HOSPITAL 15 - ER | Age: 68
LOS: 3 days | Discharge: HOME | DRG: 291 | End: 2023-08-19
Attending: INTERNAL MEDICINE | Admitting: NURSE PRACTITIONER
Payer: COMMERCIAL

## 2023-08-16 VITALS
OXYGEN SATURATION: 95 % | SYSTOLIC BLOOD PRESSURE: 169 MMHG | DIASTOLIC BLOOD PRESSURE: 71 MMHG | TEMPERATURE: 98.2 F | RESPIRATION RATE: 30 BRPM | HEART RATE: 72 BPM

## 2023-08-16 VITALS — BODY MASS INDEX: 49.44 KG/M2 | HEIGHT: 67 IN | WEIGHT: 315 LBS

## 2023-08-16 VITALS — SYSTOLIC BLOOD PRESSURE: 156 MMHG | OXYGEN SATURATION: 96 % | DIASTOLIC BLOOD PRESSURE: 77 MMHG | HEART RATE: 70 BPM

## 2023-08-16 VITALS — HEART RATE: 70 BPM | OXYGEN SATURATION: 94 % | SYSTOLIC BLOOD PRESSURE: 109 MMHG | DIASTOLIC BLOOD PRESSURE: 71 MMHG

## 2023-08-16 VITALS — HEART RATE: 59 BPM | OXYGEN SATURATION: 94 % | RESPIRATION RATE: 16 BRPM

## 2023-08-16 VITALS — OXYGEN SATURATION: 96 % | RESPIRATION RATE: 26 BRPM | HEART RATE: 68 BPM

## 2023-08-16 DIAGNOSIS — Z82.3: ICD-10-CM

## 2023-08-16 DIAGNOSIS — Z82.5: ICD-10-CM

## 2023-08-16 DIAGNOSIS — E66.01: ICD-10-CM

## 2023-08-16 DIAGNOSIS — F17.210: ICD-10-CM

## 2023-08-16 DIAGNOSIS — Z83.3: ICD-10-CM

## 2023-08-16 DIAGNOSIS — G47.33: ICD-10-CM

## 2023-08-16 DIAGNOSIS — I25.10: ICD-10-CM

## 2023-08-16 DIAGNOSIS — E44.0: ICD-10-CM

## 2023-08-16 DIAGNOSIS — J98.11: ICD-10-CM

## 2023-08-16 DIAGNOSIS — N18.9: ICD-10-CM

## 2023-08-16 DIAGNOSIS — K21.9: ICD-10-CM

## 2023-08-16 DIAGNOSIS — J44.1: ICD-10-CM

## 2023-08-16 DIAGNOSIS — I48.0: ICD-10-CM

## 2023-08-16 DIAGNOSIS — R79.89: ICD-10-CM

## 2023-08-16 DIAGNOSIS — Z99.81: ICD-10-CM

## 2023-08-16 DIAGNOSIS — F41.9: ICD-10-CM

## 2023-08-16 DIAGNOSIS — E78.5: ICD-10-CM

## 2023-08-16 DIAGNOSIS — I13.0: Primary | ICD-10-CM

## 2023-08-16 DIAGNOSIS — E11.22: ICD-10-CM

## 2023-08-16 DIAGNOSIS — Z90.49: ICD-10-CM

## 2023-08-16 DIAGNOSIS — Z82.49: ICD-10-CM

## 2023-08-16 DIAGNOSIS — I50.43: ICD-10-CM

## 2023-08-16 DIAGNOSIS — Z86.73: ICD-10-CM

## 2023-08-16 DIAGNOSIS — Z81.8: ICD-10-CM

## 2023-08-16 DIAGNOSIS — F32.A: ICD-10-CM

## 2023-08-16 DIAGNOSIS — Z98.61: ICD-10-CM

## 2023-08-16 DIAGNOSIS — J96.21: ICD-10-CM

## 2023-08-16 LAB
ALBUMIN SERPL-MCNC: 3 G/DL (ref 3.4–5)
ALP SERPL-CCNC: 67 U/L (ref 45–117)
ALT SERPL-CCNC: 26 U/L (ref 16–61)
ANION GAP SERPL CALCULATED.3IONS-SCNC: 6 MMOL/L (ref 5–15)
BASE EXCESS BLDV CALC-SCNC: 1.8 MMOL/L (ref -2–2)
BILIRUB SERPL-MCNC: 0.4 MG/DL (ref 0.2–1)
BUN SERPL-MCNC: 21 MG/DL (ref 7–18)
BUN/CREAT SERPL: 20.6 (ref 10–20)
CALCIUM SERPL-MCNC: 9.3 MG/DL (ref 8.5–10.1)
CHLORIDE SERPL-SCNC: 111 MMOL/L (ref 98–107)
CO2 SERPL-SCNC: 27 MMOL/L (ref 21–32)
GLUCOSE SERPL-MCNC: 106 MG/DL (ref 74–106)
HCT VFR BLD AUTO: 41 % (ref 41–53)
HGB BLD-MCNC: 13.2 G/DL (ref 13.5–17.5)
MAGNESIUM SERPL-MCNC: 2.7 MG/DL (ref 1.6–2.6)
MCH RBC QN AUTO: 29.9 PG (ref 28–32)
MCV RBC AUTO: 92.7 FL (ref 80–100)
NRBC BLD QL AUTO: 0.2 %
POTASSIUM SERPL-SCNC: 4.2 MMOL/L (ref 3.5–5.1)
PROT SERPL-MCNC: 6.6 G/DL (ref 6.4–8.2)
SODIUM SERPL-SCNC: 144 MMOL/L (ref 136–145)

## 2023-08-16 PROCEDURE — 81001 URINALYSIS AUTO W/SCOPE: CPT

## 2023-08-16 PROCEDURE — 85379 FIBRIN DEGRADATION QUANT: CPT

## 2023-08-16 PROCEDURE — 94660 CPAP INITIATION&MGMT: CPT

## 2023-08-16 PROCEDURE — 93306 TTE W/DOPPLER COMPLETE: CPT

## 2023-08-16 PROCEDURE — 96376 TX/PRO/DX INJ SAME DRUG ADON: CPT

## 2023-08-16 PROCEDURE — 36600 WITHDRAWAL OF ARTERIAL BLOOD: CPT

## 2023-08-16 PROCEDURE — 83735 ASSAY OF MAGNESIUM: CPT

## 2023-08-16 PROCEDURE — 93971 EXTREMITY STUDY: CPT

## 2023-08-16 PROCEDURE — 96375 TX/PRO/DX INJ NEW DRUG ADDON: CPT

## 2023-08-16 PROCEDURE — 80053 COMPREHEN METABOLIC PANEL: CPT

## 2023-08-16 PROCEDURE — 71045 X-RAY EXAM CHEST 1 VIEW: CPT

## 2023-08-16 PROCEDURE — 93005 ELECTROCARDIOGRAM TRACING: CPT

## 2023-08-16 PROCEDURE — 71275 CT ANGIOGRAPHY CHEST: CPT

## 2023-08-16 PROCEDURE — 85025 COMPLETE CBC W/AUTO DIFF WBC: CPT

## 2023-08-16 PROCEDURE — 82805 BLOOD GASES W/O2 SATURATION: CPT

## 2023-08-16 PROCEDURE — 94640 AIRWAY INHALATION TREATMENT: CPT

## 2023-08-16 PROCEDURE — 36415 COLL VENOUS BLD VENIPUNCTURE: CPT

## 2023-08-16 PROCEDURE — 87081 CULTURE SCREEN ONLY: CPT

## 2023-08-16 PROCEDURE — 5A09357 ASSISTANCE WITH RESPIRATORY VENTILATION, LESS THAN 24 CONSECUTIVE HOURS, CONTINUOUS POSITIVE AIRWAY PRESSURE: ICD-10-PCS | Performed by: INTERNAL MEDICINE

## 2023-08-16 PROCEDURE — 82962 GLUCOSE BLOOD TEST: CPT

## 2023-08-16 PROCEDURE — 96374 THER/PROPH/DIAG INJ IV PUSH: CPT

## 2023-08-16 PROCEDURE — 84484 ASSAY OF TROPONIN QUANT: CPT

## 2023-08-16 RX ADMIN — ATORVASTATIN CALCIUM SCH MG: 20 TABLET, FILM COATED ORAL at 23:07

## 2023-08-16 RX ADMIN — GABAPENTIN SCH MG: 400 CAPSULE ORAL at 23:07

## 2023-08-16 RX ADMIN — Medication SCH STRIP: at 23:08

## 2023-08-16 RX ADMIN — METHYLPREDNISOLONE SODIUM SUCCINATE SCH MG: 40 INJECTION, POWDER, FOR SOLUTION INTRAMUSCULAR; INTRAVENOUS at 23:05

## 2023-08-16 RX ADMIN — BUMETANIDE SCH MG: 1 TABLET ORAL at 23:08

## 2023-08-16 RX ADMIN — MORPHINE SULFATE PRN MG: 2 INJECTION, SOLUTION INTRAMUSCULAR; INTRAVENOUS at 22:10

## 2023-08-16 RX ADMIN — HUMAN INSULIN SCH UNITS: 100 INJECTION, SOLUTION SUBCUTANEOUS at 23:09

## 2023-08-16 RX ADMIN — SODIUM CHLORIDE SCH ML: 9 INJECTION INTRAMUSCULAR; INTRAVENOUS; SUBCUTANEOUS at 23:04

## 2023-08-17 VITALS — OXYGEN SATURATION: 93 % | RESPIRATION RATE: 14 BRPM | HEART RATE: 72 BPM

## 2023-08-17 VITALS — HEART RATE: 69 BPM | OXYGEN SATURATION: 97 %

## 2023-08-17 VITALS
HEART RATE: 52 BPM | RESPIRATION RATE: 20 BRPM | DIASTOLIC BLOOD PRESSURE: 55 MMHG | SYSTOLIC BLOOD PRESSURE: 113 MMHG | TEMPERATURE: 97.7 F | OXYGEN SATURATION: 96 %

## 2023-08-17 VITALS — HEART RATE: 81 BPM | OXYGEN SATURATION: 96 %

## 2023-08-17 VITALS — HEART RATE: 90 BPM | OXYGEN SATURATION: 93 %

## 2023-08-17 VITALS — RESPIRATION RATE: 18 BRPM | OXYGEN SATURATION: 96 % | HEART RATE: 70 BPM

## 2023-08-17 VITALS
TEMPERATURE: 98.1 F | HEART RATE: 72 BPM | OXYGEN SATURATION: 95 % | DIASTOLIC BLOOD PRESSURE: 87 MMHG | RESPIRATION RATE: 18 BRPM | SYSTOLIC BLOOD PRESSURE: 166 MMHG

## 2023-08-17 VITALS
HEART RATE: 93 BPM | RESPIRATION RATE: 20 BRPM | OXYGEN SATURATION: 95 % | DIASTOLIC BLOOD PRESSURE: 82 MMHG | TEMPERATURE: 98.6 F | SYSTOLIC BLOOD PRESSURE: 167 MMHG

## 2023-08-17 VITALS
DIASTOLIC BLOOD PRESSURE: 80 MMHG | OXYGEN SATURATION: 96 % | SYSTOLIC BLOOD PRESSURE: 149 MMHG | TEMPERATURE: 98.2 F | HEART RATE: 74 BPM | RESPIRATION RATE: 19 BRPM

## 2023-08-17 VITALS
DIASTOLIC BLOOD PRESSURE: 60 MMHG | HEART RATE: 60 BPM | SYSTOLIC BLOOD PRESSURE: 120 MMHG | RESPIRATION RATE: 21 BRPM | TEMPERATURE: 97.9 F | OXYGEN SATURATION: 91 %

## 2023-08-17 VITALS — OXYGEN SATURATION: 96 % | RESPIRATION RATE: 20 BRPM | HEART RATE: 67 BPM

## 2023-08-17 VITALS — HEART RATE: 68 BPM | RESPIRATION RATE: 18 BRPM | OXYGEN SATURATION: 97 %

## 2023-08-17 VITALS — OXYGEN SATURATION: 96 % | RESPIRATION RATE: 20 BRPM | HEART RATE: 52 BPM

## 2023-08-17 VITALS
DIASTOLIC BLOOD PRESSURE: 63 MMHG | HEART RATE: 73 BPM | OXYGEN SATURATION: 98 % | RESPIRATION RATE: 20 BRPM | SYSTOLIC BLOOD PRESSURE: 143 MMHG

## 2023-08-17 VITALS — HEART RATE: 65 BPM | OXYGEN SATURATION: 93 % | RESPIRATION RATE: 18 BRPM

## 2023-08-17 VITALS — RESPIRATION RATE: 18 BRPM | HEART RATE: 78 BPM | OXYGEN SATURATION: 94 %

## 2023-08-17 VITALS — OXYGEN SATURATION: 98 % | RESPIRATION RATE: 20 BRPM | HEART RATE: 67 BPM

## 2023-08-17 LAB
ALBUMIN SERPL-MCNC: 2.8 G/DL (ref 3.4–5)
ALP SERPL-CCNC: 62 U/L (ref 45–117)
ALT SERPL-CCNC: 23 U/L (ref 16–61)
ANION GAP SERPL CALCULATED.3IONS-SCNC: 5 MMOL/L (ref 5–15)
BILIRUB SERPL-MCNC: 0.3 MG/DL (ref 0.2–1)
BUN SERPL-MCNC: 25 MG/DL (ref 7–18)
BUN/CREAT SERPL: 18.1 (ref 10–20)
CALCIUM SERPL-MCNC: 9.3 MG/DL (ref 8.5–10.1)
CHLORIDE SERPL-SCNC: 112 MMOL/L (ref 98–107)
CO2 SERPL-SCNC: 29 MMOL/L (ref 21–32)
GLUCOSE SERPL-MCNC: 215 MG/DL (ref 74–106)
HCT VFR BLD AUTO: 39.5 % (ref 41–53)
HGB BLD-MCNC: 12.9 G/DL (ref 13.5–17.5)
MCH RBC QN AUTO: 29.9 PG (ref 28–32)
MCV RBC AUTO: 91.9 FL (ref 80–100)
NRBC BLD QL AUTO: 0.1 %
POTASSIUM SERPL-SCNC: 4 MMOL/L (ref 3.5–5.1)
PROT SERPL-MCNC: 6.6 G/DL (ref 6.4–8.2)
SODIUM SERPL-SCNC: 146 MMOL/L (ref 136–145)

## 2023-08-17 PROCEDURE — 5A09357 ASSISTANCE WITH RESPIRATORY VENTILATION, LESS THAN 24 CONSECUTIVE HOURS, CONTINUOUS POSITIVE AIRWAY PRESSURE: ICD-10-PCS | Performed by: INTERNAL MEDICINE

## 2023-08-17 RX ADMIN — Medication SCH STRIP: at 17:00

## 2023-08-17 RX ADMIN — MORPHINE SULFATE PRN MG: 2 INJECTION, SOLUTION INTRAMUSCULAR; INTRAVENOUS at 20:47

## 2023-08-17 RX ADMIN — METHYLPREDNISOLONE SODIUM SUCCINATE SCH MG: 40 INJECTION, POWDER, FOR SOLUTION INTRAMUSCULAR; INTRAVENOUS at 10:24

## 2023-08-17 RX ADMIN — CLOPIDOGREL BISULFATE SCH MG: 75 TABLET ORAL at 10:21

## 2023-08-17 RX ADMIN — IPRATROPIUM BROMIDE SCH MG: 0.5 SOLUTION RESPIRATORY (INHALATION) at 01:11

## 2023-08-17 RX ADMIN — ATORVASTATIN CALCIUM SCH MG: 20 TABLET, FILM COATED ORAL at 22:10

## 2023-08-17 RX ADMIN — METHYLPREDNISOLONE SODIUM SUCCINATE SCH MG: 40 INJECTION, POWDER, FOR SOLUTION INTRAMUSCULAR; INTRAVENOUS at 22:11

## 2023-08-17 RX ADMIN — ASPIRIN SCH MG: 81 TABLET ORAL at 10:23

## 2023-08-17 RX ADMIN — SODIUM CHLORIDE SCH ML: 9 INJECTION INTRAMUSCULAR; INTRAVENOUS; SUBCUTANEOUS at 06:14

## 2023-08-17 RX ADMIN — SODIUM CHLORIDE SCH ML: 9 INJECTION INTRAMUSCULAR; INTRAVENOUS; SUBCUTANEOUS at 14:00

## 2023-08-17 RX ADMIN — ISOSORBIDE MONONITRATE SCH MG: 60 TABLET, EXTENDED RELEASE ORAL at 10:22

## 2023-08-17 RX ADMIN — BENAZEPRIL HYDROCHLORIDE SCH MG: 10 TABLET, FILM COATED ORAL at 10:21

## 2023-08-17 RX ADMIN — ALBUTEROL SULFATE SCH MG: 2.5 SOLUTION RESPIRATORY (INHALATION) at 06:54

## 2023-08-17 RX ADMIN — IPRATROPIUM BROMIDE SCH MG: 0.5 SOLUTION RESPIRATORY (INHALATION) at 06:54

## 2023-08-17 RX ADMIN — HUMAN INSULIN SCH UNITS: 100 INJECTION, SOLUTION SUBCUTANEOUS at 06:23

## 2023-08-17 RX ADMIN — ALBUTEROL SULFATE SCH MG: 2.5 SOLUTION RESPIRATORY (INHALATION) at 01:11

## 2023-08-17 RX ADMIN — IPRATROPIUM BROMIDE SCH MG: 0.5 SOLUTION RESPIRATORY (INHALATION) at 18:09

## 2023-08-17 RX ADMIN — HUMAN INSULIN SCH UNITS: 100 INJECTION, SOLUTION SUBCUTANEOUS at 18:08

## 2023-08-17 RX ADMIN — HUMAN INSULIN SCH UNITS: 100 INJECTION, SOLUTION SUBCUTANEOUS at 12:39

## 2023-08-17 RX ADMIN — HYDROCODONE BITARTRATE AND ACETAMINOPHEN PRN TAB: 10; 325 TABLET ORAL at 15:14

## 2023-08-17 RX ADMIN — GABAPENTIN SCH MG: 400 CAPSULE ORAL at 10:23

## 2023-08-17 RX ADMIN — HUMAN INSULIN SCH UNITS: 100 INJECTION, SOLUTION SUBCUTANEOUS at 22:16

## 2023-08-17 RX ADMIN — Medication SCH STRIP: at 22:13

## 2023-08-17 RX ADMIN — ALBUTEROL SULFATE SCH MG: 2.5 SOLUTION RESPIRATORY (INHALATION) at 18:09

## 2023-08-17 RX ADMIN — ALBUTEROL SULFATE SCH MG: 2.5 SOLUTION RESPIRATORY (INHALATION) at 12:54

## 2023-08-17 RX ADMIN — IPRATROPIUM BROMIDE SCH MG: 0.5 SOLUTION RESPIRATORY (INHALATION) at 12:54

## 2023-08-17 RX ADMIN — SODIUM CHLORIDE SCH MG: 9 INJECTION, SOLUTION INTRAVENOUS at 18:16

## 2023-08-17 RX ADMIN — ENOXAPARIN SODIUM SCH MG: 40 INJECTION SUBCUTANEOUS at 10:25

## 2023-08-17 RX ADMIN — GABAPENTIN SCH MG: 400 CAPSULE ORAL at 22:09

## 2023-08-17 RX ADMIN — BUMETANIDE SCH MG: 1 TABLET ORAL at 10:22

## 2023-08-17 RX ADMIN — Medication SCH STRIP: at 11:30

## 2023-08-17 RX ADMIN — MORPHINE SULFATE PRN MG: 2 INJECTION, SOLUTION INTRAMUSCULAR; INTRAVENOUS at 05:28

## 2023-08-17 RX ADMIN — Medication SCH STRIP: at 06:15

## 2023-08-17 RX ADMIN — SODIUM CHLORIDE SCH ML: 9 INJECTION INTRAMUSCULAR; INTRAVENOUS; SUBCUTANEOUS at 22:11

## 2023-08-18 VITALS — OXYGEN SATURATION: 96 % | RESPIRATION RATE: 18 BRPM | HEART RATE: 71 BPM

## 2023-08-18 VITALS — RESPIRATION RATE: 18 BRPM | OXYGEN SATURATION: 95 % | HEART RATE: 57 BPM

## 2023-08-18 VITALS — HEART RATE: 56 BPM | OXYGEN SATURATION: 93 % | RESPIRATION RATE: 18 BRPM

## 2023-08-18 VITALS
HEART RATE: 60 BPM | OXYGEN SATURATION: 96 % | TEMPERATURE: 97.6 F | RESPIRATION RATE: 18 BRPM | DIASTOLIC BLOOD PRESSURE: 68 MMHG | SYSTOLIC BLOOD PRESSURE: 128 MMHG

## 2023-08-18 VITALS
SYSTOLIC BLOOD PRESSURE: 138 MMHG | HEART RATE: 54 BPM | OXYGEN SATURATION: 96 % | DIASTOLIC BLOOD PRESSURE: 52 MMHG | TEMPERATURE: 97.5 F | RESPIRATION RATE: 19 BRPM

## 2023-08-18 VITALS
DIASTOLIC BLOOD PRESSURE: 59 MMHG | TEMPERATURE: 97.9 F | SYSTOLIC BLOOD PRESSURE: 124 MMHG | HEART RATE: 52 BPM | OXYGEN SATURATION: 94 % | RESPIRATION RATE: 20 BRPM

## 2023-08-18 VITALS — HEART RATE: 63 BPM | RESPIRATION RATE: 18 BRPM | OXYGEN SATURATION: 97 %

## 2023-08-18 VITALS — RESPIRATION RATE: 18 BRPM | OXYGEN SATURATION: 95 % | HEART RATE: 62 BPM

## 2023-08-18 VITALS — RESPIRATION RATE: 16 BRPM | HEART RATE: 53 BPM | OXYGEN SATURATION: 94 %

## 2023-08-18 VITALS — OXYGEN SATURATION: 96 % | HEART RATE: 63 BPM

## 2023-08-18 VITALS — OXYGEN SATURATION: 95 % | HEART RATE: 55 BPM

## 2023-08-18 VITALS — OXYGEN SATURATION: 94 % | RESPIRATION RATE: 16 BRPM | HEART RATE: 47 BPM

## 2023-08-18 VITALS — OXYGEN SATURATION: 93 %

## 2023-08-18 VITALS — OXYGEN SATURATION: 93 % | HEART RATE: 45 BPM | RESPIRATION RATE: 18 BRPM

## 2023-08-18 PROCEDURE — 5A09357 ASSISTANCE WITH RESPIRATORY VENTILATION, LESS THAN 24 CONSECUTIVE HOURS, CONTINUOUS POSITIVE AIRWAY PRESSURE: ICD-10-PCS | Performed by: INTERNAL MEDICINE

## 2023-08-18 RX ADMIN — IPRATROPIUM BROMIDE SCH MG: 0.5 SOLUTION RESPIRATORY (INHALATION) at 19:07

## 2023-08-18 RX ADMIN — MORPHINE SULFATE PRN MG: 2 INJECTION, SOLUTION INTRAMUSCULAR; INTRAVENOUS at 02:18

## 2023-08-18 RX ADMIN — HUMAN INSULIN SCH UNITS: 100 INJECTION, SOLUTION SUBCUTANEOUS at 17:00

## 2023-08-18 RX ADMIN — Medication SCH STRIP: at 12:08

## 2023-08-18 RX ADMIN — ENOXAPARIN SODIUM SCH MG: 40 INJECTION SUBCUTANEOUS at 09:25

## 2023-08-18 RX ADMIN — MORPHINE SULFATE PRN MG: 2 INJECTION, SOLUTION INTRAMUSCULAR; INTRAVENOUS at 12:08

## 2023-08-18 RX ADMIN — SODIUM CHLORIDE SCH MG: 9 INJECTION, SOLUTION INTRAVENOUS at 17:01

## 2023-08-18 RX ADMIN — SODIUM CHLORIDE SCH ML: 9 INJECTION INTRAMUSCULAR; INTRAVENOUS; SUBCUTANEOUS at 22:27

## 2023-08-18 RX ADMIN — HUMAN INSULIN SCH UNITS: 100 INJECTION, SOLUTION SUBCUTANEOUS at 12:07

## 2023-08-18 RX ADMIN — HUMAN INSULIN SCH UNITS: 100 INJECTION, SOLUTION SUBCUTANEOUS at 06:24

## 2023-08-18 RX ADMIN — Medication SCH STRIP: at 17:04

## 2023-08-18 RX ADMIN — HUMAN INSULIN SCH UNITS: 100 INJECTION, SOLUTION SUBCUTANEOUS at 22:37

## 2023-08-18 RX ADMIN — GABAPENTIN SCH MG: 400 CAPSULE ORAL at 09:26

## 2023-08-18 RX ADMIN — ASPIRIN SCH MG: 81 TABLET ORAL at 09:26

## 2023-08-18 RX ADMIN — SODIUM CHLORIDE SCH ML: 9 INJECTION INTRAMUSCULAR; INTRAVENOUS; SUBCUTANEOUS at 06:17

## 2023-08-18 RX ADMIN — IPRATROPIUM BROMIDE SCH MG: 0.5 SOLUTION RESPIRATORY (INHALATION) at 11:55

## 2023-08-18 RX ADMIN — ISOSORBIDE MONONITRATE SCH MG: 60 TABLET, EXTENDED RELEASE ORAL at 10:00

## 2023-08-18 RX ADMIN — GABAPENTIN SCH MG: 400 CAPSULE ORAL at 22:28

## 2023-08-18 RX ADMIN — METHYLPREDNISOLONE SODIUM SUCCINATE SCH MG: 40 INJECTION, POWDER, FOR SOLUTION INTRAMUSCULAR; INTRAVENOUS at 09:26

## 2023-08-18 RX ADMIN — ALBUTEROL SULFATE SCH MG: 2.5 SOLUTION RESPIRATORY (INHALATION) at 19:07

## 2023-08-18 RX ADMIN — METHYLPREDNISOLONE SODIUM SUCCINATE SCH MG: 40 INJECTION, POWDER, FOR SOLUTION INTRAMUSCULAR; INTRAVENOUS at 22:27

## 2023-08-18 RX ADMIN — IPRATROPIUM BROMIDE SCH MG: 0.5 SOLUTION RESPIRATORY (INHALATION) at 00:26

## 2023-08-18 RX ADMIN — BENAZEPRIL HYDROCHLORIDE SCH MG: 10 TABLET, FILM COATED ORAL at 10:00

## 2023-08-18 RX ADMIN — IPRATROPIUM BROMIDE SCH MG: 0.5 SOLUTION RESPIRATORY (INHALATION) at 06:25

## 2023-08-18 RX ADMIN — CLOPIDOGREL BISULFATE SCH MG: 75 TABLET ORAL at 09:27

## 2023-08-18 RX ADMIN — Medication SCH STRIP: at 06:22

## 2023-08-18 RX ADMIN — ALBUTEROL SULFATE SCH MG: 2.5 SOLUTION RESPIRATORY (INHALATION) at 06:25

## 2023-08-18 RX ADMIN — ALBUTEROL SULFATE SCH MG: 2.5 SOLUTION RESPIRATORY (INHALATION) at 11:55

## 2023-08-18 RX ADMIN — MORPHINE SULFATE PRN MG: 2 INJECTION, SOLUTION INTRAMUSCULAR; INTRAVENOUS at 17:02

## 2023-08-18 RX ADMIN — MORPHINE SULFATE PRN MG: 2 INJECTION, SOLUTION INTRAMUSCULAR; INTRAVENOUS at 07:07

## 2023-08-18 RX ADMIN — ATORVASTATIN CALCIUM SCH MG: 20 TABLET, FILM COATED ORAL at 22:28

## 2023-08-18 RX ADMIN — Medication SCH STRIP: at 22:28

## 2023-08-18 RX ADMIN — ALBUTEROL SULFATE SCH MG: 2.5 SOLUTION RESPIRATORY (INHALATION) at 00:26

## 2023-08-18 RX ADMIN — SODIUM CHLORIDE SCH MG: 9 INJECTION, SOLUTION INTRAVENOUS at 06:18

## 2023-08-18 RX ADMIN — HYDROCODONE BITARTRATE AND ACETAMINOPHEN PRN TAB: 10; 325 TABLET ORAL at 09:40

## 2023-08-18 RX ADMIN — SODIUM CHLORIDE SCH ML: 9 INJECTION INTRAMUSCULAR; INTRAVENOUS; SUBCUTANEOUS at 14:11

## 2023-08-18 RX ADMIN — MORPHINE SULFATE PRN MG: 2 INJECTION, SOLUTION INTRAMUSCULAR; INTRAVENOUS at 21:24

## 2023-08-19 VITALS — HEART RATE: 54 BPM | OXYGEN SATURATION: 98 % | RESPIRATION RATE: 20 BRPM

## 2023-08-19 VITALS — RESPIRATION RATE: 18 BRPM | OXYGEN SATURATION: 95 % | HEART RATE: 57 BPM

## 2023-08-19 VITALS — OXYGEN SATURATION: 96 % | HEART RATE: 63 BPM | RESPIRATION RATE: 18 BRPM

## 2023-08-19 VITALS — RESPIRATION RATE: 18 BRPM | HEART RATE: 59 BPM | OXYGEN SATURATION: 98 %

## 2023-08-19 VITALS — HEART RATE: 60 BPM | OXYGEN SATURATION: 95 %

## 2023-08-19 VITALS — OXYGEN SATURATION: 96 % | RESPIRATION RATE: 18 BRPM | HEART RATE: 51 BPM

## 2023-08-19 VITALS
SYSTOLIC BLOOD PRESSURE: 125 MMHG | OXYGEN SATURATION: 96 % | DIASTOLIC BLOOD PRESSURE: 69 MMHG | RESPIRATION RATE: 20 BRPM | HEART RATE: 56 BPM | TEMPERATURE: 97.3 F

## 2023-08-19 VITALS — OXYGEN SATURATION: 96 % | HEART RATE: 60 BPM

## 2023-08-19 VITALS — RESPIRATION RATE: 20 BRPM | OXYGEN SATURATION: 95 % | HEART RATE: 57 BPM

## 2023-08-19 VITALS
HEART RATE: 49 BPM | SYSTOLIC BLOOD PRESSURE: 118 MMHG | DIASTOLIC BLOOD PRESSURE: 53 MMHG | TEMPERATURE: 97 F | RESPIRATION RATE: 18 BRPM | OXYGEN SATURATION: 96 %

## 2023-08-19 PROCEDURE — 5A09357 ASSISTANCE WITH RESPIRATORY VENTILATION, LESS THAN 24 CONSECUTIVE HOURS, CONTINUOUS POSITIVE AIRWAY PRESSURE: ICD-10-PCS | Performed by: INTERNAL MEDICINE

## 2023-08-19 RX ADMIN — ISOSORBIDE MONONITRATE SCH MG: 60 TABLET, EXTENDED RELEASE ORAL at 09:19

## 2023-08-19 RX ADMIN — Medication SCH STRIP: at 11:39

## 2023-08-19 RX ADMIN — ALBUTEROL SULFATE SCH MG: 2.5 SOLUTION RESPIRATORY (INHALATION) at 06:41

## 2023-08-19 RX ADMIN — GABAPENTIN SCH MG: 400 CAPSULE ORAL at 09:18

## 2023-08-19 RX ADMIN — MORPHINE SULFATE PRN MG: 2 INJECTION, SOLUTION INTRAMUSCULAR; INTRAVENOUS at 01:39

## 2023-08-19 RX ADMIN — ASPIRIN SCH MG: 81 TABLET ORAL at 09:18

## 2023-08-19 RX ADMIN — ENOXAPARIN SODIUM SCH MG: 40 INJECTION SUBCUTANEOUS at 09:18

## 2023-08-19 RX ADMIN — HYDROCODONE BITARTRATE AND ACETAMINOPHEN PRN TAB: 10; 325 TABLET ORAL at 11:30

## 2023-08-19 RX ADMIN — MORPHINE SULFATE PRN MG: 2 INJECTION, SOLUTION INTRAMUSCULAR; INTRAVENOUS at 06:42

## 2023-08-19 RX ADMIN — ALBUTEROL SULFATE SCH MG: 2.5 SOLUTION RESPIRATORY (INHALATION) at 00:17

## 2023-08-19 RX ADMIN — Medication SCH STRIP: at 06:27

## 2023-08-19 RX ADMIN — IPRATROPIUM BROMIDE SCH MG: 0.5 SOLUTION RESPIRATORY (INHALATION) at 00:17

## 2023-08-19 RX ADMIN — SODIUM CHLORIDE SCH MG: 9 INJECTION, SOLUTION INTRAVENOUS at 05:04

## 2023-08-19 RX ADMIN — IPRATROPIUM BROMIDE SCH MG: 0.5 SOLUTION RESPIRATORY (INHALATION) at 06:41

## 2023-08-19 RX ADMIN — CLOPIDOGREL BISULFATE SCH MG: 75 TABLET ORAL at 09:19

## 2023-08-19 RX ADMIN — HUMAN INSULIN SCH UNITS: 100 INJECTION, SOLUTION SUBCUTANEOUS at 06:28

## 2023-08-19 RX ADMIN — BENAZEPRIL HYDROCHLORIDE SCH MG: 10 TABLET, FILM COATED ORAL at 11:13

## 2023-08-19 RX ADMIN — METHYLPREDNISOLONE SODIUM SUCCINATE SCH MG: 40 INJECTION, POWDER, FOR SOLUTION INTRAMUSCULAR; INTRAVENOUS at 09:17

## 2023-08-19 RX ADMIN — ALBUTEROL SULFATE SCH MG: 2.5 SOLUTION RESPIRATORY (INHALATION) at 12:16

## 2023-08-19 RX ADMIN — SODIUM CHLORIDE SCH ML: 9 INJECTION INTRAMUSCULAR; INTRAVENOUS; SUBCUTANEOUS at 05:05

## 2023-08-19 RX ADMIN — IPRATROPIUM BROMIDE SCH MG: 0.5 SOLUTION RESPIRATORY (INHALATION) at 12:16

## 2023-08-19 RX ADMIN — HUMAN INSULIN SCH UNITS: 100 INJECTION, SOLUTION SUBCUTANEOUS at 11:39

## 2023-11-09 ENCOUNTER — HOSPITAL ENCOUNTER (INPATIENT)
Dept: HOSPITAL 15 - ER | Age: 68
LOS: 4 days | Discharge: HOME | DRG: 698 | End: 2023-11-13
Attending: FAMILY MEDICINE | Admitting: INTERNAL MEDICINE
Payer: COMMERCIAL

## 2023-11-09 VITALS — BODY MASS INDEX: 47.74 KG/M2 | WEIGHT: 315 LBS | HEIGHT: 68 IN

## 2023-11-09 VITALS — OXYGEN SATURATION: 95 % | RESPIRATION RATE: 20 BRPM | HEART RATE: 20 BPM

## 2023-11-09 DIAGNOSIS — Z82.49: ICD-10-CM
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
## 2023-11-09 DIAGNOSIS — I13.0: ICD-10-CM

## 2023-11-09 DIAGNOSIS — J44.89: ICD-10-CM

## 2023-11-09 DIAGNOSIS — Z90.49: ICD-10-CM

## 2023-11-09 DIAGNOSIS — S31.119A: ICD-10-CM
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
## 2023-11-09 DIAGNOSIS — N39.0: ICD-10-CM

## 2023-11-09 DIAGNOSIS — E78.5: ICD-10-CM

## 2023-11-09 DIAGNOSIS — N18.9: ICD-10-CM

## 2023-11-09 DIAGNOSIS — A41.9: ICD-10-CM

## 2023-11-09 DIAGNOSIS — N17.0: ICD-10-CM

## 2023-11-09 DIAGNOSIS — E66.01: ICD-10-CM
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
## 2023-11-09 DIAGNOSIS — Z74.01: ICD-10-CM

## 2023-11-09 DIAGNOSIS — I50.23: ICD-10-CM

## 2023-11-09 DIAGNOSIS — Z81.8: ICD-10-CM

## 2023-11-09 DIAGNOSIS — Z95.5: ICD-10-CM

## 2023-11-09 DIAGNOSIS — B95.8: ICD-10-CM

## 2023-11-09 DIAGNOSIS — Z86.73: ICD-10-CM

## 2023-11-09 DIAGNOSIS — Z82.3: ICD-10-CM

## 2023-11-09 DIAGNOSIS — Z83.3: ICD-10-CM

## 2023-11-09 DIAGNOSIS — I25.2: ICD-10-CM
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
## 2023-11-09 DIAGNOSIS — I25.10: ICD-10-CM

## 2023-11-09 DIAGNOSIS — I48.91: ICD-10-CM

## 2023-11-09 DIAGNOSIS — R33.9: ICD-10-CM

## 2023-11-09 DIAGNOSIS — Z82.5: ICD-10-CM

## 2023-11-09 DIAGNOSIS — E11.22: ICD-10-CM

## 2023-11-09 DIAGNOSIS — K21.9: ICD-10-CM

## 2023-11-09 DIAGNOSIS — T83.511A: Primary | ICD-10-CM

## 2023-11-09 LAB
ALBUMIN SERPL-MCNC: 3.8 G/DL (ref 3.2–4.8)
ALP SERPL-CCNC: 81 U/L (ref 46–116)
ALT SERPL-CCNC: 35 U/L (ref 7–40)
ANION GAP SERPL CALCULATED.3IONS-SCNC: 6 MMOL/L (ref 5–15)
APTT PPP: 26.8 SEC (ref 24.5–34.5)
BILIRUB SERPL-MCNC: 0.4 MG/DL (ref 0.2–1)
BUN SERPL-MCNC: 39 MG/DL (ref 9–23)
BUN/CREAT SERPL: 21.2 (ref 10–20)
CALCIUM SERPL-MCNC: 9.5 MG/DL (ref 8.5–10.1)
CHLORIDE SERPL-SCNC: 102 MMOL/L (ref 98–107)
CO2 SERPL-SCNC: 34 MMOL/L (ref 20–30)
GLUCOSE SERPL-MCNC: 98 MG/DL (ref 74–106)
HCT VFR BLD AUTO: 35.6 % (ref 41–53)
HGB BLD-MCNC: 11.5 G/DL (ref 13.5–17.5)
INR PPP: 1.05 (ref 0.9–1.15)
MCH RBC QN AUTO: 29.1 PG (ref 28–32)
MCV RBC AUTO: 90.6 FL (ref 80–100)
NRBC BLD QL AUTO: 0 %
POTASSIUM SERPL-SCNC: 4.1 MMOL/L (ref 3.5–5.1)
PROT SERPL-MCNC: 6.6 G/DL (ref 5.7–8.2)
PROTHROMBIN TIME: 11 SEC (ref 9.3–11.8)
SODIUM SERPL-SCNC: 142 MMOL/L (ref 136–145)

## 2023-11-09 PROCEDURE — 71045 X-RAY EXAM CHEST 1 VIEW: CPT

## 2023-11-09 PROCEDURE — 80048 BASIC METABOLIC PNL TOTAL CA: CPT

## 2023-11-09 PROCEDURE — 83735 ASSAY OF MAGNESIUM: CPT

## 2023-11-09 PROCEDURE — 80053 COMPREHEN METABOLIC PANEL: CPT

## 2023-11-09 PROCEDURE — 87186 SC STD MICRODIL/AGAR DIL: CPT

## 2023-11-09 PROCEDURE — 84443 ASSAY THYROID STIM HORMONE: CPT

## 2023-11-09 PROCEDURE — 97163 PT EVAL HIGH COMPLEX 45 MIN: CPT

## 2023-11-09 PROCEDURE — 85730 THROMBOPLASTIN TIME PARTIAL: CPT

## 2023-11-09 PROCEDURE — 96375 TX/PRO/DX INJ NEW DRUG ADDON: CPT

## 2023-11-09 PROCEDURE — 81001 URINALYSIS AUTO W/SCOPE: CPT

## 2023-11-09 PROCEDURE — 85025 COMPLETE CBC W/AUTO DIFF WBC: CPT

## 2023-11-09 PROCEDURE — 83605 ASSAY OF LACTIC ACID: CPT

## 2023-11-09 PROCEDURE — 84484 ASSAY OF TROPONIN QUANT: CPT

## 2023-11-09 PROCEDURE — 83880 ASSAY OF NATRIURETIC PEPTIDE: CPT

## 2023-11-09 PROCEDURE — 93005 ELECTROCARDIOGRAM TRACING: CPT

## 2023-11-09 PROCEDURE — 96374 THER/PROPH/DIAG INJ IV PUSH: CPT

## 2023-11-09 PROCEDURE — 85610 PROTHROMBIN TIME: CPT

## 2023-11-09 PROCEDURE — 87086 URINE CULTURE/COLONY COUNT: CPT

## 2023-11-09 PROCEDURE — 36415 COLL VENOUS BLD VENIPUNCTURE: CPT

## 2023-11-09 PROCEDURE — 87040 BLOOD CULTURE FOR BACTERIA: CPT

## 2023-11-09 PROCEDURE — 87088 URINE BACTERIA CULTURE: CPT

## 2023-11-09 PROCEDURE — 97116 GAIT TRAINING THERAPY: CPT

## 2023-11-09 PROCEDURE — 74176 CT ABD & PELVIS W/O CONTRAST: CPT

## 2023-11-09 RX ADMIN — CEFTRIAXONE SODIUM SCH MLS/HR: 1 INJECTION, POWDER, FOR SOLUTION INTRAMUSCULAR; INTRAVENOUS at 21:13

## 2023-11-10 VITALS — RESPIRATION RATE: 12 BRPM | OXYGEN SATURATION: 98 % | HEART RATE: 55 BPM

## 2023-11-10 VITALS — HEART RATE: 60 BPM | OXYGEN SATURATION: 95 % | RESPIRATION RATE: 16 BRPM

## 2023-11-10 RX ADMIN — CEFTRIAXONE SODIUM SCH MLS/HR: 1 INJECTION, POWDER, FOR SOLUTION INTRAMUSCULAR; INTRAVENOUS at 12:17

## 2023-11-10 RX ADMIN — ATORVASTATIN CALCIUM SCH MG: 20 TABLET, FILM COATED ORAL at 22:30

## 2023-11-10 RX ADMIN — FLUCONAZOLE IN SODIUM CHLORIDE SCH MLS/HR: 2 INJECTION, SOLUTION INTRAVENOUS at 13:52

## 2023-11-10 RX ADMIN — HYDROCODONE BITARTRATE AND ACETAMINOPHEN PRN TAB: 10; 325 TABLET ORAL at 14:41

## 2023-11-10 RX ADMIN — MORPHINE SULFATE PRN MG: 2 INJECTION, SOLUTION INTRAMUSCULAR; INTRAVENOUS at 10:04

## 2023-11-10 RX ADMIN — DEXTROSE SCH UNITS: 5 SOLUTION INTRAVENOUS at 22:24

## 2023-11-10 RX ADMIN — HYDROCODONE BITARTRATE AND ACETAMINOPHEN PRN TAB: 10; 325 TABLET ORAL at 18:49

## 2023-11-10 RX ADMIN — CEFEPIME SCH MLS/HR: 2 INJECTION, POWDER, FOR SOLUTION INTRAMUSCULAR; INTRAVENOUS at 15:05

## 2023-11-10 RX ADMIN — BUMETANIDE SCH MG: 1 TABLET ORAL at 18:48

## 2023-11-10 RX ADMIN — METOPROLOL TARTRATE SCH MG: 25 TABLET, FILM COATED ORAL at 22:00

## 2023-11-11 VITALS
HEART RATE: 58 BPM | RESPIRATION RATE: 18 BRPM | OXYGEN SATURATION: 97 % | SYSTOLIC BLOOD PRESSURE: 128 MMHG | DIASTOLIC BLOOD PRESSURE: 58 MMHG | TEMPERATURE: 98.6 F

## 2023-11-11 VITALS
TEMPERATURE: 97.9 F | SYSTOLIC BLOOD PRESSURE: 128 MMHG | HEART RATE: 48 BPM | RESPIRATION RATE: 18 BRPM | OXYGEN SATURATION: 96 % | DIASTOLIC BLOOD PRESSURE: 59 MMHG

## 2023-11-11 VITALS
DIASTOLIC BLOOD PRESSURE: 59 MMHG | RESPIRATION RATE: 19 BRPM | TEMPERATURE: 97.7 F | HEART RATE: 54 BPM | OXYGEN SATURATION: 99 % | SYSTOLIC BLOOD PRESSURE: 137 MMHG

## 2023-11-11 VITALS — OXYGEN SATURATION: 96 %

## 2023-11-11 VITALS
TEMPERATURE: 98.2 F | RESPIRATION RATE: 17 BRPM | OXYGEN SATURATION: 98 % | SYSTOLIC BLOOD PRESSURE: 140 MMHG | DIASTOLIC BLOOD PRESSURE: 60 MMHG | HEART RATE: 74 BPM

## 2023-11-11 VITALS — HEART RATE: 57 BPM

## 2023-11-11 VITALS
OXYGEN SATURATION: 96 % | TEMPERATURE: 98.6 F | RESPIRATION RATE: 17 BRPM | HEART RATE: 51 BPM | SYSTOLIC BLOOD PRESSURE: 141 MMHG | DIASTOLIC BLOOD PRESSURE: 45 MMHG

## 2023-11-11 VITALS — HEART RATE: 72 BPM

## 2023-11-11 VITALS
RESPIRATION RATE: 17 BRPM | SYSTOLIC BLOOD PRESSURE: 123 MMHG | TEMPERATURE: 97.8 F | HEART RATE: 60 BPM | DIASTOLIC BLOOD PRESSURE: 39 MMHG | OXYGEN SATURATION: 99 %

## 2023-11-11 VITALS — RESPIRATION RATE: 18 BRPM | OXYGEN SATURATION: 97 % | HEART RATE: 58 BPM

## 2023-11-11 LAB
ANION GAP SERPL CALCULATED.3IONS-SCNC: 5 MMOL/L (ref 5–15)
BUN SERPL-MCNC: 46 MG/DL (ref 9–23)
BUN/CREAT SERPL: 22.2 (ref 10–20)
CALCIUM SERPL-MCNC: 9 MG/DL (ref 8.7–10.4)
CHLORIDE SERPL-SCNC: 105 MMOL/L (ref 98–107)
CO2 SERPL-SCNC: 32 MMOL/L (ref 20–30)
GLUCOSE SERPL-MCNC: 84 MG/DL (ref 74–106)
HCT VFR BLD AUTO: 35.6 % (ref 41–53)
HGB BLD-MCNC: 11.5 G/DL (ref 13.5–17.5)
MCH RBC QN AUTO: 29.2 PG (ref 28–32)
MCV RBC AUTO: 90.8 FL (ref 80–100)
NRBC BLD QL AUTO: 0.1 %
POTASSIUM SERPL-SCNC: 4.3 MMOL/L (ref 3.5–5.1)
SODIUM SERPL-SCNC: 142 MMOL/L (ref 136–145)

## 2023-11-11 RX ADMIN — BUMETANIDE SCH MG: 1 TABLET ORAL at 22:39

## 2023-11-11 RX ADMIN — MORPHINE SULFATE PRN MG: 2 INJECTION, SOLUTION INTRAMUSCULAR; INTRAVENOUS at 05:30

## 2023-11-11 RX ADMIN — CEFEPIME SCH MLS/HR: 2 INJECTION, POWDER, FOR SOLUTION INTRAMUSCULAR; INTRAVENOUS at 22:38

## 2023-11-11 RX ADMIN — HYDROCODONE BITARTRATE AND ACETAMINOPHEN PRN TAB: 10; 325 TABLET ORAL at 19:51

## 2023-11-11 RX ADMIN — CEFEPIME SCH MLS/HR: 2 INJECTION, POWDER, FOR SOLUTION INTRAMUSCULAR; INTRAVENOUS at 02:41

## 2023-11-11 RX ADMIN — ISOSORBIDE MONONITRATE SCH MG: 60 TABLET, EXTENDED RELEASE ORAL at 12:05

## 2023-11-11 RX ADMIN — BUMETANIDE SCH MG: 1 TABLET ORAL at 18:29

## 2023-11-11 RX ADMIN — CEFEPIME SCH MLS/HR: 2 INJECTION, POWDER, FOR SOLUTION INTRAMUSCULAR; INTRAVENOUS at 14:59

## 2023-11-11 RX ADMIN — BUMETANIDE SCH MG: 1 TABLET ORAL at 05:31

## 2023-11-11 RX ADMIN — MORPHINE SULFATE PRN MG: 2 INJECTION, SOLUTION INTRAMUSCULAR; INTRAVENOUS at 10:48

## 2023-11-11 RX ADMIN — DEXTROSE SCH UNITS: 5 SOLUTION INTRAVENOUS at 10:53

## 2023-11-11 RX ADMIN — METOPROLOL TARTRATE SCH MG: 25 TABLET, FILM COATED ORAL at 22:00

## 2023-11-11 RX ADMIN — DEXTROSE SCH UNITS: 5 SOLUTION INTRAVENOUS at 22:00

## 2023-11-11 RX ADMIN — FLUCONAZOLE IN SODIUM CHLORIDE SCH MLS/HR: 2 INJECTION, SOLUTION INTRAVENOUS at 11:57

## 2023-11-11 RX ADMIN — ATORVASTATIN CALCIUM SCH MG: 20 TABLET, FILM COATED ORAL at 22:00

## 2023-11-11 RX ADMIN — METOPROLOL TARTRATE SCH MG: 25 TABLET, FILM COATED ORAL at 12:06

## 2023-11-12 VITALS
OXYGEN SATURATION: 94 % | DIASTOLIC BLOOD PRESSURE: 60 MMHG | SYSTOLIC BLOOD PRESSURE: 111 MMHG | HEART RATE: 58 BPM | TEMPERATURE: 98.4 F | RESPIRATION RATE: 21 BRPM

## 2023-11-12 VITALS
HEART RATE: 60 BPM | RESPIRATION RATE: 21 BRPM | TEMPERATURE: 98.7 F | OXYGEN SATURATION: 95 % | DIASTOLIC BLOOD PRESSURE: 72 MMHG | SYSTOLIC BLOOD PRESSURE: 129 MMHG

## 2023-11-12 VITALS
SYSTOLIC BLOOD PRESSURE: 111 MMHG | TEMPERATURE: 98.4 F | OXYGEN SATURATION: 94 % | HEART RATE: 54 BPM | DIASTOLIC BLOOD PRESSURE: 60 MMHG | RESPIRATION RATE: 21 BRPM

## 2023-11-12 VITALS
OXYGEN SATURATION: 93 % | SYSTOLIC BLOOD PRESSURE: 139 MMHG | RESPIRATION RATE: 18 BRPM | DIASTOLIC BLOOD PRESSURE: 51 MMHG | TEMPERATURE: 98.2 F | HEART RATE: 53 BPM

## 2023-11-12 VITALS
TEMPERATURE: 98.3 F | HEART RATE: 63 BPM | RESPIRATION RATE: 21 BRPM | DIASTOLIC BLOOD PRESSURE: 64 MMHG | SYSTOLIC BLOOD PRESSURE: 112 MMHG | OXYGEN SATURATION: 94 %

## 2023-11-12 VITALS — RESPIRATION RATE: 18 BRPM | OXYGEN SATURATION: 98 % | HEART RATE: 58 BPM

## 2023-11-12 VITALS
OXYGEN SATURATION: 93 % | HEART RATE: 54 BPM | SYSTOLIC BLOOD PRESSURE: 125 MMHG | RESPIRATION RATE: 17 BRPM | TEMPERATURE: 98.3 F | DIASTOLIC BLOOD PRESSURE: 40 MMHG

## 2023-11-12 RX ADMIN — HYDROCODONE BITARTRATE AND ACETAMINOPHEN PRN TAB: 5; 325 TABLET ORAL at 23:04

## 2023-11-12 RX ADMIN — FLUCONAZOLE IN SODIUM CHLORIDE SCH MLS/HR: 2 INJECTION, SOLUTION INTRAVENOUS at 09:49

## 2023-11-12 RX ADMIN — MORPHINE SULFATE PRN MG: 2 INJECTION, SOLUTION INTRAMUSCULAR; INTRAVENOUS at 15:55

## 2023-11-12 RX ADMIN — ATORVASTATIN CALCIUM SCH MG: 20 TABLET, FILM COATED ORAL at 20:53

## 2023-11-12 RX ADMIN — HYDROCODONE BITARTRATE AND ACETAMINOPHEN PRN TAB: 10; 325 TABLET ORAL at 03:15

## 2023-11-12 RX ADMIN — BUMETANIDE SCH MG: 1 TABLET ORAL at 17:45

## 2023-11-12 RX ADMIN — MORPHINE SULFATE PRN MG: 2 INJECTION, SOLUTION INTRAMUSCULAR; INTRAVENOUS at 20:48

## 2023-11-12 RX ADMIN — HYDROCODONE BITARTRATE AND ACETAMINOPHEN PRN TAB: 5; 325 TABLET ORAL at 14:05

## 2023-11-12 RX ADMIN — BUMETANIDE SCH MG: 1 TABLET ORAL at 17:46

## 2023-11-12 RX ADMIN — CEFEPIME SCH MLS/HR: 2 INJECTION, POWDER, FOR SOLUTION INTRAMUSCULAR; INTRAVENOUS at 13:59

## 2023-11-12 RX ADMIN — METOPROLOL TARTRATE SCH MG: 25 TABLET, FILM COATED ORAL at 20:54

## 2023-11-12 RX ADMIN — DEXTROSE SCH UNITS: 5 SOLUTION INTRAVENOUS at 21:04

## 2023-11-12 RX ADMIN — ISOSORBIDE MONONITRATE SCH MG: 60 TABLET, EXTENDED RELEASE ORAL at 09:53

## 2023-11-12 RX ADMIN — DEXTROSE SCH UNITS: 5 SOLUTION INTRAVENOUS at 09:56

## 2023-11-12 RX ADMIN — METOPROLOL TARTRATE SCH MG: 25 TABLET, FILM COATED ORAL at 09:53

## 2023-11-13 VITALS
DIASTOLIC BLOOD PRESSURE: 72 MMHG | TEMPERATURE: 98.7 F | RESPIRATION RATE: 18 BRPM | SYSTOLIC BLOOD PRESSURE: 148 MMHG | HEART RATE: 61 BPM | OXYGEN SATURATION: 94 %

## 2023-11-13 VITALS
RESPIRATION RATE: 18 BRPM | HEART RATE: 54 BPM | DIASTOLIC BLOOD PRESSURE: 61 MMHG | TEMPERATURE: 98.7 F | OXYGEN SATURATION: 94 % | SYSTOLIC BLOOD PRESSURE: 132 MMHG

## 2023-11-13 VITALS
OXYGEN SATURATION: 96 % | SYSTOLIC BLOOD PRESSURE: 153 MMHG | RESPIRATION RATE: 18 BRPM | HEART RATE: 60 BPM | DIASTOLIC BLOOD PRESSURE: 66 MMHG | TEMPERATURE: 98.7 F

## 2023-11-13 VITALS — DIASTOLIC BLOOD PRESSURE: 61 MMHG | HEART RATE: 54 BPM | TEMPERATURE: 98.78 F | SYSTOLIC BLOOD PRESSURE: 132 MMHG

## 2023-11-13 VITALS
RESPIRATION RATE: 18 BRPM | DIASTOLIC BLOOD PRESSURE: 61 MMHG | TEMPERATURE: 98.7 F | HEART RATE: 54 BPM | OXYGEN SATURATION: 94 % | SYSTOLIC BLOOD PRESSURE: 132 MMHG

## 2023-11-13 VITALS
OXYGEN SATURATION: 92 % | DIASTOLIC BLOOD PRESSURE: 49 MMHG | TEMPERATURE: 97.4 F | SYSTOLIC BLOOD PRESSURE: 131 MMHG | RESPIRATION RATE: 18 BRPM | HEART RATE: 49 BPM

## 2023-11-13 LAB
ANION GAP SERPL CALCULATED.3IONS-SCNC: 4 MMOL/L (ref 5–15)
BUN SERPL-MCNC: 24 MG/DL (ref 9–23)
BUN/CREAT SERPL: 19.8 (ref 10–20)
CALCIUM SERPL-MCNC: 9.6 MG/DL (ref 8.5–10.1)
CHLORIDE SERPL-SCNC: 109 MMOL/L (ref 98–107)
CO2 SERPL-SCNC: 32 MMOL/L (ref 20–30)
GLUCOSE SERPL-MCNC: 101 MG/DL (ref 74–106)
HCT VFR BLD AUTO: 34.9 % (ref 41–53)
HGB BLD-MCNC: 11.2 G/DL (ref 13.5–17.5)
MCH RBC QN AUTO: 28.9 PG (ref 28–32)
MCV RBC AUTO: 89.9 FL (ref 80–100)
NRBC BLD QL AUTO: 0 %
POTASSIUM SERPL-SCNC: 3.9 MMOL/L (ref 3.5–5.1)
SODIUM SERPL-SCNC: 145 MMOL/L (ref 136–145)

## 2023-11-13 RX ADMIN — CEFEPIME SCH MLS/HR: 2 INJECTION, POWDER, FOR SOLUTION INTRAMUSCULAR; INTRAVENOUS at 01:57

## 2023-11-13 RX ADMIN — MORPHINE SULFATE PRN MG: 2 INJECTION, SOLUTION INTRAMUSCULAR; INTRAVENOUS at 10:18

## 2023-11-13 RX ADMIN — METOPROLOL TARTRATE SCH MG: 25 TABLET, FILM COATED ORAL at 10:00

## 2023-11-13 RX ADMIN — DEXTROSE SCH UNITS: 5 SOLUTION INTRAVENOUS at 10:24

## 2023-11-13 RX ADMIN — MORPHINE SULFATE PRN MG: 2 INJECTION, SOLUTION INTRAMUSCULAR; INTRAVENOUS at 01:57

## 2023-11-13 RX ADMIN — FLUCONAZOLE IN SODIUM CHLORIDE SCH MLS/HR: 2 INJECTION, SOLUTION INTRAVENOUS at 10:14

## 2023-11-13 RX ADMIN — ISOSORBIDE MONONITRATE SCH MG: 60 TABLET, EXTENDED RELEASE ORAL at 10:17

## 2023-11-13 RX ADMIN — MORPHINE SULFATE PRN MG: 2 INJECTION, SOLUTION INTRAMUSCULAR; INTRAVENOUS at 06:11

## 2023-11-13 RX ADMIN — BUMETANIDE SCH MG: 1 TABLET ORAL at 06:09

## 2023-12-09 ENCOUNTER — HOSPITAL ENCOUNTER (INPATIENT)
Dept: HOSPITAL 15 - ER | Age: 68
LOS: 5 days | Discharge: HOME | DRG: 291 | End: 2023-12-14
Attending: INTERNAL MEDICINE | Admitting: INTERNAL MEDICINE
Payer: COMMERCIAL

## 2023-12-09 VITALS — HEART RATE: 68 BPM | RESPIRATION RATE: 17 BRPM | OXYGEN SATURATION: 100 %

## 2023-12-09 VITALS — HEIGHT: 70 IN | BODY MASS INDEX: 45.1 KG/M2 | WEIGHT: 315 LBS

## 2023-12-09 VITALS — OXYGEN SATURATION: 97 %

## 2023-12-09 DIAGNOSIS — Z82.3: ICD-10-CM

## 2023-12-09 DIAGNOSIS — E87.5: ICD-10-CM

## 2023-12-09 DIAGNOSIS — R56.9: ICD-10-CM

## 2023-12-09 DIAGNOSIS — Z86.73: ICD-10-CM

## 2023-12-09 DIAGNOSIS — K21.9: ICD-10-CM

## 2023-12-09 DIAGNOSIS — F17.210: ICD-10-CM

## 2023-12-09 DIAGNOSIS — F32.A: ICD-10-CM

## 2023-12-09 DIAGNOSIS — J44.89: ICD-10-CM

## 2023-12-09 DIAGNOSIS — Z82.49: ICD-10-CM

## 2023-12-09 DIAGNOSIS — D63.1: ICD-10-CM

## 2023-12-09 DIAGNOSIS — Z79.899: ICD-10-CM

## 2023-12-09 DIAGNOSIS — N47.1: ICD-10-CM

## 2023-12-09 DIAGNOSIS — Z83.3: ICD-10-CM

## 2023-12-09 DIAGNOSIS — I50.33: ICD-10-CM

## 2023-12-09 DIAGNOSIS — E83.41: ICD-10-CM

## 2023-12-09 DIAGNOSIS — Z74.01: ICD-10-CM

## 2023-12-09 DIAGNOSIS — G47.33: ICD-10-CM

## 2023-12-09 DIAGNOSIS — E11.22: ICD-10-CM

## 2023-12-09 DIAGNOSIS — R62.7: ICD-10-CM

## 2023-12-09 DIAGNOSIS — Z87.440: ICD-10-CM

## 2023-12-09 DIAGNOSIS — J96.21: ICD-10-CM

## 2023-12-09 DIAGNOSIS — F41.9: ICD-10-CM

## 2023-12-09 DIAGNOSIS — Z82.5: ICD-10-CM

## 2023-12-09 DIAGNOSIS — I25.10: ICD-10-CM

## 2023-12-09 DIAGNOSIS — I25.2: ICD-10-CM

## 2023-12-09 DIAGNOSIS — E78.5: ICD-10-CM

## 2023-12-09 DIAGNOSIS — I95.9: ICD-10-CM

## 2023-12-09 DIAGNOSIS — I13.0: Primary | ICD-10-CM

## 2023-12-09 DIAGNOSIS — E66.01: ICD-10-CM

## 2023-12-09 DIAGNOSIS — N17.9: ICD-10-CM

## 2023-12-09 DIAGNOSIS — Z81.8: ICD-10-CM

## 2023-12-09 DIAGNOSIS — Z90.49: ICD-10-CM

## 2023-12-09 DIAGNOSIS — N18.31: ICD-10-CM

## 2023-12-09 LAB
ALBUMIN SERPL-MCNC: 4.1 G/DL (ref 3.2–4.8)
ALP SERPL-CCNC: 76 U/L (ref 46–116)
ALT SERPL-CCNC: 22 U/L (ref 7–40)
ANION GAP SERPL CALCULATED.3IONS-SCNC: 7 MMOL/L (ref 5–15)
APAP SERPL-MCNC: < 2 UG/ML (ref 10–20)
BASE EXCESS BLDV CALC-SCNC: 2.5 MMOL/L (ref -2–2)
BILIRUB SERPL-MCNC: 0.5 MG/DL (ref 0.2–1)
BUN SERPL-MCNC: 40 MG/DL (ref 9–23)
BUN/CREAT SERPL: 26 (ref 10–20)
CALCIUM SERPL-MCNC: 9.5 MG/DL (ref 8.7–10.4)
CHLORIDE SERPL-SCNC: 105 MMOL/L (ref 98–107)
CO2 SERPL-SCNC: 30 MMOL/L (ref 20–30)
GLUCOSE SERPL-MCNC: 98 MG/DL (ref 74–106)
HCT VFR BLD AUTO: 39.3 % (ref 41–53)
HGB BLD-MCNC: 12.6 G/DL (ref 13.5–17.5)
INR PPP: 1.03 (ref 0.9–1.15)
LIPASE SERPL-CCNC: 48 U/L (ref 12–53)
MCH RBC QN AUTO: 29.1 PG (ref 28–32)
MCV RBC AUTO: 91.1 FL (ref 80–100)
NRBC BLD QL AUTO: 0.1 %
POTASSIUM SERPL-SCNC: 4.3 MMOL/L (ref 3.5–5.1)
PROT SERPL-MCNC: 7.3 G/DL (ref 5.7–8.2)
PROTHROMBIN TIME: 10.8 SEC (ref 9.3–11.8)
SALICYLATES SERPL-MCNC: < 3 MG/DL (ref 2.8–20)
SODIUM SERPL-SCNC: 142 MMOL/L (ref 136–145)

## 2023-12-09 PROCEDURE — 85610 PROTHROMBIN TIME: CPT

## 2023-12-09 PROCEDURE — 36600 WITHDRAWAL OF ARTERIAL BLOOD: CPT

## 2023-12-09 PROCEDURE — 80053 COMPREHEN METABOLIC PANEL: CPT

## 2023-12-09 PROCEDURE — 81001 URINALYSIS AUTO W/SCOPE: CPT

## 2023-12-09 PROCEDURE — 84132 ASSAY OF SERUM POTASSIUM: CPT

## 2023-12-09 PROCEDURE — 87081 CULTURE SCREEN ONLY: CPT

## 2023-12-09 PROCEDURE — 97116 GAIT TRAINING THERAPY: CPT

## 2023-12-09 PROCEDURE — 96375 TX/PRO/DX INJ NEW DRUG ADDON: CPT

## 2023-12-09 PROCEDURE — 84100 ASSAY OF PHOSPHORUS: CPT

## 2023-12-09 PROCEDURE — 80061 LIPID PANEL: CPT

## 2023-12-09 PROCEDURE — 82607 VITAMIN B-12: CPT

## 2023-12-09 PROCEDURE — 70450 CT HEAD/BRAIN W/O DYE: CPT

## 2023-12-09 PROCEDURE — 80329 ANALGESICS NON-OPIOID 1 OR 2: CPT

## 2023-12-09 PROCEDURE — 83970 ASSAY OF PARATHORMONE: CPT

## 2023-12-09 PROCEDURE — 76775 US EXAM ABDO BACK WALL LIM: CPT

## 2023-12-09 PROCEDURE — 83540 ASSAY OF IRON: CPT

## 2023-12-09 PROCEDURE — 36415 COLL VENOUS BLD VENIPUNCTURE: CPT

## 2023-12-09 PROCEDURE — 96366 THER/PROPH/DIAG IV INF ADDON: CPT

## 2023-12-09 PROCEDURE — 97163 PT EVAL HIGH COMPLEX 45 MIN: CPT

## 2023-12-09 PROCEDURE — 97530 THERAPEUTIC ACTIVITIES: CPT

## 2023-12-09 PROCEDURE — 71045 X-RAY EXAM CHEST 1 VIEW: CPT

## 2023-12-09 PROCEDURE — 84484 ASSAY OF TROPONIN QUANT: CPT

## 2023-12-09 PROCEDURE — 80048 BASIC METABOLIC PNL TOTAL CA: CPT

## 2023-12-09 PROCEDURE — 83690 ASSAY OF LIPASE: CPT

## 2023-12-09 PROCEDURE — 86592 SYPHILIS TEST NON-TREP QUAL: CPT

## 2023-12-09 PROCEDURE — 99291 CRITICAL CARE FIRST HOUR: CPT

## 2023-12-09 PROCEDURE — 85025 COMPLETE CBC W/AUTO DIFF WBC: CPT

## 2023-12-09 PROCEDURE — 82570 ASSAY OF URINE CREATININE: CPT

## 2023-12-09 PROCEDURE — 82805 BLOOD GASES W/O2 SATURATION: CPT

## 2023-12-09 PROCEDURE — 97110 THERAPEUTIC EXERCISES: CPT

## 2023-12-09 PROCEDURE — 82746 ASSAY OF FOLIC ACID SERUM: CPT

## 2023-12-09 PROCEDURE — 83735 ASSAY OF MAGNESIUM: CPT

## 2023-12-09 PROCEDURE — 84156 ASSAY OF PROTEIN URINE: CPT

## 2023-12-09 PROCEDURE — 96365 THER/PROPH/DIAG IV INF INIT: CPT

## 2023-12-09 PROCEDURE — 93005 ELECTROCARDIOGRAM TRACING: CPT

## 2023-12-09 PROCEDURE — 83880 ASSAY OF NATRIURETIC PEPTIDE: CPT

## 2023-12-09 PROCEDURE — 84300 ASSAY OF URINE SODIUM: CPT

## 2023-12-09 PROCEDURE — 83550 IRON BINDING TEST: CPT

## 2023-12-09 PROCEDURE — 84443 ASSAY THYROID STIM HORMONE: CPT

## 2023-12-09 PROCEDURE — 80320 DRUG SCREEN QUANTALCOHOLS: CPT

## 2023-12-09 PROCEDURE — 82306 VITAMIN D 25 HYDROXY: CPT

## 2023-12-09 RX ADMIN — ATORVASTATIN CALCIUM SCH MG: 20 TABLET, FILM COATED ORAL at 22:53

## 2023-12-09 RX ADMIN — RANOLAZINE SCH MG: 500 TABLET, FILM COATED, EXTENDED RELEASE ORAL at 22:53

## 2023-12-09 RX ADMIN — ONDANSETRON HYDROCHLORIDE PRN MG: 2 INJECTION, SOLUTION INTRAMUSCULAR; INTRAVENOUS at 21:36

## 2023-12-09 RX ADMIN — MORPHINE SULFATE PRN MG: 2 INJECTION, SOLUTION INTRAMUSCULAR; INTRAVENOUS at 21:40

## 2023-12-10 VITALS
OXYGEN SATURATION: 95 % | TEMPERATURE: 97.8 F | SYSTOLIC BLOOD PRESSURE: 136 MMHG | RESPIRATION RATE: 20 BRPM | DIASTOLIC BLOOD PRESSURE: 56 MMHG | HEART RATE: 20 BPM

## 2023-12-10 VITALS
SYSTOLIC BLOOD PRESSURE: 117 MMHG | OXYGEN SATURATION: 90 % | DIASTOLIC BLOOD PRESSURE: 54 MMHG | RESPIRATION RATE: 19 BRPM | TEMPERATURE: 98.2 F | HEART RATE: 60 BPM

## 2023-12-10 VITALS
HEART RATE: 56 BPM | RESPIRATION RATE: 20 BRPM | TEMPERATURE: 97.8 F | SYSTOLIC BLOOD PRESSURE: 117 MMHG | OXYGEN SATURATION: 95 % | DIASTOLIC BLOOD PRESSURE: 51 MMHG

## 2023-12-10 VITALS
HEART RATE: 64 BPM | OXYGEN SATURATION: 95 % | DIASTOLIC BLOOD PRESSURE: 56 MMHG | SYSTOLIC BLOOD PRESSURE: 136 MMHG | TEMPERATURE: 97.8 F | RESPIRATION RATE: 20 BRPM

## 2023-12-10 VITALS
DIASTOLIC BLOOD PRESSURE: 72 MMHG | SYSTOLIC BLOOD PRESSURE: 141 MMHG | TEMPERATURE: 97.7 F | OXYGEN SATURATION: 95 % | RESPIRATION RATE: 16 BRPM | HEART RATE: 62 BPM

## 2023-12-10 VITALS
TEMPERATURE: 97.8 F | HEART RATE: 60 BPM | RESPIRATION RATE: 21 BRPM | OXYGEN SATURATION: 91 % | DIASTOLIC BLOOD PRESSURE: 51 MMHG | SYSTOLIC BLOOD PRESSURE: 124 MMHG

## 2023-12-10 VITALS
SYSTOLIC BLOOD PRESSURE: 117 MMHG | HEART RATE: 62 BPM | DIASTOLIC BLOOD PRESSURE: 60 MMHG | TEMPERATURE: 97.5 F | RESPIRATION RATE: 22 BRPM | OXYGEN SATURATION: 90 %

## 2023-12-10 VITALS
SYSTOLIC BLOOD PRESSURE: 105 MMHG | DIASTOLIC BLOOD PRESSURE: 45 MMHG | RESPIRATION RATE: 19 BRPM | HEART RATE: 61 BPM | TEMPERATURE: 97.8 F | OXYGEN SATURATION: 99 %

## 2023-12-10 LAB
ALBUMIN SERPL-MCNC: 3.7 G/DL (ref 3.2–4.8)
ALP SERPL-CCNC: 61 U/L (ref 46–116)
ALT SERPL-CCNC: 19 U/L (ref 7–40)
ANION GAP SERPL CALCULATED.3IONS-SCNC: 7 MMOL/L (ref 5–15)
BILIRUB SERPL-MCNC: 0.4 MG/DL (ref 0.2–1)
BUN SERPL-MCNC: 36 MG/DL (ref 9–23)
BUN/CREAT SERPL: 20.5 (ref 10–20)
CALCIUM SERPL-MCNC: 9.4 MG/DL (ref 8.5–10.1)
CHLORIDE SERPL-SCNC: 106 MMOL/L (ref 98–107)
CHOLEST SERPL-MCNC: 119 MG/DL (ref ?–200)
CO2 SERPL-SCNC: 29 MMOL/L (ref 20–30)
GLUCOSE SERPL-MCNC: 97 MG/DL (ref 74–106)
HCT VFR BLD AUTO: 34.9 % (ref 41–53)
HDLC SERPL-MCNC: 38 MG/DL (ref 40–59)
HGB BLD-MCNC: 11.3 G/DL (ref 13.5–17.5)
MCH RBC QN AUTO: 28.9 PG (ref 28–32)
MCV RBC AUTO: 89 FL (ref 80–100)
NRBC BLD QL AUTO: 0.1 %
POTASSIUM SERPL-SCNC: 4.6 MMOL/L (ref 3.5–5.1)
PROT SERPL-MCNC: 6.7 G/DL (ref 5.7–8.2)
SODIUM SERPL-SCNC: 142 MMOL/L (ref 136–145)
TRIGL SERPL-MCNC: 37 MG/DL (ref ?–150)

## 2023-12-10 RX ADMIN — SODIUM CHLORIDE SCH MG: 9 INJECTION, SOLUTION INTRAVENOUS at 05:55

## 2023-12-10 RX ADMIN — MORPHINE SULFATE PRN MG: 2 INJECTION, SOLUTION INTRAMUSCULAR; INTRAVENOUS at 11:12

## 2023-12-10 RX ADMIN — GABAPENTIN SCH MG: 400 CAPSULE ORAL at 22:36

## 2023-12-10 RX ADMIN — ATORVASTATIN CALCIUM SCH MG: 20 TABLET, FILM COATED ORAL at 22:35

## 2023-12-10 RX ADMIN — GABAPENTIN SCH MG: 400 CAPSULE ORAL at 11:13

## 2023-12-10 RX ADMIN — ENOXAPARIN SODIUM SCH MG: 40 INJECTION SUBCUTANEOUS at 11:14

## 2023-12-10 RX ADMIN — MORPHINE SULFATE PRN MG: 2 INJECTION, SOLUTION INTRAMUSCULAR; INTRAVENOUS at 03:47

## 2023-12-10 RX ADMIN — ONDANSETRON HYDROCHLORIDE PRN MG: 2 INJECTION, SOLUTION INTRAMUSCULAR; INTRAVENOUS at 11:13

## 2023-12-10 RX ADMIN — ALBUTEROL SULFATE SCH TAB: 90 AEROSOL, METERED RESPIRATORY (INHALATION) at 10:00

## 2023-12-10 RX ADMIN — MORPHINE SULFATE PRN MG: 2 INJECTION, SOLUTION INTRAMUSCULAR; INTRAVENOUS at 17:57

## 2023-12-10 RX ADMIN — SODIUM CHLORIDE SCH MG: 9 INJECTION, SOLUTION INTRAVENOUS at 19:08

## 2023-12-10 RX ADMIN — RANOLAZINE SCH MG: 500 TABLET, FILM COATED, EXTENDED RELEASE ORAL at 22:37

## 2023-12-10 RX ADMIN — BENAZEPRIL HYDROCHLORIDE SCH MG: 10 TABLET, FILM COATED ORAL at 10:00

## 2023-12-10 RX ADMIN — ISOSORBIDE MONONITRATE SCH MG: 60 TABLET, EXTENDED RELEASE ORAL at 10:00

## 2023-12-10 RX ADMIN — RANOLAZINE SCH MG: 500 TABLET, FILM COATED, EXTENDED RELEASE ORAL at 13:42

## 2023-12-10 RX ADMIN — ASPIRIN SCH MG: 81 TABLET ORAL at 10:00

## 2023-12-11 VITALS — HEART RATE: 57 BPM | RESPIRATION RATE: 20 BRPM

## 2023-12-11 VITALS
RESPIRATION RATE: 22 BRPM | TEMPERATURE: 97.5 F | DIASTOLIC BLOOD PRESSURE: 49 MMHG | OXYGEN SATURATION: 94 % | HEART RATE: 55 BPM | SYSTOLIC BLOOD PRESSURE: 94 MMHG

## 2023-12-11 VITALS
SYSTOLIC BLOOD PRESSURE: 119 MMHG | HEART RATE: 61 BPM | OXYGEN SATURATION: 90 % | TEMPERATURE: 97.8 F | DIASTOLIC BLOOD PRESSURE: 63 MMHG | RESPIRATION RATE: 21 BRPM

## 2023-12-11 VITALS
DIASTOLIC BLOOD PRESSURE: 36 MMHG | HEART RATE: 54 BPM | SYSTOLIC BLOOD PRESSURE: 96 MMHG | RESPIRATION RATE: 22 BRPM | TEMPERATURE: 97.7 F | OXYGEN SATURATION: 94 %

## 2023-12-11 VITALS
OXYGEN SATURATION: 91 % | TEMPERATURE: 97.8 F | SYSTOLIC BLOOD PRESSURE: 98 MMHG | DIASTOLIC BLOOD PRESSURE: 54 MMHG | RESPIRATION RATE: 16 BRPM | HEART RATE: 59 BPM

## 2023-12-11 VITALS
RESPIRATION RATE: 16 BRPM | SYSTOLIC BLOOD PRESSURE: 107 MMHG | HEART RATE: 61 BPM | OXYGEN SATURATION: 91 % | DIASTOLIC BLOOD PRESSURE: 45 MMHG | TEMPERATURE: 97.6 F

## 2023-12-11 LAB
ALBUMIN SERPL-MCNC: 3.6 G/DL (ref 3.2–4.8)
ALP SERPL-CCNC: 63 U/L (ref 46–116)
ALT SERPL-CCNC: 19 U/L (ref 7–40)
ANION GAP SERPL CALCULATED.3IONS-SCNC: 6 MMOL/L (ref 5–15)
BILIRUB SERPL-MCNC: 0.4 MG/DL (ref 0.2–1)
BUN SERPL-MCNC: 34 MG/DL (ref 9–23)
BUN/CREAT SERPL: 18.8 (ref 10–20)
CALCIUM SERPL-MCNC: 9.2 MG/DL (ref 8.5–10.1)
CHLORIDE SERPL-SCNC: 105 MMOL/L (ref 98–107)
CO2 SERPL-SCNC: 30 MMOL/L (ref 20–30)
GLUCOSE SERPL-MCNC: 92 MG/DL (ref 74–106)
HCT VFR BLD AUTO: 36.5 % (ref 41–53)
HGB BLD-MCNC: 11.7 G/DL (ref 13.5–17.5)
IRON SERPL-MCNC: 61 UG/DL (ref 65–175)
MAGNESIUM SERPL-MCNC: 2.3 MG/DL (ref 1.6–2.6)
MCH RBC QN AUTO: 29 PG (ref 28–32)
MCV RBC AUTO: 90.3 FL (ref 80–100)
NRBC BLD QL AUTO: 0 %
POTASSIUM SERPL-SCNC: 4.5 MMOL/L (ref 3.5–5.1)
PROT SERPL-MCNC: 6.6 G/DL (ref 5.7–8.2)
SODIUM SERPL-SCNC: 141 MMOL/L (ref 136–145)
TIBC SERPL-MCNC: 268 UG/DL (ref 250–425)

## 2023-12-11 RX ADMIN — BENAZEPRIL HYDROCHLORIDE SCH MG: 10 TABLET, FILM COATED ORAL at 08:48

## 2023-12-11 RX ADMIN — MORPHINE SULFATE PRN MG: 2 INJECTION, SOLUTION INTRAMUSCULAR; INTRAVENOUS at 08:48

## 2023-12-11 RX ADMIN — ISOSORBIDE MONONITRATE SCH MG: 60 TABLET, EXTENDED RELEASE ORAL at 08:47

## 2023-12-11 RX ADMIN — HYDROCODONE BITARTRATE AND ACETAMINOPHEN PRN TAB: 10; 325 TABLET ORAL at 10:57

## 2023-12-11 RX ADMIN — MORPHINE SULFATE PRN MG: 2 INJECTION, SOLUTION INTRAMUSCULAR; INTRAVENOUS at 00:41

## 2023-12-11 RX ADMIN — SODIUM CHLORIDE SCH MG: 9 INJECTION, SOLUTION INTRAVENOUS at 05:34

## 2023-12-11 RX ADMIN — RANOLAZINE SCH MG: 500 TABLET, FILM COATED, EXTENDED RELEASE ORAL at 08:47

## 2023-12-11 RX ADMIN — ASPIRIN SCH MG: 81 TABLET ORAL at 08:46

## 2023-12-11 RX ADMIN — GABAPENTIN SCH MG: 400 CAPSULE ORAL at 23:02

## 2023-12-11 RX ADMIN — HYDROCODONE BITARTRATE AND ACETAMINOPHEN PRN TAB: 10; 325 TABLET ORAL at 16:06

## 2023-12-11 RX ADMIN — HYDROCODONE BITARTRATE AND ACETAMINOPHEN PRN TAB: 10; 325 TABLET ORAL at 20:40

## 2023-12-11 RX ADMIN — ENOXAPARIN SODIUM SCH MG: 40 INJECTION SUBCUTANEOUS at 08:46

## 2023-12-11 RX ADMIN — GABAPENTIN SCH MG: 400 CAPSULE ORAL at 08:47

## 2023-12-11 RX ADMIN — ATORVASTATIN CALCIUM SCH MG: 20 TABLET, FILM COATED ORAL at 23:02

## 2023-12-11 RX ADMIN — SODIUM CHLORIDE SCH MG: 9 INJECTION, SOLUTION INTRAVENOUS at 17:14

## 2023-12-11 RX ADMIN — RANOLAZINE SCH MG: 500 TABLET, FILM COATED, EXTENDED RELEASE ORAL at 23:03

## 2023-12-11 RX ADMIN — MORPHINE SULFATE PRN MG: 2 INJECTION, SOLUTION INTRAMUSCULAR; INTRAVENOUS at 23:42

## 2023-12-11 RX ADMIN — ALBUTEROL SULFATE SCH TAB: 90 AEROSOL, METERED RESPIRATORY (INHALATION) at 08:48

## 2023-12-12 VITALS — HEART RATE: 56 BPM

## 2023-12-12 VITALS
HEART RATE: 55 BPM | SYSTOLIC BLOOD PRESSURE: 105 MMHG | RESPIRATION RATE: 16 BRPM | TEMPERATURE: 97.1 F | OXYGEN SATURATION: 91 % | DIASTOLIC BLOOD PRESSURE: 49 MMHG

## 2023-12-12 VITALS
DIASTOLIC BLOOD PRESSURE: 42 MMHG | RESPIRATION RATE: 19 BRPM | OXYGEN SATURATION: 96 % | SYSTOLIC BLOOD PRESSURE: 99 MMHG | HEART RATE: 81 BPM | TEMPERATURE: 97.9 F

## 2023-12-12 VITALS
TEMPERATURE: 97.8 F | DIASTOLIC BLOOD PRESSURE: 46 MMHG | HEART RATE: 66 BPM | OXYGEN SATURATION: 94 % | SYSTOLIC BLOOD PRESSURE: 106 MMHG | RESPIRATION RATE: 18 BRPM

## 2023-12-12 VITALS
SYSTOLIC BLOOD PRESSURE: 111 MMHG | DIASTOLIC BLOOD PRESSURE: 58 MMHG | RESPIRATION RATE: 16 BRPM | TEMPERATURE: 98 F | OXYGEN SATURATION: 92 % | HEART RATE: 59 BPM

## 2023-12-12 VITALS
SYSTOLIC BLOOD PRESSURE: 99 MMHG | DIASTOLIC BLOOD PRESSURE: 43 MMHG | OXYGEN SATURATION: 91 % | RESPIRATION RATE: 19 BRPM | HEART RATE: 57 BPM | TEMPERATURE: 98.3 F

## 2023-12-12 VITALS
DIASTOLIC BLOOD PRESSURE: 46 MMHG | TEMPERATURE: 97.8 F | RESPIRATION RATE: 18 BRPM | SYSTOLIC BLOOD PRESSURE: 106 MMHG | HEART RATE: 66 BPM

## 2023-12-12 LAB
ANION GAP SERPL CALCULATED.3IONS-SCNC: 5 MMOL/L (ref 5–15)
BUN SERPL-MCNC: 33 MG/DL (ref 9–23)
BUN/CREAT SERPL: 13.4 (ref 10–20)
CALCIUM SERPL-MCNC: 9.2 MG/DL (ref 8.7–10.4)
CHLORIDE SERPL-SCNC: 103 MMOL/L (ref 98–107)
CO2 SERPL-SCNC: 29 MMOL/L (ref 20–30)
GLUCOSE SERPL-MCNC: 83 MG/DL (ref 74–106)
HCT VFR BLD AUTO: 36.6 % (ref 41–53)
HGB BLD-MCNC: 11.5 G/DL (ref 13.5–17.5)
MAGNESIUM SERPL-MCNC: 2.4 MG/DL (ref 1.6–2.6)
MCH RBC QN AUTO: 28.8 PG (ref 28–32)
MCV RBC AUTO: 91.5 FL (ref 80–100)
NRBC BLD QL AUTO: 0.1 %
POTASSIUM SERPL-SCNC: 4.6 MMOL/L (ref 3.5–5.1)
PROT UR-MCNC: < 6 MG/DL (ref 0–11.9)
SODIUM SERPL-SCNC: 137 MMOL/L (ref 136–145)

## 2023-12-12 RX ADMIN — ATORVASTATIN CALCIUM SCH MG: 20 TABLET, FILM COATED ORAL at 22:33

## 2023-12-12 RX ADMIN — RANOLAZINE SCH MG: 500 TABLET, FILM COATED, EXTENDED RELEASE ORAL at 08:28

## 2023-12-12 RX ADMIN — DOPAMINE HYDROCHLORIDE IN DEXTROSE SCH MLS/HR: 1.6 INJECTION, SOLUTION INTRAVENOUS at 12:54

## 2023-12-12 RX ADMIN — GABAPENTIN SCH MG: 400 CAPSULE ORAL at 08:28

## 2023-12-12 RX ADMIN — ASPIRIN SCH MG: 81 TABLET ORAL at 08:27

## 2023-12-12 RX ADMIN — MORPHINE SULFATE PRN MG: 2 INJECTION, SOLUTION INTRAMUSCULAR; INTRAVENOUS at 08:27

## 2023-12-12 RX ADMIN — SODIUM CHLORIDE SCH MCG: 9 INJECTION, SOLUTION INTRAVENOUS at 22:32

## 2023-12-12 RX ADMIN — GABAPENTIN SCH MG: 400 CAPSULE ORAL at 22:30

## 2023-12-12 RX ADMIN — SODIUM CHLORIDE SCH MCG: 9 INJECTION, SOLUTION INTRAVENOUS at 15:34

## 2023-12-12 RX ADMIN — ALBUTEROL SULFATE SCH TAB: 90 AEROSOL, METERED RESPIRATORY (INHALATION) at 11:00

## 2023-12-12 RX ADMIN — HYDROCODONE BITARTRATE AND ACETAMINOPHEN PRN TAB: 10; 325 TABLET ORAL at 13:15

## 2023-12-12 RX ADMIN — ENOXAPARIN SODIUM SCH MG: 40 INJECTION SUBCUTANEOUS at 08:27

## 2023-12-12 RX ADMIN — SODIUM CHLORIDE SCH MG: 9 INJECTION, SOLUTION INTRAVENOUS at 06:48

## 2023-12-12 RX ADMIN — BENAZEPRIL HYDROCHLORIDE SCH MG: 10 TABLET, FILM COATED ORAL at 11:00

## 2023-12-12 RX ADMIN — HYDROCODONE BITARTRATE AND ACETAMINOPHEN PRN TAB: 10; 325 TABLET ORAL at 02:39

## 2023-12-12 RX ADMIN — ISOSORBIDE MONONITRATE SCH MG: 60 TABLET, EXTENDED RELEASE ORAL at 08:27

## 2023-12-12 RX ADMIN — RANOLAZINE SCH MG: 500 TABLET, FILM COATED, EXTENDED RELEASE ORAL at 22:33

## 2023-12-13 VITALS
DIASTOLIC BLOOD PRESSURE: 59 MMHG | RESPIRATION RATE: 20 BRPM | HEART RATE: 62 BPM | SYSTOLIC BLOOD PRESSURE: 121 MMHG | OXYGEN SATURATION: 90 % | TEMPERATURE: 98.2 F

## 2023-12-13 VITALS
HEART RATE: 56 BPM | TEMPERATURE: 97.5 F | SYSTOLIC BLOOD PRESSURE: 127 MMHG | OXYGEN SATURATION: 93 % | DIASTOLIC BLOOD PRESSURE: 56 MMHG | RESPIRATION RATE: 16 BRPM

## 2023-12-13 VITALS — HEART RATE: 63 BPM

## 2023-12-13 VITALS
RESPIRATION RATE: 19 BRPM | SYSTOLIC BLOOD PRESSURE: 108 MMHG | DIASTOLIC BLOOD PRESSURE: 48 MMHG | HEART RATE: 63 BPM | OXYGEN SATURATION: 93 % | TEMPERATURE: 97.6 F

## 2023-12-13 VITALS
RESPIRATION RATE: 19 BRPM | SYSTOLIC BLOOD PRESSURE: 126 MMHG | DIASTOLIC BLOOD PRESSURE: 53 MMHG | OXYGEN SATURATION: 91 % | HEART RATE: 63 BPM | TEMPERATURE: 97.8 F

## 2023-12-13 VITALS
RESPIRATION RATE: 19 BRPM | HEART RATE: 63 BPM | DIASTOLIC BLOOD PRESSURE: 57 MMHG | OXYGEN SATURATION: 97 % | SYSTOLIC BLOOD PRESSURE: 135 MMHG | TEMPERATURE: 98.4 F

## 2023-12-13 VITALS — HEART RATE: 64 BPM

## 2023-12-13 LAB
ALBUMIN SERPL-MCNC: 4.1 G/DL (ref 3.2–4.8)
ALP SERPL-CCNC: 67 U/L (ref 46–116)
ALT SERPL-CCNC: 20 U/L (ref 7–40)
ANION GAP SERPL CALCULATED.3IONS-SCNC: 7 MMOL/L (ref 5–15)
BILIRUB SERPL-MCNC: 0.5 MG/DL (ref 0.2–1)
BUN SERPL-MCNC: 31 MG/DL (ref 9–23)
BUN/CREAT SERPL: 11.3 (ref 10–20)
CALCIUM SERPL-MCNC: 9.4 MG/DL (ref 8.7–10.4)
CHLORIDE SERPL-SCNC: 101 MMOL/L (ref 98–107)
CO2 SERPL-SCNC: 26 MMOL/L (ref 20–30)
GLUCOSE SERPL-MCNC: 125 MG/DL (ref 74–106)
HCT VFR BLD AUTO: 39.3 % (ref 41–53)
HGB BLD-MCNC: 12.4 G/DL (ref 13.5–17.5)
MAGNESIUM SERPL-MCNC: 2.8 MG/DL (ref 1.6–2.6)
MCH RBC QN AUTO: 29.3 PG (ref 28–32)
MCV RBC AUTO: 92.7 FL (ref 80–100)
NRBC BLD QL AUTO: 0.1 %
POTASSIUM SERPL-SCNC: 5.4 MMOL/L (ref 3.5–5.1)
PROT SERPL-MCNC: 7.6 G/DL (ref 5.7–8.2)
PROT UR STRIP.AUTO-MCNC: (no result) MG/DL
SODIUM SERPL-SCNC: 134 MMOL/L (ref 136–145)

## 2023-12-13 RX ADMIN — ISOSORBIDE MONONITRATE SCH MG: 60 TABLET, EXTENDED RELEASE ORAL at 09:50

## 2023-12-13 RX ADMIN — MORPHINE SULFATE PRN MG: 2 INJECTION, SOLUTION INTRAMUSCULAR; INTRAVENOUS at 10:47

## 2023-12-13 RX ADMIN — ENOXAPARIN SODIUM SCH MG: 40 INJECTION SUBCUTANEOUS at 09:48

## 2023-12-13 RX ADMIN — MORPHINE SULFATE PRN MG: 2 INJECTION, SOLUTION INTRAMUSCULAR; INTRAVENOUS at 00:48

## 2023-12-13 RX ADMIN — SODIUM ZIRCONIUM CYCLOSILICATE SCH GM: 5 POWDER, FOR SUSPENSION ORAL at 14:57

## 2023-12-13 RX ADMIN — RANOLAZINE SCH MG: 500 TABLET, FILM COATED, EXTENDED RELEASE ORAL at 21:42

## 2023-12-13 RX ADMIN — ASPIRIN SCH MG: 81 TABLET ORAL at 09:49

## 2023-12-13 RX ADMIN — MORPHINE SULFATE PRN MG: 2 INJECTION, SOLUTION INTRAMUSCULAR; INTRAVENOUS at 19:57

## 2023-12-13 RX ADMIN — GABAPENTIN SCH MG: 400 CAPSULE ORAL at 09:49

## 2023-12-13 RX ADMIN — ALBUTEROL SULFATE SCH TAB: 90 AEROSOL, METERED RESPIRATORY (INHALATION) at 09:50

## 2023-12-13 RX ADMIN — ATORVASTATIN CALCIUM SCH MG: 20 TABLET, FILM COATED ORAL at 21:41

## 2023-12-13 RX ADMIN — DOPAMINE HYDROCHLORIDE IN DEXTROSE SCH MLS/HR: 1.6 INJECTION, SOLUTION INTRAVENOUS at 06:16

## 2023-12-13 RX ADMIN — SODIUM CHLORIDE SCH MCG: 9 INJECTION, SOLUTION INTRAVENOUS at 21:38

## 2023-12-13 RX ADMIN — RANOLAZINE SCH MG: 500 TABLET, FILM COATED, EXTENDED RELEASE ORAL at 09:50

## 2023-12-13 RX ADMIN — SODIUM CHLORIDE SCH MCG: 9 INJECTION, SOLUTION INTRAVENOUS at 14:58

## 2023-12-13 RX ADMIN — GABAPENTIN SCH MG: 400 CAPSULE ORAL at 21:42

## 2023-12-13 RX ADMIN — SODIUM CHLORIDE SCH MCG: 9 INJECTION, SOLUTION INTRAVENOUS at 06:13

## 2023-12-13 RX ADMIN — SODIUM ZIRCONIUM CYCLOSILICATE SCH GM: 5 POWDER, FOR SUSPENSION ORAL at 09:47

## 2023-12-14 VITALS
RESPIRATION RATE: 18 BRPM | SYSTOLIC BLOOD PRESSURE: 122 MMHG | DIASTOLIC BLOOD PRESSURE: 72 MMHG | TEMPERATURE: 98.1 F | HEART RATE: 51 BPM | OXYGEN SATURATION: 96 %

## 2023-12-14 VITALS
OXYGEN SATURATION: 95 % | HEART RATE: 45 BPM | TEMPERATURE: 98.8 F | DIASTOLIC BLOOD PRESSURE: 57 MMHG | RESPIRATION RATE: 18 BRPM | SYSTOLIC BLOOD PRESSURE: 113 MMHG

## 2023-12-14 VITALS
HEART RATE: 51 BPM | TEMPERATURE: 98.1 F | RESPIRATION RATE: 18 BRPM | SYSTOLIC BLOOD PRESSURE: 104 MMHG | DIASTOLIC BLOOD PRESSURE: 58 MMHG

## 2023-12-14 VITALS — HEART RATE: 55 BPM

## 2023-12-14 VITALS
RESPIRATION RATE: 18 BRPM | OXYGEN SATURATION: 95 % | HEART RATE: 55 BPM | SYSTOLIC BLOOD PRESSURE: 113 MMHG | TEMPERATURE: 98.8 F | DIASTOLIC BLOOD PRESSURE: 57 MMHG

## 2023-12-14 LAB
ANION GAP SERPL CALCULATED.3IONS-SCNC: 4 MMOL/L (ref 5–15)
BUN SERPL-MCNC: 30 MG/DL (ref 9–23)
BUN/CREAT SERPL: 14.9 (ref 10–20)
CALCIUM SERPL-MCNC: 9 MG/DL (ref 8.7–10.4)
CHLORIDE SERPL-SCNC: 104 MMOL/L (ref 98–107)
CO2 SERPL-SCNC: 28 MMOL/L (ref 20–30)
GLUCOSE SERPL-MCNC: 115 MG/DL (ref 74–106)
HCT VFR BLD AUTO: 38.9 % (ref 41–53)
HGB BLD-MCNC: 12.4 G/DL (ref 13.5–17.5)
MAGNESIUM SERPL-MCNC: 2.8 MG/DL (ref 1.6–2.6)
MCH RBC QN AUTO: 29.2 PG (ref 28–32)
MCV RBC AUTO: 91.6 FL (ref 80–100)
NRBC BLD QL AUTO: 0 %
POTASSIUM SERPL-SCNC: 4.8 MMOL/L (ref 3.5–5.1)
SODIUM SERPL-SCNC: 136 MMOL/L (ref 136–145)

## 2023-12-14 RX ADMIN — SODIUM CHLORIDE SCH MCG: 9 INJECTION, SOLUTION INTRAVENOUS at 06:15

## 2023-12-14 RX ADMIN — HYDROCODONE BITARTRATE AND ACETAMINOPHEN PRN TAB: 10; 325 TABLET ORAL at 16:51

## 2023-12-14 RX ADMIN — GABAPENTIN SCH MG: 400 CAPSULE ORAL at 09:57

## 2023-12-14 RX ADMIN — ENOXAPARIN SODIUM SCH MG: 40 INJECTION SUBCUTANEOUS at 09:59

## 2023-12-14 RX ADMIN — ISOSORBIDE MONONITRATE SCH MG: 60 TABLET, EXTENDED RELEASE ORAL at 09:59

## 2023-12-14 RX ADMIN — ASPIRIN SCH MG: 81 TABLET ORAL at 09:58

## 2023-12-14 RX ADMIN — MORPHINE SULFATE PRN MG: 2 INJECTION, SOLUTION INTRAMUSCULAR; INTRAVENOUS at 11:36

## 2023-12-14 RX ADMIN — DOPAMINE HYDROCHLORIDE IN DEXTROSE SCH MLS/HR: 1.6 INJECTION, SOLUTION INTRAVENOUS at 02:00

## 2023-12-14 RX ADMIN — HYDROCODONE BITARTRATE AND ACETAMINOPHEN PRN TAB: 10; 325 TABLET ORAL at 01:56

## 2023-12-14 RX ADMIN — SODIUM CHLORIDE SCH MCG: 9 INJECTION, SOLUTION INTRAVENOUS at 14:00

## 2023-12-14 RX ADMIN — RANOLAZINE SCH MG: 500 TABLET, FILM COATED, EXTENDED RELEASE ORAL at 09:59

## 2023-12-14 RX ADMIN — ALBUTEROL SULFATE SCH TAB: 90 AEROSOL, METERED RESPIRATORY (INHALATION) at 10:00

## 2023-12-20 ENCOUNTER — HOSPITAL ENCOUNTER (INPATIENT)
Dept: HOSPITAL 15 - ER | Age: 68
LOS: 3 days | Discharge: HOME | DRG: 189 | End: 2023-12-23
Attending: FAMILY MEDICINE | Admitting: NURSE PRACTITIONER
Payer: COMMERCIAL

## 2023-12-20 VITALS
DIASTOLIC BLOOD PRESSURE: 45 MMHG | RESPIRATION RATE: 18 BRPM | TEMPERATURE: 98.2 F | SYSTOLIC BLOOD PRESSURE: 110 MMHG | OXYGEN SATURATION: 98 % | HEART RATE: 51 BPM

## 2023-12-20 VITALS
HEART RATE: 53 BPM | TEMPERATURE: 98.2 F | RESPIRATION RATE: 16 BRPM | SYSTOLIC BLOOD PRESSURE: 110 MMHG | DIASTOLIC BLOOD PRESSURE: 45 MMHG | OXYGEN SATURATION: 98 %

## 2023-12-20 VITALS — WEIGHT: 315 LBS | HEIGHT: 68 IN | BODY MASS INDEX: 47.74 KG/M2

## 2023-12-20 VITALS — HEART RATE: 47 BPM | OXYGEN SATURATION: 100 % | RESPIRATION RATE: 18 BRPM

## 2023-12-20 VITALS — TEMPERATURE: 98.6 F | RESPIRATION RATE: 16 BRPM | OXYGEN SATURATION: 96 % | HEART RATE: 62 BPM

## 2023-12-20 VITALS — RESPIRATION RATE: 11 BRPM | HEART RATE: 62 BPM | OXYGEN SATURATION: 94 %

## 2023-12-20 VITALS — OXYGEN SATURATION: 100 % | HEART RATE: 46 BPM | RESPIRATION RATE: 18 BRPM

## 2023-12-20 VITALS — RESPIRATION RATE: 14 BRPM | HEART RATE: 60 BPM | OXYGEN SATURATION: 94 %

## 2023-12-20 VITALS — HEART RATE: 59 BPM | RESPIRATION RATE: 12 BRPM | OXYGEN SATURATION: 98 %

## 2023-12-20 DIAGNOSIS — K21.9: ICD-10-CM

## 2023-12-20 DIAGNOSIS — M54.9: ICD-10-CM

## 2023-12-20 DIAGNOSIS — Z82.3: ICD-10-CM

## 2023-12-20 DIAGNOSIS — J44.1: ICD-10-CM

## 2023-12-20 DIAGNOSIS — I50.9: ICD-10-CM

## 2023-12-20 DIAGNOSIS — Z81.8: ICD-10-CM

## 2023-12-20 DIAGNOSIS — F41.9: ICD-10-CM

## 2023-12-20 DIAGNOSIS — E66.01: ICD-10-CM

## 2023-12-20 DIAGNOSIS — F32.A: ICD-10-CM

## 2023-12-20 DIAGNOSIS — Z86.73: ICD-10-CM

## 2023-12-20 DIAGNOSIS — Z90.49: ICD-10-CM

## 2023-12-20 DIAGNOSIS — J44.89: ICD-10-CM

## 2023-12-20 DIAGNOSIS — I25.10: ICD-10-CM

## 2023-12-20 DIAGNOSIS — Z82.5: ICD-10-CM

## 2023-12-20 DIAGNOSIS — E11.9: ICD-10-CM

## 2023-12-20 DIAGNOSIS — M54.2: ICD-10-CM

## 2023-12-20 DIAGNOSIS — Z83.3: ICD-10-CM

## 2023-12-20 DIAGNOSIS — G89.4: ICD-10-CM

## 2023-12-20 DIAGNOSIS — J96.00: Primary | ICD-10-CM

## 2023-12-20 DIAGNOSIS — I11.0: ICD-10-CM

## 2023-12-20 DIAGNOSIS — Z74.01: ICD-10-CM

## 2023-12-20 DIAGNOSIS — E78.5: ICD-10-CM

## 2023-12-20 DIAGNOSIS — Z82.49: ICD-10-CM

## 2023-12-20 DIAGNOSIS — Z95.5: ICD-10-CM

## 2023-12-20 DIAGNOSIS — F17.210: ICD-10-CM

## 2023-12-20 LAB
ALBUMIN SERPL-MCNC: 3.5 G/DL (ref 3.2–4.8)
ALP SERPL-CCNC: 66 U/L (ref 46–116)
ALT SERPL-CCNC: 22 U/L (ref 7–40)
ANION GAP SERPL CALCULATED.3IONS-SCNC: 6 MMOL/L (ref 5–15)
APTT PPP: 26.1 SEC (ref 24.5–34.5)
BASE EXCESS BLDV CALC-SCNC: 2.9 MMOL/L (ref -2–2)
BILIRUB SERPL-MCNC: 0.4 MG/DL (ref 0.2–1)
BUN SERPL-MCNC: 26 MG/DL (ref 9–23)
BUN/CREAT SERPL: 18.4 (ref 10–20)
CALCIUM SERPL-MCNC: 8.9 MG/DL (ref 8.7–10.4)
CHLORIDE SERPL-SCNC: 114 MMOL/L (ref 98–107)
CO2 SERPL-SCNC: 29 MMOL/L (ref 20–30)
GLUCOSE SERPL-MCNC: 96 MG/DL (ref 74–106)
HCT VFR BLD AUTO: 37.1 % (ref 41–53)
HGB BLD-MCNC: 12 G/DL (ref 13.5–17.5)
INR PPP: 1.1 (ref 0.9–1.15)
MAGNESIUM SERPL-MCNC: 1.8 MG/DL (ref 1.6–2.6)
MCH RBC QN AUTO: 29 PG (ref 28–32)
MCV RBC AUTO: 90 FL (ref 80–100)
NRBC BLD QL AUTO: 0 %
POTASSIUM SERPL-SCNC: 3.4 MMOL/L (ref 3.5–5.1)
PROT SERPL-MCNC: 6.4 G/DL (ref 5.7–8.2)
PROTHROMBIN TIME: 11.5 SEC (ref 9.3–11.8)
SODIUM SERPL-SCNC: 149 MMOL/L (ref 136–145)

## 2023-12-20 PROCEDURE — 80053 COMPREHEN METABOLIC PANEL: CPT

## 2023-12-20 PROCEDURE — 36415 COLL VENOUS BLD VENIPUNCTURE: CPT

## 2023-12-20 PROCEDURE — 94640 AIRWAY INHALATION TREATMENT: CPT

## 2023-12-20 PROCEDURE — 71045 X-RAY EXAM CHEST 1 VIEW: CPT

## 2023-12-20 PROCEDURE — 84484 ASSAY OF TROPONIN QUANT: CPT

## 2023-12-20 PROCEDURE — 83880 ASSAY OF NATRIURETIC PEPTIDE: CPT

## 2023-12-20 PROCEDURE — 96365 THER/PROPH/DIAG IV INF INIT: CPT

## 2023-12-20 PROCEDURE — 96367 TX/PROPH/DG ADDL SEQ IV INF: CPT

## 2023-12-20 PROCEDURE — 85610 PROTHROMBIN TIME: CPT

## 2023-12-20 PROCEDURE — 93005 ELECTROCARDIOGRAM TRACING: CPT

## 2023-12-20 PROCEDURE — 85025 COMPLETE CBC W/AUTO DIFF WBC: CPT

## 2023-12-20 PROCEDURE — 85730 THROMBOPLASTIN TIME PARTIAL: CPT

## 2023-12-20 PROCEDURE — 83735 ASSAY OF MAGNESIUM: CPT

## 2023-12-20 PROCEDURE — 96375 TX/PRO/DX INJ NEW DRUG ADDON: CPT

## 2023-12-20 PROCEDURE — 87081 CULTURE SCREEN ONLY: CPT

## 2023-12-20 PROCEDURE — 82805 BLOOD GASES W/O2 SATURATION: CPT

## 2023-12-20 PROCEDURE — 36600 WITHDRAWAL OF ARTERIAL BLOOD: CPT

## 2023-12-20 PROCEDURE — 97163 PT EVAL HIGH COMPLEX 45 MIN: CPT

## 2023-12-20 PROCEDURE — 81001 URINALYSIS AUTO W/SCOPE: CPT

## 2023-12-20 RX ADMIN — ATORVASTATIN CALCIUM SCH MG: 20 TABLET, FILM COATED ORAL at 21:56

## 2023-12-20 RX ADMIN — SODIUM CHLORIDE SCH ML: 9 INJECTION INTRAMUSCULAR; INTRAVENOUS; SUBCUTANEOUS at 14:00

## 2023-12-20 RX ADMIN — GABAPENTIN SCH MG: 400 CAPSULE ORAL at 21:56

## 2023-12-20 RX ADMIN — BUMETANIDE SCH MG: 1 TABLET ORAL at 18:54

## 2023-12-20 RX ADMIN — SODIUM CHLORIDE SCH ML: 9 INJECTION INTRAMUSCULAR; INTRAVENOUS; SUBCUTANEOUS at 21:56

## 2023-12-21 VITALS
OXYGEN SATURATION: 98 % | RESPIRATION RATE: 18 BRPM | SYSTOLIC BLOOD PRESSURE: 120 MMHG | HEART RATE: 50 BPM | DIASTOLIC BLOOD PRESSURE: 41 MMHG | TEMPERATURE: 98.8 F

## 2023-12-21 VITALS — HEART RATE: 55 BPM | OXYGEN SATURATION: 98 % | RESPIRATION RATE: 18 BRPM

## 2023-12-21 VITALS
OXYGEN SATURATION: 98 % | RESPIRATION RATE: 18 BRPM | TEMPERATURE: 97.9 F | SYSTOLIC BLOOD PRESSURE: 135 MMHG | DIASTOLIC BLOOD PRESSURE: 53 MMHG | HEART RATE: 49 BPM

## 2023-12-21 VITALS
RESPIRATION RATE: 16 BRPM | DIASTOLIC BLOOD PRESSURE: 66 MMHG | TEMPERATURE: 98.1 F | SYSTOLIC BLOOD PRESSURE: 155 MMHG | OXYGEN SATURATION: 99 % | HEART RATE: 51 BPM

## 2023-12-21 VITALS
SYSTOLIC BLOOD PRESSURE: 91 MMHG | HEART RATE: 50 BPM | RESPIRATION RATE: 18 BRPM | TEMPERATURE: 97.7 F | OXYGEN SATURATION: 92 % | DIASTOLIC BLOOD PRESSURE: 52 MMHG

## 2023-12-21 VITALS
HEART RATE: 50 BPM | RESPIRATION RATE: 18 BRPM | SYSTOLIC BLOOD PRESSURE: 135 MMHG | OXYGEN SATURATION: 98 % | DIASTOLIC BLOOD PRESSURE: 53 MMHG

## 2023-12-21 VITALS — OXYGEN SATURATION: 98 %

## 2023-12-21 VITALS
TEMPERATURE: 98.8 F | DIASTOLIC BLOOD PRESSURE: 50 MMHG | HEART RATE: 51 BPM | SYSTOLIC BLOOD PRESSURE: 107 MMHG | RESPIRATION RATE: 17 BRPM | OXYGEN SATURATION: 99 %

## 2023-12-21 LAB
ALBUMIN SERPL-MCNC: 3.5 G/DL (ref 3.2–4.8)
ALP SERPL-CCNC: 55 U/L (ref 46–116)
ALT SERPL-CCNC: 21 U/L (ref 7–40)
ANION GAP SERPL CALCULATED.3IONS-SCNC: 5 MMOL/L (ref 5–15)
BILIRUB SERPL-MCNC: 0.4 MG/DL (ref 0.2–1)
BUN SERPL-MCNC: 18 MG/DL (ref 9–23)
BUN/CREAT SERPL: 14.8 (ref 10–20)
CALCIUM SERPL-MCNC: 9 MG/DL (ref 8.5–10.1)
CHLORIDE SERPL-SCNC: 110 MMOL/L (ref 98–107)
CO2 SERPL-SCNC: 28 MMOL/L (ref 20–30)
GLUCOSE SERPL-MCNC: 85 MG/DL (ref 74–106)
HCT VFR BLD AUTO: 36.4 % (ref 41–53)
HGB BLD-MCNC: 11.6 G/DL (ref 13.5–17.5)
MCH RBC QN AUTO: 28.6 PG (ref 28–32)
MCV RBC AUTO: 89.6 FL (ref 80–100)
NRBC BLD QL AUTO: 0 %
POTASSIUM SERPL-SCNC: 3.6 MMOL/L (ref 3.5–5.1)
PROT SERPL-MCNC: 6.5 G/DL (ref 5.7–8.2)
SODIUM SERPL-SCNC: 143 MMOL/L (ref 136–145)

## 2023-12-21 RX ADMIN — SODIUM CHLORIDE SCH ML: 9 INJECTION INTRAMUSCULAR; INTRAVENOUS; SUBCUTANEOUS at 06:16

## 2023-12-21 RX ADMIN — BUMETANIDE SCH MG: 1 TABLET ORAL at 18:00

## 2023-12-21 RX ADMIN — ATORVASTATIN CALCIUM SCH MG: 20 TABLET, FILM COATED ORAL at 21:15

## 2023-12-21 RX ADMIN — CLOPIDOGREL BISULFATE SCH MG: 75 TABLET ORAL at 10:59

## 2023-12-21 RX ADMIN — SODIUM CHLORIDE SCH ML: 9 INJECTION INTRAMUSCULAR; INTRAVENOUS; SUBCUTANEOUS at 21:22

## 2023-12-21 RX ADMIN — METOPROLOL SUCCINATE SCH MG: 50 TABLET, EXTENDED RELEASE ORAL at 10:00

## 2023-12-21 RX ADMIN — PANTOPRAZOLE SODIUM SCH MG: 40 TABLET, DELAYED RELEASE ORAL at 06:05

## 2023-12-21 RX ADMIN — LISINOPRIL SCH MG: 20 TABLET ORAL at 11:02

## 2023-12-21 RX ADMIN — SODIUM CHLORIDE SCH ML: 9 INJECTION INTRAMUSCULAR; INTRAVENOUS; SUBCUTANEOUS at 14:56

## 2023-12-21 RX ADMIN — ISOSORBIDE MONONITRATE SCH MG: 60 TABLET, EXTENDED RELEASE ORAL at 10:59

## 2023-12-21 RX ADMIN — OXYCODONE HYDROCHLORIDE AND ACETAMINOPHEN PRN TAB: 5; 325 TABLET ORAL at 21:16

## 2023-12-21 RX ADMIN — GABAPENTIN SCH MG: 400 CAPSULE ORAL at 21:15

## 2023-12-21 RX ADMIN — GABAPENTIN SCH MG: 400 CAPSULE ORAL at 11:01

## 2023-12-21 RX ADMIN — OXYCODONE HYDROCHLORIDE AND ACETAMINOPHEN PRN TAB: 5; 325 TABLET ORAL at 14:56

## 2023-12-21 RX ADMIN — BUMETANIDE SCH MG: 1 TABLET ORAL at 06:05

## 2023-12-21 RX ADMIN — ASPIRIN SCH MG: 81 TABLET ORAL at 10:58

## 2023-12-21 RX ADMIN — LOSARTAN POTASSIUM SCH MG: 25 TABLET, FILM COATED ORAL at 11:01

## 2023-12-22 VITALS
RESPIRATION RATE: 17 BRPM | DIASTOLIC BLOOD PRESSURE: 48 MMHG | SYSTOLIC BLOOD PRESSURE: 103 MMHG | HEART RATE: 51 BPM | TEMPERATURE: 97.7 F | OXYGEN SATURATION: 92 %

## 2023-12-22 VITALS
SYSTOLIC BLOOD PRESSURE: 93 MMHG | HEART RATE: 51 BPM | RESPIRATION RATE: 18 BRPM | TEMPERATURE: 97.6 F | DIASTOLIC BLOOD PRESSURE: 55 MMHG | OXYGEN SATURATION: 94 %

## 2023-12-22 VITALS
OXYGEN SATURATION: 99 % | HEART RATE: 55 BPM | TEMPERATURE: 98.4 F | RESPIRATION RATE: 18 BRPM | SYSTOLIC BLOOD PRESSURE: 105 MMHG | DIASTOLIC BLOOD PRESSURE: 45 MMHG

## 2023-12-22 VITALS
OXYGEN SATURATION: 97 % | TEMPERATURE: 97.6 F | RESPIRATION RATE: 17 BRPM | DIASTOLIC BLOOD PRESSURE: 50 MMHG | SYSTOLIC BLOOD PRESSURE: 99 MMHG | HEART RATE: 48 BPM

## 2023-12-22 VITALS
SYSTOLIC BLOOD PRESSURE: 103 MMHG | DIASTOLIC BLOOD PRESSURE: 48 MMHG | TEMPERATURE: 97.7 F | OXYGEN SATURATION: 92 % | RESPIRATION RATE: 17 BRPM | HEART RATE: 51 BPM

## 2023-12-22 VITALS
SYSTOLIC BLOOD PRESSURE: 94 MMHG | RESPIRATION RATE: 17 BRPM | OXYGEN SATURATION: 99 % | HEART RATE: 48 BPM | TEMPERATURE: 97.3 F | DIASTOLIC BLOOD PRESSURE: 65 MMHG

## 2023-12-22 VITALS
DIASTOLIC BLOOD PRESSURE: 65 MMHG | SYSTOLIC BLOOD PRESSURE: 94 MMHG | OXYGEN SATURATION: 99 % | HEART RATE: 48 BPM | TEMPERATURE: 97.3 F | RESPIRATION RATE: 17 BRPM

## 2023-12-22 VITALS — OXYGEN SATURATION: 97 %

## 2023-12-22 VITALS — HEART RATE: 52 BPM

## 2023-12-22 VITALS — RESPIRATION RATE: 18 BRPM | HEART RATE: 50 BPM | OXYGEN SATURATION: 98 %

## 2023-12-22 VITALS — OXYGEN SATURATION: 98 %

## 2023-12-22 VITALS — OXYGEN SATURATION: 96 %

## 2023-12-22 VITALS — OXYGEN SATURATION: 92 %

## 2023-12-22 RX ADMIN — BUMETANIDE SCH MG: 1 TABLET ORAL at 06:00

## 2023-12-22 RX ADMIN — ISOSORBIDE MONONITRATE SCH MG: 60 TABLET, EXTENDED RELEASE ORAL at 09:53

## 2023-12-22 RX ADMIN — OXYCODONE HYDROCHLORIDE AND ACETAMINOPHEN PRN TAB: 5; 325 TABLET ORAL at 03:23

## 2023-12-22 RX ADMIN — METOPROLOL SUCCINATE SCH MG: 50 TABLET, EXTENDED RELEASE ORAL at 09:55

## 2023-12-22 RX ADMIN — CLOPIDOGREL BISULFATE SCH MG: 75 TABLET ORAL at 09:50

## 2023-12-22 RX ADMIN — ASPIRIN SCH MG: 81 TABLET ORAL at 09:50

## 2023-12-22 RX ADMIN — PANTOPRAZOLE SODIUM SCH MG: 40 TABLET, DELAYED RELEASE ORAL at 06:35

## 2023-12-22 RX ADMIN — LISINOPRIL SCH MG: 20 TABLET ORAL at 09:55

## 2023-12-22 RX ADMIN — GABAPENTIN SCH MG: 400 CAPSULE ORAL at 09:53

## 2023-12-22 RX ADMIN — SODIUM CHLORIDE SCH ML: 9 INJECTION INTRAMUSCULAR; INTRAVENOUS; SUBCUTANEOUS at 06:36

## 2023-12-22 RX ADMIN — ATORVASTATIN CALCIUM SCH MG: 20 TABLET, FILM COATED ORAL at 21:51

## 2023-12-22 RX ADMIN — LOSARTAN POTASSIUM SCH MG: 25 TABLET, FILM COATED ORAL at 09:54

## 2023-12-22 RX ADMIN — BUMETANIDE SCH MG: 1 TABLET ORAL at 18:00

## 2023-12-22 RX ADMIN — OXYCODONE HYDROCHLORIDE AND ACETAMINOPHEN PRN TAB: 5; 325 TABLET ORAL at 16:33

## 2023-12-22 RX ADMIN — SODIUM CHLORIDE SCH ML: 9 INJECTION INTRAMUSCULAR; INTRAVENOUS; SUBCUTANEOUS at 21:53

## 2023-12-22 RX ADMIN — SODIUM CHLORIDE SCH ML: 9 INJECTION INTRAMUSCULAR; INTRAVENOUS; SUBCUTANEOUS at 14:00

## 2023-12-22 RX ADMIN — OXYCODONE HYDROCHLORIDE AND ACETAMINOPHEN PRN TAB: 5; 325 TABLET ORAL at 09:53

## 2023-12-22 RX ADMIN — OXYCODONE HYDROCHLORIDE AND ACETAMINOPHEN PRN TAB: 5; 325 TABLET ORAL at 22:45

## 2023-12-22 RX ADMIN — GABAPENTIN SCH MG: 400 CAPSULE ORAL at 21:52

## 2023-12-23 VITALS
DIASTOLIC BLOOD PRESSURE: 55 MMHG | RESPIRATION RATE: 19 BRPM | TEMPERATURE: 97.9 F | HEART RATE: 52 BPM | OXYGEN SATURATION: 98 % | SYSTOLIC BLOOD PRESSURE: 108 MMHG

## 2023-12-23 VITALS
RESPIRATION RATE: 18 BRPM | OXYGEN SATURATION: 98 % | DIASTOLIC BLOOD PRESSURE: 50 MMHG | SYSTOLIC BLOOD PRESSURE: 100 MMHG | HEART RATE: 52 BPM | TEMPERATURE: 98 F

## 2023-12-23 VITALS — RESPIRATION RATE: 18 BRPM | HEART RATE: 52 BPM | OXYGEN SATURATION: 98 %

## 2023-12-23 VITALS
HEART RATE: 52 BPM | TEMPERATURE: 97.9 F | DIASTOLIC BLOOD PRESSURE: 62 MMHG | RESPIRATION RATE: 20 BRPM | SYSTOLIC BLOOD PRESSURE: 120 MMHG | OXYGEN SATURATION: 100 %

## 2023-12-23 VITALS
DIASTOLIC BLOOD PRESSURE: 55 MMHG | SYSTOLIC BLOOD PRESSURE: 108 MMHG | OXYGEN SATURATION: 95 % | HEART RATE: 50 BPM | RESPIRATION RATE: 18 BRPM

## 2023-12-23 VITALS — DIASTOLIC BLOOD PRESSURE: 50 MMHG | TEMPERATURE: 98.06 F | SYSTOLIC BLOOD PRESSURE: 100 MMHG | HEART RATE: 52 BPM

## 2023-12-23 RX ADMIN — SODIUM CHLORIDE SCH ML: 9 INJECTION INTRAMUSCULAR; INTRAVENOUS; SUBCUTANEOUS at 05:44

## 2023-12-23 RX ADMIN — LOSARTAN POTASSIUM SCH MG: 25 TABLET, FILM COATED ORAL at 09:16

## 2023-12-23 RX ADMIN — OXYCODONE HYDROCHLORIDE AND ACETAMINOPHEN PRN TAB: 5; 325 TABLET ORAL at 05:42

## 2023-12-23 RX ADMIN — ISOSORBIDE MONONITRATE SCH MG: 60 TABLET, EXTENDED RELEASE ORAL at 09:16

## 2023-12-23 RX ADMIN — BUMETANIDE SCH MG: 1 TABLET ORAL at 05:43

## 2023-12-23 RX ADMIN — SODIUM CHLORIDE SCH ML: 9 INJECTION INTRAMUSCULAR; INTRAVENOUS; SUBCUTANEOUS at 14:00

## 2023-12-23 RX ADMIN — PANTOPRAZOLE SODIUM SCH MG: 40 TABLET, DELAYED RELEASE ORAL at 05:43

## 2023-12-23 RX ADMIN — CLOPIDOGREL BISULFATE SCH MG: 75 TABLET ORAL at 09:15

## 2023-12-23 RX ADMIN — OXYCODONE HYDROCHLORIDE AND ACETAMINOPHEN PRN TAB: 5; 325 TABLET ORAL at 11:46

## 2023-12-23 RX ADMIN — ASPIRIN SCH MG: 81 TABLET ORAL at 09:15

## 2023-12-23 RX ADMIN — LISINOPRIL SCH MG: 20 TABLET ORAL at 09:15

## 2023-12-23 RX ADMIN — METOPROLOL SUCCINATE SCH MG: 50 TABLET, EXTENDED RELEASE ORAL at 09:17

## 2023-12-23 RX ADMIN — GABAPENTIN SCH MG: 400 CAPSULE ORAL at 09:15

## 2024-01-07 ENCOUNTER — HOSPITAL ENCOUNTER (INPATIENT)
Dept: HOSPITAL 15 - ER | Age: 69
LOS: 2 days | Discharge: HOME | DRG: 206 | End: 2024-01-09
Attending: INTERNAL MEDICINE | Admitting: INTERNAL MEDICINE
Payer: COMMERCIAL

## 2024-01-07 VITALS — OXYGEN SATURATION: 99 % | HEART RATE: 48 BPM | RESPIRATION RATE: 16 BRPM

## 2024-01-07 VITALS — HEIGHT: 68 IN | WEIGHT: 315 LBS | BODY MASS INDEX: 47.74 KG/M2

## 2024-01-07 VITALS — HEART RATE: 49 BPM | RESPIRATION RATE: 15 BRPM | OXYGEN SATURATION: 98 %

## 2024-01-07 VITALS — OXYGEN SATURATION: 98 % | HEART RATE: 45 BPM | RESPIRATION RATE: 14 BRPM

## 2024-01-07 DIAGNOSIS — E78.5: ICD-10-CM

## 2024-01-07 DIAGNOSIS — Z86.73: ICD-10-CM

## 2024-01-07 DIAGNOSIS — Z20.822: ICD-10-CM

## 2024-01-07 DIAGNOSIS — D64.9: ICD-10-CM

## 2024-01-07 DIAGNOSIS — I25.2: ICD-10-CM

## 2024-01-07 DIAGNOSIS — B95.8: ICD-10-CM

## 2024-01-07 DIAGNOSIS — F41.9: ICD-10-CM

## 2024-01-07 DIAGNOSIS — I13.0: ICD-10-CM

## 2024-01-07 DIAGNOSIS — Z98.61: ICD-10-CM

## 2024-01-07 DIAGNOSIS — R07.89: ICD-10-CM
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
## 2024-01-07 DIAGNOSIS — Z83.3: ICD-10-CM

## 2024-01-07 DIAGNOSIS — B95.2: ICD-10-CM

## 2024-01-07 DIAGNOSIS — K59.00: ICD-10-CM

## 2024-01-07 DIAGNOSIS — T44.7X5A: ICD-10-CM

## 2024-01-07 DIAGNOSIS — E66.01: ICD-10-CM

## 2024-01-07 DIAGNOSIS — R00.1: ICD-10-CM

## 2024-01-07 DIAGNOSIS — G89.4: ICD-10-CM

## 2024-01-07 DIAGNOSIS — Z82.49: ICD-10-CM

## 2024-01-07 DIAGNOSIS — I35.0: ICD-10-CM

## 2024-01-07 DIAGNOSIS — K21.9: ICD-10-CM

## 2024-01-07 DIAGNOSIS — Z79.899: ICD-10-CM

## 2024-01-07 DIAGNOSIS — Z81.8: ICD-10-CM

## 2024-01-07 DIAGNOSIS — F17.210: ICD-10-CM

## 2024-01-07 DIAGNOSIS — N17.9: ICD-10-CM

## 2024-01-07 DIAGNOSIS — M94.0: Primary | ICD-10-CM

## 2024-01-07 DIAGNOSIS — N39.0: ICD-10-CM

## 2024-01-07 DIAGNOSIS — I25.10: ICD-10-CM

## 2024-01-07 DIAGNOSIS — Z82.3: ICD-10-CM

## 2024-01-07 DIAGNOSIS — J44.89: ICD-10-CM

## 2024-01-07 DIAGNOSIS — Z90.49: ICD-10-CM

## 2024-01-07 DIAGNOSIS — I50.32: ICD-10-CM

## 2024-01-07 DIAGNOSIS — E11.22: ICD-10-CM

## 2024-01-07 DIAGNOSIS — N18.32: ICD-10-CM

## 2024-01-07 DIAGNOSIS — Z82.5: ICD-10-CM

## 2024-01-07 DIAGNOSIS — F32.A: ICD-10-CM

## 2024-01-07 LAB
ALBUMIN SERPL-MCNC: 3.9 G/DL (ref 3.2–4.8)
ALP SERPL-CCNC: 74 U/L (ref 46–116)
ALT SERPL-CCNC: 13 U/L (ref 7–40)
ANION GAP SERPL CALCULATED.3IONS-SCNC: 8 MMOL/L (ref 5–15)
APTT PPP: 25.6 SEC (ref 24.5–34.5)
BASE EXCESS BLDV CALC-SCNC: 0.4 MMOL/L (ref -2–2)
BILIRUB SERPL-MCNC: 0.4 MG/DL (ref 0.2–1)
BUN SERPL-MCNC: 29 MG/DL (ref 9–23)
BUN/CREAT SERPL: 16.6 (ref 10–20)
CALCIUM SERPL-MCNC: 9.4 MG/DL (ref 8.7–10.4)
CHLORIDE SERPL-SCNC: 109 MMOL/L (ref 98–107)
CO2 SERPL-SCNC: 26 MMOL/L (ref 20–30)
GLUCOSE SERPL-MCNC: 101 MG/DL (ref 74–106)
HCT VFR BLD AUTO: 38.3 % (ref 41–53)
HGB BLD-MCNC: 12.3 G/DL (ref 13.5–17.5)
INR PPP: 1.05 (ref 0.9–1.15)
MAGNESIUM SERPL-MCNC: 1.9 MG/DL (ref 1.6–2.6)
MCH RBC QN AUTO: 28.4 PG (ref 28–32)
MCV RBC AUTO: 88.3 FL (ref 80–100)
NRBC BLD QL AUTO: 0 %
POTASSIUM SERPL-SCNC: 3.5 MMOL/L (ref 3.5–5.1)
PROT SERPL-MCNC: 7.2 G/DL (ref 5.7–8.2)
PROTHROMBIN TIME: 11 SEC (ref 9.3–11.8)
SODIUM SERPL-SCNC: 143 MMOL/L (ref 136–145)

## 2024-01-07 PROCEDURE — 80048 BASIC METABOLIC PNL TOTAL CA: CPT

## 2024-01-07 PROCEDURE — 80053 COMPREHEN METABOLIC PANEL: CPT

## 2024-01-07 PROCEDURE — 80061 LIPID PANEL: CPT

## 2024-01-07 PROCEDURE — 82746 ASSAY OF FOLIC ACID SERUM: CPT

## 2024-01-07 PROCEDURE — 85610 PROTHROMBIN TIME: CPT

## 2024-01-07 PROCEDURE — 80307 DRUG TEST PRSMV CHEM ANLYZR: CPT

## 2024-01-07 PROCEDURE — 82805 BLOOD GASES W/O2 SATURATION: CPT

## 2024-01-07 PROCEDURE — 85045 AUTOMATED RETICULOCYTE COUNT: CPT

## 2024-01-07 PROCEDURE — 84484 ASSAY OF TROPONIN QUANT: CPT

## 2024-01-07 PROCEDURE — 83010 ASSAY OF HAPTOGLOBIN QUANT: CPT

## 2024-01-07 PROCEDURE — 85730 THROMBOPLASTIN TIME PARTIAL: CPT

## 2024-01-07 PROCEDURE — 97163 PT EVAL HIGH COMPLEX 45 MIN: CPT

## 2024-01-07 PROCEDURE — 87086 URINE CULTURE/COLONY COUNT: CPT

## 2024-01-07 PROCEDURE — 76775 US EXAM ABDO BACK WALL LIM: CPT

## 2024-01-07 PROCEDURE — 83690 ASSAY OF LIPASE: CPT

## 2024-01-07 PROCEDURE — 87804 INFLUENZA ASSAY W/OPTIC: CPT

## 2024-01-07 PROCEDURE — 87426 SARSCOV CORONAVIRUS AG IA: CPT

## 2024-01-07 PROCEDURE — 82607 VITAMIN B-12: CPT

## 2024-01-07 PROCEDURE — 83615 LACTATE (LD) (LDH) ENZYME: CPT

## 2024-01-07 PROCEDURE — 83880 ASSAY OF NATRIURETIC PEPTIDE: CPT

## 2024-01-07 PROCEDURE — 82728 ASSAY OF FERRITIN: CPT

## 2024-01-07 PROCEDURE — 83735 ASSAY OF MAGNESIUM: CPT

## 2024-01-07 PROCEDURE — 36415 COLL VENOUS BLD VENIPUNCTURE: CPT

## 2024-01-07 PROCEDURE — 93306 TTE W/DOPPLER COMPLETE: CPT

## 2024-01-07 PROCEDURE — 94640 AIRWAY INHALATION TREATMENT: CPT

## 2024-01-07 PROCEDURE — 71045 X-RAY EXAM CHEST 1 VIEW: CPT

## 2024-01-07 PROCEDURE — 36600 WITHDRAWAL OF ARTERIAL BLOOD: CPT

## 2024-01-07 PROCEDURE — 83540 ASSAY OF IRON: CPT

## 2024-01-07 PROCEDURE — 87340 HEPATITIS B SURFACE AG IA: CPT

## 2024-01-07 PROCEDURE — 84443 ASSAY THYROID STIM HORMONE: CPT

## 2024-01-07 PROCEDURE — 85025 COMPLETE CBC W/AUTO DIFF WBC: CPT

## 2024-01-07 PROCEDURE — 86803 HEPATITIS C AB TEST: CPT

## 2024-01-07 PROCEDURE — 81001 URINALYSIS AUTO W/SCOPE: CPT

## 2024-01-07 PROCEDURE — 93005 ELECTROCARDIOGRAM TRACING: CPT

## 2024-01-07 PROCEDURE — 83550 IRON BINDING TEST: CPT

## 2024-01-07 PROCEDURE — 84100 ASSAY OF PHOSPHORUS: CPT

## 2024-01-07 PROCEDURE — 85379 FIBRIN DEGRADATION QUANT: CPT

## 2024-01-07 RX ADMIN — DOCUSATE SODIUM SCH MG: 100 CAPSULE, LIQUID FILLED ORAL at 10:55

## 2024-01-07 RX ADMIN — ATORVASTATIN CALCIUM SCH MG: 20 TABLET, FILM COATED ORAL at 22:41

## 2024-01-07 RX ADMIN — OXYCODONE HYDROCHLORIDE AND ACETAMINOPHEN PRN TAB: 5; 325 TABLET ORAL at 17:34

## 2024-01-08 VITALS
SYSTOLIC BLOOD PRESSURE: 111 MMHG | DIASTOLIC BLOOD PRESSURE: 67 MMHG | OXYGEN SATURATION: 98 % | TEMPERATURE: 97.7 F | HEART RATE: 80 BPM | RESPIRATION RATE: 19 BRPM

## 2024-01-08 VITALS
RESPIRATION RATE: 16 BRPM | OXYGEN SATURATION: 97 % | HEART RATE: 52 BPM | SYSTOLIC BLOOD PRESSURE: 98 MMHG | DIASTOLIC BLOOD PRESSURE: 48 MMHG

## 2024-01-08 VITALS
RESPIRATION RATE: 20 BRPM | HEART RATE: 50 BPM | SYSTOLIC BLOOD PRESSURE: 99 MMHG | OXYGEN SATURATION: 95 % | TEMPERATURE: 98 F | DIASTOLIC BLOOD PRESSURE: 48 MMHG

## 2024-01-08 VITALS
SYSTOLIC BLOOD PRESSURE: 102 MMHG | DIASTOLIC BLOOD PRESSURE: 62 MMHG | OXYGEN SATURATION: 94 % | RESPIRATION RATE: 20 BRPM | HEART RATE: 55 BPM | TEMPERATURE: 98.3 F

## 2024-01-08 VITALS
TEMPERATURE: 98.1 F | OXYGEN SATURATION: 93 % | DIASTOLIC BLOOD PRESSURE: 53 MMHG | HEART RATE: 53 BPM | RESPIRATION RATE: 18 BRPM | SYSTOLIC BLOOD PRESSURE: 99 MMHG

## 2024-01-08 VITALS
SYSTOLIC BLOOD PRESSURE: 115 MMHG | TEMPERATURE: 98.6 F | HEART RATE: 52 BPM | OXYGEN SATURATION: 95 % | DIASTOLIC BLOOD PRESSURE: 63 MMHG | RESPIRATION RATE: 19 BRPM

## 2024-01-08 VITALS — OXYGEN SATURATION: 96 %

## 2024-01-08 VITALS — OXYGEN SATURATION: 97 % | RESPIRATION RATE: 16 BRPM | HEART RATE: 52 BPM

## 2024-01-08 VITALS — HEART RATE: 53 BPM

## 2024-01-08 VITALS — HEART RATE: 51 BPM

## 2024-01-08 LAB
ALBUMIN SERPL-MCNC: 3.4 G/DL (ref 3.2–4.8)
ALP SERPL-CCNC: 58 U/L (ref 46–116)
ALT SERPL-CCNC: 12 U/L (ref 7–40)
ANION GAP SERPL CALCULATED.3IONS-SCNC: 7 MMOL/L (ref 5–15)
BILIRUB SERPL-MCNC: 0.4 MG/DL (ref 0.2–1)
BUN SERPL-MCNC: 28 MG/DL (ref 9–23)
BUN/CREAT SERPL: 19.9 (ref 10–20)
CALCIUM SERPL-MCNC: 8.9 MG/DL (ref 8.7–10.4)
CHLORIDE SERPL-SCNC: 107 MMOL/L (ref 98–107)
CHOLEST SERPL-MCNC: 118 MG/DL (ref ?–200)
CO2 SERPL-SCNC: 26 MMOL/L (ref 20–30)
FERRITIN SERPL-MCNC: 84.7 NG/ML (ref 22–322)
GLUCOSE SERPL-MCNC: 107 MG/DL (ref 74–106)
HCT VFR BLD AUTO: 36.8 % (ref 41–53)
HDLC SERPL-MCNC: 31 MG/DL (ref 40–59)
HGB BLD-MCNC: 11.9 G/DL (ref 13.5–17.5)
IRON SERPL-MCNC: 40 UG/DL (ref 65–175)
MAGNESIUM SERPL-MCNC: 1.8 MG/DL (ref 1.6–2.6)
MCH RBC QN AUTO: 28.2 PG (ref 28–32)
MCV RBC AUTO: 87.6 FL (ref 80–100)
NRBC BLD QL AUTO: 0 %
POTASSIUM SERPL-SCNC: 3.7 MMOL/L (ref 3.5–5.1)
PROT SERPL-MCNC: 6.3 G/DL (ref 5.7–8.2)
SARS-COV+SARS-COV-2 AG RESP QL IA.RAPID: POSITIVE
SODIUM SERPL-SCNC: 140 MMOL/L (ref 136–145)
TIBC SERPL-MCNC: 229 UG/DL (ref 250–425)
TRIGL SERPL-MCNC: 51 MG/DL (ref ?–150)
WBC CLUMPS UR QL AUTO: PRESENT /HPF

## 2024-01-08 RX ADMIN — CLOPIDOGREL BISULFATE SCH MG: 75 TABLET ORAL at 10:07

## 2024-01-08 RX ADMIN — DOCUSATE SODIUM SCH MG: 100 CAPSULE, LIQUID FILLED ORAL at 22:00

## 2024-01-08 RX ADMIN — OXYCODONE HYDROCHLORIDE AND ACETAMINOPHEN PRN TAB: 5; 325 TABLET ORAL at 17:31

## 2024-01-08 RX ADMIN — OXYCODONE HYDROCHLORIDE AND ACETAMINOPHEN PRN TAB: 5; 325 TABLET ORAL at 10:10

## 2024-01-08 RX ADMIN — SODIUM CHLORIDE SCH MG: 9 INJECTION, SOLUTION INTRAVENOUS at 17:32

## 2024-01-08 RX ADMIN — DOCUSATE SODIUM SCH MG: 100 CAPSULE, LIQUID FILLED ORAL at 10:07

## 2024-01-08 RX ADMIN — OXYCODONE HYDROCHLORIDE AND ACETAMINOPHEN PRN TAB: 5; 325 TABLET ORAL at 23:30

## 2024-01-08 RX ADMIN — PANTOPRAZOLE SODIUM SCH MG: 40 TABLET, DELAYED RELEASE ORAL at 10:07

## 2024-01-08 RX ADMIN — ATORVASTATIN CALCIUM SCH MG: 20 TABLET, FILM COATED ORAL at 22:00

## 2024-01-08 RX ADMIN — OXYCODONE HYDROCHLORIDE AND ACETAMINOPHEN PRN TAB: 5; 325 TABLET ORAL at 00:51

## 2024-01-09 VITALS — OXYGEN SATURATION: 95 %

## 2024-01-09 VITALS
SYSTOLIC BLOOD PRESSURE: 120 MMHG | RESPIRATION RATE: 20 BRPM | TEMPERATURE: 97.8 F | DIASTOLIC BLOOD PRESSURE: 67 MMHG | OXYGEN SATURATION: 95 % | HEART RATE: 55 BPM

## 2024-01-09 VITALS
HEART RATE: 49 BPM | SYSTOLIC BLOOD PRESSURE: 135 MMHG | TEMPERATURE: 98.42 F | DIASTOLIC BLOOD PRESSURE: 58 MMHG | RESPIRATION RATE: 18 BRPM

## 2024-01-09 VITALS
TEMPERATURE: 98.2 F | SYSTOLIC BLOOD PRESSURE: 146 MMHG | OXYGEN SATURATION: 95 % | RESPIRATION RATE: 17 BRPM | HEART RATE: 63 BPM | DIASTOLIC BLOOD PRESSURE: 66 MMHG

## 2024-01-09 VITALS — OXYGEN SATURATION: 98 % | HEART RATE: 58 BPM | RESPIRATION RATE: 20 BRPM

## 2024-01-09 VITALS
HEART RATE: 54 BPM | RESPIRATION RATE: 19 BRPM | OXYGEN SATURATION: 95 % | DIASTOLIC BLOOD PRESSURE: 58 MMHG | SYSTOLIC BLOOD PRESSURE: 135 MMHG | TEMPERATURE: 98.4 F

## 2024-01-09 VITALS — OXYGEN SATURATION: 93 %

## 2024-01-09 VITALS
OXYGEN SATURATION: 93 % | SYSTOLIC BLOOD PRESSURE: 126 MMHG | HEART RATE: 52 BPM | TEMPERATURE: 98 F | DIASTOLIC BLOOD PRESSURE: 69 MMHG | RESPIRATION RATE: 17 BRPM

## 2024-01-09 VITALS — OXYGEN SATURATION: 92 %

## 2024-01-09 VITALS — HEART RATE: 56 BPM | RESPIRATION RATE: 18 BRPM | OXYGEN SATURATION: 92 %

## 2024-01-09 VITALS — OXYGEN SATURATION: 96 %

## 2024-01-09 LAB
ANION GAP SERPL CALCULATED.3IONS-SCNC: 8 MMOL/L (ref 5–15)
BUN SERPL-MCNC: 24 MG/DL (ref 9–23)
BUN/CREAT SERPL: 18.6 (ref 10–20)
CALCIUM SERPL-MCNC: 9.4 MG/DL (ref 8.7–10.4)
CHLORIDE SERPL-SCNC: 104 MMOL/L (ref 98–107)
CO2 SERPL-SCNC: 28 MMOL/L (ref 20–30)
GLUCOSE SERPL-MCNC: 91 MG/DL (ref 74–106)
HCT VFR BLD AUTO: 39.1 % (ref 41–53)
HGB BLD-MCNC: 12.8 G/DL (ref 13.5–17.5)
MAGNESIUM SERPL-MCNC: 1.8 MG/DL (ref 1.6–2.6)
MCH RBC QN AUTO: 29 PG (ref 28–32)
MCV RBC AUTO: 88.7 FL (ref 80–100)
NRBC BLD QL AUTO: 0.3 %
POTASSIUM SERPL-SCNC: 3.7 MMOL/L (ref 3.5–5.1)
SODIUM SERPL-SCNC: 140 MMOL/L (ref 136–145)

## 2024-01-09 RX ADMIN — CLOPIDOGREL BISULFATE SCH MG: 75 TABLET ORAL at 10:30

## 2024-01-09 RX ADMIN — SODIUM CHLORIDE SCH MG: 9 INJECTION, SOLUTION INTRAVENOUS at 06:38

## 2024-01-09 RX ADMIN — OXYCODONE HYDROCHLORIDE AND ACETAMINOPHEN PRN TAB: 5; 325 TABLET ORAL at 06:43

## 2024-01-09 RX ADMIN — OXYCODONE HYDROCHLORIDE AND ACETAMINOPHEN PRN TAB: 5; 325 TABLET ORAL at 14:07

## 2024-01-09 RX ADMIN — SODIUM CHLORIDE SCH MG: 9 INJECTION, SOLUTION INTRAVENOUS at 18:00

## 2024-01-09 RX ADMIN — PANTOPRAZOLE SODIUM SCH MG: 40 TABLET, DELAYED RELEASE ORAL at 10:30

## 2024-01-09 RX ADMIN — DOCUSATE SODIUM SCH MG: 100 CAPSULE, LIQUID FILLED ORAL at 10:29

## 2024-01-26 ENCOUNTER — HOSPITAL ENCOUNTER (EMERGENCY)
Dept: HOSPITAL 15 - ER | Age: 69
Discharge: HOME | End: 2024-01-26
Payer: COMMERCIAL

## 2024-01-26 VITALS
DIASTOLIC BLOOD PRESSURE: 42 MMHG | SYSTOLIC BLOOD PRESSURE: 113 MMHG | RESPIRATION RATE: 14 BRPM | OXYGEN SATURATION: 98 % | HEART RATE: 55 BPM

## 2024-01-26 VITALS — WEIGHT: 299.83 LBS | BODY MASS INDEX: 40.61 KG/M2 | HEIGHT: 72 IN

## 2024-01-26 VITALS — TEMPERATURE: 98 F

## 2024-01-26 VITALS — RESPIRATION RATE: 12 BRPM | OXYGEN SATURATION: 96 % | HEART RATE: 62 BPM

## 2024-01-26 DIAGNOSIS — E11.22: ICD-10-CM

## 2024-01-26 DIAGNOSIS — N18.9: ICD-10-CM

## 2024-01-26 DIAGNOSIS — Z86.73: ICD-10-CM

## 2024-01-26 DIAGNOSIS — I50.9: ICD-10-CM

## 2024-01-26 DIAGNOSIS — Z90.49: ICD-10-CM

## 2024-01-26 DIAGNOSIS — K21.9: ICD-10-CM

## 2024-01-26 DIAGNOSIS — I13.0: ICD-10-CM

## 2024-01-26 DIAGNOSIS — R07.89: Primary | ICD-10-CM

## 2024-01-26 DIAGNOSIS — E78.5: ICD-10-CM

## 2024-01-26 DIAGNOSIS — J44.9: ICD-10-CM

## 2024-01-26 DIAGNOSIS — F17.210: ICD-10-CM

## 2024-01-26 DIAGNOSIS — R06.00: ICD-10-CM

## 2024-01-26 LAB
ALBUMIN SERPL-MCNC: 3.8 G/DL (ref 3.2–4.8)
ALP SERPL-CCNC: 62 U/L (ref 46–116)
ALT SERPL-CCNC: 13 U/L (ref 7–40)
ANION GAP SERPL CALCULATED.3IONS-SCNC: 5 MMOL/L (ref 5–15)
BASE EXCESS BLDV CALC-SCNC: 1.6 MMOL/L (ref -2–2)
BILIRUB SERPL-MCNC: 0.5 MG/DL (ref 0.2–1)
BUN SERPL-MCNC: 39 MG/DL (ref 9–23)
BUN/CREAT SERPL: 21.8 (ref 10–20)
CALCIUM SERPL-MCNC: 9.4 MG/DL (ref 8.7–10.4)
CHLORIDE SERPL-SCNC: 109 MMOL/L (ref 98–107)
CO2 SERPL-SCNC: 29 MMOL/L (ref 20–30)
GLUCOSE SERPL-MCNC: 91 MG/DL (ref 74–106)
HCT VFR BLD AUTO: 40.6 % (ref 41–53)
HGB BLD-MCNC: 13 G/DL (ref 13.5–17.5)
MAGNESIUM SERPL-MCNC: 2.1 MG/DL (ref 1.6–2.6)
MCH RBC QN AUTO: 28.5 PG (ref 28–32)
MCV RBC AUTO: 89.2 FL (ref 80–100)
NRBC BLD QL AUTO: 0.1 %
POTASSIUM SERPL-SCNC: 3.6 MMOL/L (ref 3.5–5.1)
PROT SERPL-MCNC: 6.9 G/DL (ref 5.7–8.2)
SODIUM SERPL-SCNC: 143 MMOL/L (ref 136–145)

## 2024-01-26 PROCEDURE — 83735 ASSAY OF MAGNESIUM: CPT

## 2024-01-26 PROCEDURE — 87040 BLOOD CULTURE FOR BACTERIA: CPT

## 2024-01-26 PROCEDURE — 80053 COMPREHEN METABOLIC PANEL: CPT

## 2024-01-26 PROCEDURE — 82805 BLOOD GASES W/O2 SATURATION: CPT

## 2024-01-26 PROCEDURE — 83880 ASSAY OF NATRIURETIC PEPTIDE: CPT

## 2024-01-26 PROCEDURE — 85025 COMPLETE CBC W/AUTO DIFF WBC: CPT

## 2024-01-26 PROCEDURE — 87077 CULTURE AEROBIC IDENTIFY: CPT

## 2024-01-26 PROCEDURE — 87186 SC STD MICRODIL/AGAR DIL: CPT

## 2024-01-26 PROCEDURE — 93005 ELECTROCARDIOGRAM TRACING: CPT

## 2024-01-26 PROCEDURE — 84484 ASSAY OF TROPONIN QUANT: CPT

## 2024-01-26 PROCEDURE — 36415 COLL VENOUS BLD VENIPUNCTURE: CPT

## 2024-01-26 PROCEDURE — 71045 X-RAY EXAM CHEST 1 VIEW: CPT

## 2024-01-26 PROCEDURE — 83605 ASSAY OF LACTIC ACID: CPT

## 2024-01-26 PROCEDURE — 36600 WITHDRAWAL OF ARTERIAL BLOOD: CPT

## 2024-05-08 ENCOUNTER — HOSPITAL ENCOUNTER (INPATIENT)
Dept: HOSPITAL 15 - ER | Age: 69
LOS: 8 days | Discharge: HOME HEALTH | DRG: 314 | End: 2024-05-16
Attending: NURSE PRACTITIONER | Admitting: INTERNAL MEDICINE
Payer: COMMERCIAL

## 2024-05-08 VITALS — OXYGEN SATURATION: 100 %

## 2024-05-08 VITALS
RESPIRATION RATE: 11 BRPM | OXYGEN SATURATION: 99 % | HEART RATE: 55 BPM | SYSTOLIC BLOOD PRESSURE: 135 MMHG | DIASTOLIC BLOOD PRESSURE: 63 MMHG

## 2024-05-08 VITALS
HEART RATE: 60 BPM | DIASTOLIC BLOOD PRESSURE: 71 MMHG | OXYGEN SATURATION: 99 % | SYSTOLIC BLOOD PRESSURE: 134 MMHG | RESPIRATION RATE: 10 BRPM

## 2024-05-08 VITALS — WEIGHT: 315 LBS | HEIGHT: 68 IN | BODY MASS INDEX: 47.74 KG/M2

## 2024-05-08 VITALS
SYSTOLIC BLOOD PRESSURE: 122 MMHG | RESPIRATION RATE: 10 BRPM | HEART RATE: 51 BPM | OXYGEN SATURATION: 99 % | DIASTOLIC BLOOD PRESSURE: 48 MMHG

## 2024-05-08 VITALS
SYSTOLIC BLOOD PRESSURE: 138 MMHG | OXYGEN SATURATION: 91 % | HEART RATE: 64 BPM | DIASTOLIC BLOOD PRESSURE: 63 MMHG | RESPIRATION RATE: 14 BRPM

## 2024-05-08 VITALS — RESPIRATION RATE: 12 BRPM | OXYGEN SATURATION: 98 % | HEART RATE: 50 BPM

## 2024-05-08 VITALS
DIASTOLIC BLOOD PRESSURE: 70 MMHG | HEART RATE: 70 BPM | RESPIRATION RATE: 13 BRPM | SYSTOLIC BLOOD PRESSURE: 130 MMHG | OXYGEN SATURATION: 100 %

## 2024-05-08 VITALS
HEART RATE: 59 BPM | OXYGEN SATURATION: 97 % | SYSTOLIC BLOOD PRESSURE: 118 MMHG | DIASTOLIC BLOOD PRESSURE: 67 MMHG | RESPIRATION RATE: 11 BRPM

## 2024-05-08 VITALS
RESPIRATION RATE: 10 BRPM | DIASTOLIC BLOOD PRESSURE: 63 MMHG | TEMPERATURE: 98 F | HEART RATE: 55 BPM | OXYGEN SATURATION: 99 % | SYSTOLIC BLOOD PRESSURE: 151 MMHG

## 2024-05-08 VITALS
RESPIRATION RATE: 10 BRPM | DIASTOLIC BLOOD PRESSURE: 35 MMHG | OXYGEN SATURATION: 98 % | HEART RATE: 54 BPM | SYSTOLIC BLOOD PRESSURE: 90 MMHG

## 2024-05-08 VITALS
DIASTOLIC BLOOD PRESSURE: 73 MMHG | RESPIRATION RATE: 8 BRPM | OXYGEN SATURATION: 97 % | SYSTOLIC BLOOD PRESSURE: 127 MMHG | HEART RATE: 59 BPM

## 2024-05-08 VITALS
SYSTOLIC BLOOD PRESSURE: 136 MMHG | DIASTOLIC BLOOD PRESSURE: 65 MMHG | HEART RATE: 60 BPM | RESPIRATION RATE: 10 BRPM | OXYGEN SATURATION: 98 %

## 2024-05-08 VITALS
RESPIRATION RATE: 10 BRPM | HEART RATE: 57 BPM | DIASTOLIC BLOOD PRESSURE: 72 MMHG | OXYGEN SATURATION: 98 % | SYSTOLIC BLOOD PRESSURE: 150 MMHG

## 2024-05-08 VITALS
OXYGEN SATURATION: 99 % | DIASTOLIC BLOOD PRESSURE: 59 MMHG | HEART RATE: 58 BPM | SYSTOLIC BLOOD PRESSURE: 141 MMHG | RESPIRATION RATE: 10 BRPM

## 2024-05-08 VITALS
SYSTOLIC BLOOD PRESSURE: 155 MMHG | DIASTOLIC BLOOD PRESSURE: 71 MMHG | OXYGEN SATURATION: 98 % | HEART RATE: 58 BPM | RESPIRATION RATE: 10 BRPM

## 2024-05-08 VITALS
SYSTOLIC BLOOD PRESSURE: 121 MMHG | DIASTOLIC BLOOD PRESSURE: 66 MMHG | HEART RATE: 55 BPM | OXYGEN SATURATION: 98 % | RESPIRATION RATE: 11 BRPM

## 2024-05-08 VITALS
OXYGEN SATURATION: 99 % | HEART RATE: 51 BPM | RESPIRATION RATE: 10 BRPM | DIASTOLIC BLOOD PRESSURE: 38 MMHG | SYSTOLIC BLOOD PRESSURE: 109 MMHG

## 2024-05-08 VITALS
OXYGEN SATURATION: 98 % | HEART RATE: 56 BPM | SYSTOLIC BLOOD PRESSURE: 146 MMHG | DIASTOLIC BLOOD PRESSURE: 114 MMHG | RESPIRATION RATE: 10 BRPM

## 2024-05-08 VITALS
RESPIRATION RATE: 10 BRPM | OXYGEN SATURATION: 99 % | DIASTOLIC BLOOD PRESSURE: 51 MMHG | HEART RATE: 54 BPM | TEMPERATURE: 98.8 F | SYSTOLIC BLOOD PRESSURE: 114 MMHG

## 2024-05-08 VITALS
OXYGEN SATURATION: 99 % | RESPIRATION RATE: 10 BRPM | SYSTOLIC BLOOD PRESSURE: 131 MMHG | HEART RATE: 52 BPM | DIASTOLIC BLOOD PRESSURE: 49 MMHG

## 2024-05-08 VITALS
SYSTOLIC BLOOD PRESSURE: 155 MMHG | RESPIRATION RATE: 9 BRPM | OXYGEN SATURATION: 98 % | HEART RATE: 57 BPM | DIASTOLIC BLOOD PRESSURE: 77 MMHG

## 2024-05-08 VITALS
RESPIRATION RATE: 10 BRPM | SYSTOLIC BLOOD PRESSURE: 99 MMHG | OXYGEN SATURATION: 98 % | DIASTOLIC BLOOD PRESSURE: 39 MMHG | HEART RATE: 52 BPM

## 2024-05-08 VITALS
OXYGEN SATURATION: 99 % | SYSTOLIC BLOOD PRESSURE: 154 MMHG | RESPIRATION RATE: 13 BRPM | DIASTOLIC BLOOD PRESSURE: 72 MMHG | HEART RATE: 59 BPM

## 2024-05-08 VITALS
RESPIRATION RATE: 9 BRPM | SYSTOLIC BLOOD PRESSURE: 136 MMHG | HEART RATE: 64 BPM | OXYGEN SATURATION: 88 % | DIASTOLIC BLOOD PRESSURE: 80 MMHG

## 2024-05-08 VITALS
OXYGEN SATURATION: 99 % | DIASTOLIC BLOOD PRESSURE: 51 MMHG | SYSTOLIC BLOOD PRESSURE: 136 MMHG | HEART RATE: 50 BPM | RESPIRATION RATE: 11 BRPM

## 2024-05-08 VITALS
HEART RATE: 63 BPM | SYSTOLIC BLOOD PRESSURE: 143 MMHG | OXYGEN SATURATION: 89 % | DIASTOLIC BLOOD PRESSURE: 71 MMHG | RESPIRATION RATE: 10 BRPM

## 2024-05-08 VITALS
HEART RATE: 56 BPM | OXYGEN SATURATION: 96 % | DIASTOLIC BLOOD PRESSURE: 101 MMHG | RESPIRATION RATE: 9 BRPM | SYSTOLIC BLOOD PRESSURE: 126 MMHG

## 2024-05-08 VITALS
HEART RATE: 55 BPM | DIASTOLIC BLOOD PRESSURE: 66 MMHG | SYSTOLIC BLOOD PRESSURE: 145 MMHG | OXYGEN SATURATION: 99 % | RESPIRATION RATE: 10 BRPM

## 2024-05-08 VITALS
DIASTOLIC BLOOD PRESSURE: 67 MMHG | RESPIRATION RATE: 17 BRPM | HEART RATE: 67 BPM | OXYGEN SATURATION: 90 % | SYSTOLIC BLOOD PRESSURE: 125 MMHG

## 2024-05-08 VITALS
HEART RATE: 56 BPM | SYSTOLIC BLOOD PRESSURE: 140 MMHG | DIASTOLIC BLOOD PRESSURE: 67 MMHG | RESPIRATION RATE: 10 BRPM | OXYGEN SATURATION: 99 %

## 2024-05-08 VITALS
HEART RATE: 55 BPM | RESPIRATION RATE: 12 BRPM | OXYGEN SATURATION: 97 % | DIASTOLIC BLOOD PRESSURE: 47 MMHG | SYSTOLIC BLOOD PRESSURE: 98 MMHG

## 2024-05-08 VITALS
HEART RATE: 58 BPM | OXYGEN SATURATION: 98 % | SYSTOLIC BLOOD PRESSURE: 151 MMHG | TEMPERATURE: 97.9 F | RESPIRATION RATE: 10 BRPM | DIASTOLIC BLOOD PRESSURE: 70 MMHG

## 2024-05-08 VITALS
RESPIRATION RATE: 10 BRPM | OXYGEN SATURATION: 99 % | SYSTOLIC BLOOD PRESSURE: 104 MMHG | HEART RATE: 51 BPM | DIASTOLIC BLOOD PRESSURE: 42 MMHG

## 2024-05-08 VITALS — RESPIRATION RATE: 18 BRPM | OXYGEN SATURATION: 100 %

## 2024-05-08 VITALS
OXYGEN SATURATION: 98 % | RESPIRATION RATE: 10 BRPM | DIASTOLIC BLOOD PRESSURE: 60 MMHG | HEART RATE: 59 BPM | SYSTOLIC BLOOD PRESSURE: 150 MMHG

## 2024-05-08 VITALS — OXYGEN SATURATION: 100 % | RESPIRATION RATE: 12 BRPM | HEART RATE: 47 BPM

## 2024-05-08 VITALS — OXYGEN SATURATION: 100 % | RESPIRATION RATE: 18 BRPM | HEART RATE: 58 BPM

## 2024-05-08 DIAGNOSIS — F41.9: ICD-10-CM

## 2024-05-08 DIAGNOSIS — Z79.02: ICD-10-CM

## 2024-05-08 DIAGNOSIS — Z86.73: ICD-10-CM

## 2024-05-08 DIAGNOSIS — I25.2: ICD-10-CM

## 2024-05-08 DIAGNOSIS — E11.65: ICD-10-CM

## 2024-05-08 DIAGNOSIS — Z82.49: ICD-10-CM

## 2024-05-08 DIAGNOSIS — N17.0: ICD-10-CM

## 2024-05-08 DIAGNOSIS — I25.10: ICD-10-CM

## 2024-05-08 DIAGNOSIS — Z80.59: ICD-10-CM

## 2024-05-08 DIAGNOSIS — E78.5: ICD-10-CM

## 2024-05-08 DIAGNOSIS — Z81.1: ICD-10-CM

## 2024-05-08 DIAGNOSIS — N18.4: ICD-10-CM

## 2024-05-08 DIAGNOSIS — E11.22: ICD-10-CM

## 2024-05-08 DIAGNOSIS — Z81.8: ICD-10-CM

## 2024-05-08 DIAGNOSIS — Z79.899: ICD-10-CM

## 2024-05-08 DIAGNOSIS — I35.0: ICD-10-CM

## 2024-05-08 DIAGNOSIS — Z79.51: ICD-10-CM

## 2024-05-08 DIAGNOSIS — Z79.82: ICD-10-CM

## 2024-05-08 DIAGNOSIS — Z95.5: ICD-10-CM

## 2024-05-08 DIAGNOSIS — D63.1: ICD-10-CM

## 2024-05-08 DIAGNOSIS — F17.210: ICD-10-CM

## 2024-05-08 DIAGNOSIS — Z82.3: ICD-10-CM

## 2024-05-08 DIAGNOSIS — J96.10: ICD-10-CM

## 2024-05-08 DIAGNOSIS — I27.0: Primary | ICD-10-CM

## 2024-05-08 DIAGNOSIS — K21.9: ICD-10-CM

## 2024-05-08 DIAGNOSIS — G40.909: ICD-10-CM

## 2024-05-08 DIAGNOSIS — E55.9: ICD-10-CM

## 2024-05-08 DIAGNOSIS — K76.0: ICD-10-CM

## 2024-05-08 DIAGNOSIS — Z74.01: ICD-10-CM

## 2024-05-08 DIAGNOSIS — Z82.5: ICD-10-CM

## 2024-05-08 DIAGNOSIS — E66.01: ICD-10-CM

## 2024-05-08 DIAGNOSIS — E89.0: ICD-10-CM

## 2024-05-08 DIAGNOSIS — E87.6: ICD-10-CM

## 2024-05-08 DIAGNOSIS — Z90.49: ICD-10-CM

## 2024-05-08 DIAGNOSIS — Z83.3: ICD-10-CM

## 2024-05-08 DIAGNOSIS — F32.A: ICD-10-CM

## 2024-05-08 LAB
ALBUMIN SERPL-MCNC: 3.6 G/DL (ref 3.2–4.8)
ALP SERPL-CCNC: 57 U/L (ref 46–116)
ALT SERPL-CCNC: 11 U/L (ref 7–40)
ANION GAP SERPL CALCULATED.3IONS-SCNC: 9 MMOL/L (ref 5–15)
APTT PPP: 25 SEC (ref 24.5–34.5)
APTT PPP: 32.1 SEC (ref 24.5–34.5)
BILIRUB SERPL-MCNC: 0.3 MG/DL (ref 0.2–1)
BUN SERPL-MCNC: 34 MG/DL (ref 9–23)
BUN/CREAT SERPL: 14.9 (ref 10–20)
CALCIUM SERPL-MCNC: 9.4 MG/DL (ref 8.5–10.1)
CHLORIDE SERPL-SCNC: 106 MMOL/L (ref 98–107)
CO2 SERPL-SCNC: 27 MMOL/L (ref 20–30)
GLUCOSE SERPL-MCNC: 114 MG/DL (ref 74–106)
HCT VFR BLD AUTO: 36.9 % (ref 41–53)
HGB BLD-MCNC: 11.9 G/DL (ref 13.5–17.5)
INR PPP: 1.06 (ref 0.9–1.15)
INR PPP: 1.08 (ref 0.9–1.15)
MCH RBC QN AUTO: 28.6 PG (ref 28–32)
MCV RBC AUTO: 88.4 FL (ref 80–100)
NRBC BLD QL AUTO: 0 %
POTASSIUM SERPL-SCNC: 3.2 MMOL/L (ref 3.5–5.1)
PROT SERPL-MCNC: 6.6 G/DL (ref 5.7–8.2)
PROTHROMBIN TIME: 11.2 SEC (ref 9.3–11.8)
PROTHROMBIN TIME: 11.4 SEC (ref 9.3–11.8)
SODIUM SERPL-SCNC: 142 MMOL/L (ref 136–145)
URATE SERPL-MCNC: 15 MG/DL (ref 3.7–9.2)

## 2024-05-08 PROCEDURE — 87088 URINE BACTERIA CULTURE: CPT

## 2024-05-08 PROCEDURE — 84484 ASSAY OF TROPONIN QUANT: CPT

## 2024-05-08 PROCEDURE — 83970 ASSAY OF PARATHORMONE: CPT

## 2024-05-08 PROCEDURE — 93970 EXTREMITY STUDY: CPT

## 2024-05-08 PROCEDURE — 83036 HEMOGLOBIN GLYCOSYLATED A1C: CPT

## 2024-05-08 PROCEDURE — 93306 TTE W/DOPPLER COMPLETE: CPT

## 2024-05-08 PROCEDURE — 80048 BASIC METABOLIC PNL TOTAL CA: CPT

## 2024-05-08 PROCEDURE — 87086 URINE CULTURE/COLONY COUNT: CPT

## 2024-05-08 PROCEDURE — 99291 CRITICAL CARE FIRST HOUR: CPT

## 2024-05-08 PROCEDURE — 71275 CT ANGIOGRAPHY CHEST: CPT

## 2024-05-08 PROCEDURE — 96376 TX/PRO/DX INJ SAME DRUG ADON: CPT

## 2024-05-08 PROCEDURE — 85025 COMPLETE CBC W/AUTO DIFF WBC: CPT

## 2024-05-08 PROCEDURE — 85610 PROTHROMBIN TIME: CPT

## 2024-05-08 PROCEDURE — 84550 ASSAY OF BLOOD/URIC ACID: CPT

## 2024-05-08 PROCEDURE — 81001 URINALYSIS AUTO W/SCOPE: CPT

## 2024-05-08 PROCEDURE — 83735 ASSAY OF MAGNESIUM: CPT

## 2024-05-08 PROCEDURE — 36415 COLL VENOUS BLD VENIPUNCTURE: CPT

## 2024-05-08 PROCEDURE — 84156 ASSAY OF PROTEIN URINE: CPT

## 2024-05-08 PROCEDURE — 02HV33Z INSERTION OF INFUSION DEVICE INTO SUPERIOR VENA CAVA, PERCUTANEOUS APPROACH: ICD-10-PCS | Performed by: EMERGENCY MEDICINE

## 2024-05-08 PROCEDURE — 76775 US EXAM ABDO BACK WALL LIM: CPT

## 2024-05-08 PROCEDURE — 84100 ASSAY OF PHOSPHORUS: CPT

## 2024-05-08 PROCEDURE — B548ZZA ULTRASONOGRAPHY OF SUPERIOR VENA CAVA, GUIDANCE: ICD-10-PCS | Performed by: EMERGENCY MEDICINE

## 2024-05-08 PROCEDURE — 96375 TX/PRO/DX INJ NEW DRUG ADDON: CPT

## 2024-05-08 PROCEDURE — 85730 THROMBOPLASTIN TIME PARTIAL: CPT

## 2024-05-08 PROCEDURE — 97163 PT EVAL HIGH COMPLEX 45 MIN: CPT

## 2024-05-08 PROCEDURE — 84132 ASSAY OF SERUM POTASSIUM: CPT

## 2024-05-08 PROCEDURE — 96367 TX/PROPH/DG ADDL SEQ IV INF: CPT

## 2024-05-08 PROCEDURE — 83880 ASSAY OF NATRIURETIC PEPTIDE: CPT

## 2024-05-08 PROCEDURE — 36600 WITHDRAWAL OF ARTERIAL BLOOD: CPT

## 2024-05-08 PROCEDURE — 96365 THER/PROPH/DIAG IV INF INIT: CPT

## 2024-05-08 PROCEDURE — 82805 BLOOD GASES W/O2 SATURATION: CPT

## 2024-05-08 PROCEDURE — 84300 ASSAY OF URINE SODIUM: CPT

## 2024-05-08 PROCEDURE — 85379 FIBRIN DEGRADATION QUANT: CPT

## 2024-05-08 PROCEDURE — 82306 VITAMIN D 25 HYDROXY: CPT

## 2024-05-08 PROCEDURE — 93005 ELECTROCARDIOGRAM TRACING: CPT

## 2024-05-08 PROCEDURE — 82962 GLUCOSE BLOOD TEST: CPT

## 2024-05-08 PROCEDURE — 71045 X-RAY EXAM CHEST 1 VIEW: CPT

## 2024-05-08 PROCEDURE — 82570 ASSAY OF URINE CREATININE: CPT

## 2024-05-08 PROCEDURE — 80053 COMPREHEN METABOLIC PANEL: CPT

## 2024-05-08 RX ADMIN — HEPARIN SODIUM AND DEXTROSE SCH MLS/HR: 10000; 5 INJECTION INTRAVENOUS at 12:40

## 2024-05-08 RX ADMIN — ATORVASTATIN CALCIUM SCH MG: 20 TABLET, FILM COATED ORAL at 22:03

## 2024-05-08 RX ADMIN — MORPHINE SULFATE PRN MG: 2 INJECTION, SOLUTION INTRAMUSCULAR; INTRAVENOUS at 08:45

## 2024-05-08 RX ADMIN — ONDANSETRON HYDROCHLORIDE ONE MG: 2 INJECTION, SOLUTION INTRAMUSCULAR; INTRAVENOUS at 02:10

## 2024-05-08 RX ADMIN — GLUCAGON ONE MG: KIT at 03:23

## 2024-05-08 RX ADMIN — MORPHINE SULFATE ONE MG: 4 INJECTION, SOLUTION INTRAMUSCULAR; INTRAVENOUS at 06:19

## 2024-05-08 RX ADMIN — POTASSIUM CHLORIDE ONE MEQ: 1500 TABLET, EXTENDED RELEASE ORAL at 22:03

## 2024-05-08 RX ADMIN — DOPAMINE HYDROCHLORIDE IN DEXTROSE SCH MLS/HR: 1.6 INJECTION, SOLUTION INTRAVENOUS at 09:31

## 2024-05-08 RX ADMIN — HEPARIN SODIUM AND DEXTROSE SCH MLS/HR: 10000; 5 INJECTION INTRAVENOUS at 19:49

## 2024-05-08 RX ADMIN — ENOXAPARIN SODIUM SCH MG: 40 INJECTION SUBCUTANEOUS at 10:00

## 2024-05-08 RX ADMIN — HYDROCODONE BITARTRATE AND ACETAMINOPHEN PRN TAB: 5; 325 TABLET ORAL at 12:26

## 2024-05-08 RX ADMIN — DOPAMINE HYDROCHLORIDE IN DEXTROSE ONE MLS/HR: 1.6 INJECTION, SOLUTION INTRAVENOUS at 09:53

## 2024-05-08 RX ADMIN — ONDANSETRON HYDROCHLORIDE ONE MG: 2 INJECTION, SOLUTION INTRAMUSCULAR; INTRAVENOUS at 06:20

## 2024-05-08 RX ADMIN — GABAPENTIN SCH MG: 300 CAPSULE ORAL at 22:03

## 2024-05-08 RX ADMIN — MORPHINE SULFATE ONE MG: 4 INJECTION, SOLUTION INTRAMUSCULAR; INTRAVENOUS at 02:04

## 2024-05-08 RX ADMIN — DEXTROSE ONE UNITS: 5 SOLUTION INTRAVENOUS at 20:13

## 2024-05-08 RX ADMIN — CLOPIDOGREL BISULFATE ONE MG: 75 TABLET ORAL at 12:20

## 2024-05-08 RX ADMIN — DIPHENHYDRAMINE HYDROCHLORIDE ONE MG: 25 CAPSULE ORAL at 06:22

## 2024-05-08 RX ADMIN — DOPAMINE HYDROCHLORIDE IN DEXTROSE ONE MLS/HR: 1.6 INJECTION, SOLUTION INTRAVENOUS at 04:30

## 2024-05-09 VITALS — RESPIRATION RATE: 12 BRPM | OXYGEN SATURATION: 97 % | HEART RATE: 58 BPM

## 2024-05-09 VITALS
SYSTOLIC BLOOD PRESSURE: 118 MMHG | HEART RATE: 57 BPM | RESPIRATION RATE: 17 BRPM | OXYGEN SATURATION: 94 % | DIASTOLIC BLOOD PRESSURE: 50 MMHG

## 2024-05-09 VITALS
HEART RATE: 55 BPM | DIASTOLIC BLOOD PRESSURE: 62 MMHG | OXYGEN SATURATION: 100 % | SYSTOLIC BLOOD PRESSURE: 108 MMHG | RESPIRATION RATE: 10 BRPM

## 2024-05-09 VITALS
RESPIRATION RATE: 9 BRPM | DIASTOLIC BLOOD PRESSURE: 44 MMHG | SYSTOLIC BLOOD PRESSURE: 113 MMHG | HEART RATE: 52 BPM | OXYGEN SATURATION: 99 %

## 2024-05-09 VITALS
OXYGEN SATURATION: 98 % | HEART RATE: 58 BPM | RESPIRATION RATE: 17 BRPM | SYSTOLIC BLOOD PRESSURE: 141 MMHG | DIASTOLIC BLOOD PRESSURE: 84 MMHG

## 2024-05-09 VITALS
HEART RATE: 49 BPM | SYSTOLIC BLOOD PRESSURE: 120 MMHG | OXYGEN SATURATION: 99 % | RESPIRATION RATE: 10 BRPM | DIASTOLIC BLOOD PRESSURE: 42 MMHG

## 2024-05-09 VITALS
SYSTOLIC BLOOD PRESSURE: 75 MMHG | DIASTOLIC BLOOD PRESSURE: 38 MMHG | HEART RATE: 55 BPM | RESPIRATION RATE: 9 BRPM | OXYGEN SATURATION: 99 %

## 2024-05-09 VITALS
DIASTOLIC BLOOD PRESSURE: 34 MMHG | SYSTOLIC BLOOD PRESSURE: 107 MMHG | OXYGEN SATURATION: 99 % | RESPIRATION RATE: 10 BRPM | HEART RATE: 50 BPM

## 2024-05-09 VITALS — HEART RATE: 57 BPM | OXYGEN SATURATION: 100 % | RESPIRATION RATE: 10 BRPM

## 2024-05-09 VITALS — HEART RATE: 58 BPM | RESPIRATION RATE: 11 BRPM | OXYGEN SATURATION: 100 %

## 2024-05-09 VITALS
DIASTOLIC BLOOD PRESSURE: 52 MMHG | RESPIRATION RATE: 13 BRPM | HEART RATE: 60 BPM | SYSTOLIC BLOOD PRESSURE: 132 MMHG | OXYGEN SATURATION: 97 %

## 2024-05-09 VITALS
HEART RATE: 55 BPM | TEMPERATURE: 98.2 F | RESPIRATION RATE: 10 BRPM | SYSTOLIC BLOOD PRESSURE: 147 MMHG | OXYGEN SATURATION: 98 % | DIASTOLIC BLOOD PRESSURE: 96 MMHG

## 2024-05-09 VITALS
OXYGEN SATURATION: 99 % | DIASTOLIC BLOOD PRESSURE: 34 MMHG | RESPIRATION RATE: 10 BRPM | SYSTOLIC BLOOD PRESSURE: 109 MMHG | HEART RATE: 52 BPM

## 2024-05-09 VITALS
OXYGEN SATURATION: 100 % | RESPIRATION RATE: 10 BRPM | HEART RATE: 50 BPM | SYSTOLIC BLOOD PRESSURE: 109 MMHG | DIASTOLIC BLOOD PRESSURE: 34 MMHG

## 2024-05-09 VITALS
SYSTOLIC BLOOD PRESSURE: 109 MMHG | OXYGEN SATURATION: 100 % | HEART RATE: 54 BPM | DIASTOLIC BLOOD PRESSURE: 35 MMHG | RESPIRATION RATE: 11 BRPM

## 2024-05-09 VITALS
SYSTOLIC BLOOD PRESSURE: 80 MMHG | DIASTOLIC BLOOD PRESSURE: 44 MMHG | OXYGEN SATURATION: 97 % | RESPIRATION RATE: 10 BRPM | HEART RATE: 52 BPM

## 2024-05-09 VITALS
RESPIRATION RATE: 15 BRPM | OXYGEN SATURATION: 99 % | HEART RATE: 60 BPM | DIASTOLIC BLOOD PRESSURE: 49 MMHG | SYSTOLIC BLOOD PRESSURE: 130 MMHG

## 2024-05-09 VITALS
RESPIRATION RATE: 11 BRPM | HEART RATE: 56 BPM | SYSTOLIC BLOOD PRESSURE: 102 MMHG | OXYGEN SATURATION: 99 % | DIASTOLIC BLOOD PRESSURE: 45 MMHG

## 2024-05-09 VITALS
SYSTOLIC BLOOD PRESSURE: 86 MMHG | OXYGEN SATURATION: 99 % | HEART RATE: 59 BPM | DIASTOLIC BLOOD PRESSURE: 45 MMHG | RESPIRATION RATE: 12 BRPM

## 2024-05-09 VITALS
HEART RATE: 47 BPM | RESPIRATION RATE: 11 BRPM | DIASTOLIC BLOOD PRESSURE: 54 MMHG | SYSTOLIC BLOOD PRESSURE: 134 MMHG | OXYGEN SATURATION: 100 %

## 2024-05-09 VITALS
DIASTOLIC BLOOD PRESSURE: 62 MMHG | OXYGEN SATURATION: 99 % | HEART RATE: 51 BPM | RESPIRATION RATE: 9 BRPM | SYSTOLIC BLOOD PRESSURE: 122 MMHG

## 2024-05-09 VITALS
SYSTOLIC BLOOD PRESSURE: 118 MMHG | DIASTOLIC BLOOD PRESSURE: 48 MMHG | OXYGEN SATURATION: 99 % | RESPIRATION RATE: 10 BRPM | HEART RATE: 56 BPM

## 2024-05-09 VITALS
HEART RATE: 53 BPM | OXYGEN SATURATION: 100 % | SYSTOLIC BLOOD PRESSURE: 110 MMHG | RESPIRATION RATE: 10 BRPM | DIASTOLIC BLOOD PRESSURE: 50 MMHG

## 2024-05-09 VITALS
SYSTOLIC BLOOD PRESSURE: 111 MMHG | HEART RATE: 52 BPM | OXYGEN SATURATION: 99 % | RESPIRATION RATE: 9 BRPM | DIASTOLIC BLOOD PRESSURE: 47 MMHG

## 2024-05-09 VITALS
SYSTOLIC BLOOD PRESSURE: 93 MMHG | RESPIRATION RATE: 12 BRPM | DIASTOLIC BLOOD PRESSURE: 63 MMHG | OXYGEN SATURATION: 98 % | HEART RATE: 52 BPM

## 2024-05-09 VITALS
DIASTOLIC BLOOD PRESSURE: 42 MMHG | RESPIRATION RATE: 11 BRPM | HEART RATE: 52 BPM | SYSTOLIC BLOOD PRESSURE: 101 MMHG | OXYGEN SATURATION: 100 %

## 2024-05-09 VITALS
OXYGEN SATURATION: 98 % | HEART RATE: 58 BPM | RESPIRATION RATE: 13 BRPM | SYSTOLIC BLOOD PRESSURE: 130 MMHG | DIASTOLIC BLOOD PRESSURE: 49 MMHG

## 2024-05-09 VITALS
SYSTOLIC BLOOD PRESSURE: 125 MMHG | HEART RATE: 51 BPM | OXYGEN SATURATION: 99 % | DIASTOLIC BLOOD PRESSURE: 67 MMHG | RESPIRATION RATE: 9 BRPM

## 2024-05-09 VITALS
DIASTOLIC BLOOD PRESSURE: 50 MMHG | RESPIRATION RATE: 9 BRPM | OXYGEN SATURATION: 97 % | HEART RATE: 54 BPM | SYSTOLIC BLOOD PRESSURE: 119 MMHG

## 2024-05-09 VITALS
OXYGEN SATURATION: 99 % | RESPIRATION RATE: 10 BRPM | HEART RATE: 51 BPM | DIASTOLIC BLOOD PRESSURE: 46 MMHG | SYSTOLIC BLOOD PRESSURE: 115 MMHG

## 2024-05-09 VITALS
SYSTOLIC BLOOD PRESSURE: 103 MMHG | HEART RATE: 53 BPM | OXYGEN SATURATION: 99 % | RESPIRATION RATE: 11 BRPM | DIASTOLIC BLOOD PRESSURE: 58 MMHG

## 2024-05-09 VITALS
HEART RATE: 52 BPM | TEMPERATURE: 97.3 F | OXYGEN SATURATION: 100 % | SYSTOLIC BLOOD PRESSURE: 101 MMHG | DIASTOLIC BLOOD PRESSURE: 65 MMHG | RESPIRATION RATE: 12 BRPM

## 2024-05-09 VITALS
DIASTOLIC BLOOD PRESSURE: 56 MMHG | SYSTOLIC BLOOD PRESSURE: 123 MMHG | HEART RATE: 54 BPM | OXYGEN SATURATION: 100 % | RESPIRATION RATE: 9 BRPM

## 2024-05-09 VITALS
SYSTOLIC BLOOD PRESSURE: 121 MMHG | DIASTOLIC BLOOD PRESSURE: 59 MMHG | RESPIRATION RATE: 9 BRPM | HEART RATE: 51 BPM | OXYGEN SATURATION: 100 %

## 2024-05-09 VITALS
RESPIRATION RATE: 17 BRPM | SYSTOLIC BLOOD PRESSURE: 138 MMHG | DIASTOLIC BLOOD PRESSURE: 58 MMHG | HEART RATE: 60 BPM | OXYGEN SATURATION: 98 %

## 2024-05-09 VITALS
HEART RATE: 58 BPM | OXYGEN SATURATION: 96 % | SYSTOLIC BLOOD PRESSURE: 106 MMHG | DIASTOLIC BLOOD PRESSURE: 61 MMHG | RESPIRATION RATE: 9 BRPM

## 2024-05-09 VITALS
DIASTOLIC BLOOD PRESSURE: 55 MMHG | HEART RATE: 51 BPM | SYSTOLIC BLOOD PRESSURE: 88 MMHG | OXYGEN SATURATION: 100 % | RESPIRATION RATE: 9 BRPM

## 2024-05-09 VITALS
SYSTOLIC BLOOD PRESSURE: 121 MMHG | OXYGEN SATURATION: 96 % | RESPIRATION RATE: 17 BRPM | DIASTOLIC BLOOD PRESSURE: 77 MMHG | HEART RATE: 52 BPM

## 2024-05-09 VITALS
HEART RATE: 55 BPM | OXYGEN SATURATION: 98 % | DIASTOLIC BLOOD PRESSURE: 65 MMHG | RESPIRATION RATE: 8 BRPM | SYSTOLIC BLOOD PRESSURE: 101 MMHG

## 2024-05-09 VITALS
OXYGEN SATURATION: 95 % | SYSTOLIC BLOOD PRESSURE: 78 MMHG | HEART RATE: 51 BPM | DIASTOLIC BLOOD PRESSURE: 32 MMHG | RESPIRATION RATE: 15 BRPM

## 2024-05-09 VITALS
HEART RATE: 52 BPM | OXYGEN SATURATION: 100 % | SYSTOLIC BLOOD PRESSURE: 125 MMHG | DIASTOLIC BLOOD PRESSURE: 58 MMHG | RESPIRATION RATE: 9 BRPM | TEMPERATURE: 98 F

## 2024-05-09 VITALS
SYSTOLIC BLOOD PRESSURE: 107 MMHG | DIASTOLIC BLOOD PRESSURE: 48 MMHG | OXYGEN SATURATION: 99 % | HEART RATE: 50 BPM | RESPIRATION RATE: 10 BRPM

## 2024-05-09 VITALS
SYSTOLIC BLOOD PRESSURE: 103 MMHG | OXYGEN SATURATION: 97 % | HEART RATE: 57 BPM | DIASTOLIC BLOOD PRESSURE: 40 MMHG | RESPIRATION RATE: 9 BRPM

## 2024-05-09 VITALS
DIASTOLIC BLOOD PRESSURE: 63 MMHG | RESPIRATION RATE: 12 BRPM | OXYGEN SATURATION: 99 % | SYSTOLIC BLOOD PRESSURE: 114 MMHG | HEART RATE: 53 BPM

## 2024-05-09 VITALS
SYSTOLIC BLOOD PRESSURE: 114 MMHG | DIASTOLIC BLOOD PRESSURE: 64 MMHG | HEART RATE: 52 BPM | RESPIRATION RATE: 9 BRPM | OXYGEN SATURATION: 100 %

## 2024-05-09 VITALS
DIASTOLIC BLOOD PRESSURE: 57 MMHG | SYSTOLIC BLOOD PRESSURE: 119 MMHG | OXYGEN SATURATION: 99 % | HEART RATE: 52 BPM | RESPIRATION RATE: 10 BRPM

## 2024-05-09 VITALS
HEART RATE: 58 BPM | DIASTOLIC BLOOD PRESSURE: 65 MMHG | TEMPERATURE: 98.3 F | SYSTOLIC BLOOD PRESSURE: 151 MMHG | OXYGEN SATURATION: 99 % | RESPIRATION RATE: 11 BRPM

## 2024-05-09 VITALS
SYSTOLIC BLOOD PRESSURE: 110 MMHG | HEART RATE: 52 BPM | RESPIRATION RATE: 9 BRPM | DIASTOLIC BLOOD PRESSURE: 44 MMHG | OXYGEN SATURATION: 100 %

## 2024-05-09 VITALS
HEART RATE: 61 BPM | OXYGEN SATURATION: 96 % | SYSTOLIC BLOOD PRESSURE: 149 MMHG | RESPIRATION RATE: 10 BRPM | DIASTOLIC BLOOD PRESSURE: 128 MMHG

## 2024-05-09 VITALS
RESPIRATION RATE: 8 BRPM | HEART RATE: 53 BPM | SYSTOLIC BLOOD PRESSURE: 125 MMHG | DIASTOLIC BLOOD PRESSURE: 49 MMHG | OXYGEN SATURATION: 99 %

## 2024-05-09 VITALS
HEART RATE: 58 BPM | SYSTOLIC BLOOD PRESSURE: 153 MMHG | RESPIRATION RATE: 10 BRPM | OXYGEN SATURATION: 98 % | DIASTOLIC BLOOD PRESSURE: 68 MMHG

## 2024-05-09 VITALS
OXYGEN SATURATION: 99 % | HEART RATE: 48 BPM | SYSTOLIC BLOOD PRESSURE: 56 MMHG | DIASTOLIC BLOOD PRESSURE: 22 MMHG | RESPIRATION RATE: 10 BRPM

## 2024-05-09 VITALS
SYSTOLIC BLOOD PRESSURE: 144 MMHG | RESPIRATION RATE: 11 BRPM | DIASTOLIC BLOOD PRESSURE: 46 MMHG | HEART RATE: 57 BPM | OXYGEN SATURATION: 98 %

## 2024-05-09 VITALS
DIASTOLIC BLOOD PRESSURE: 36 MMHG | OXYGEN SATURATION: 100 % | SYSTOLIC BLOOD PRESSURE: 126 MMHG | HEART RATE: 52 BPM | RESPIRATION RATE: 8 BRPM

## 2024-05-09 VITALS
DIASTOLIC BLOOD PRESSURE: 47 MMHG | HEART RATE: 55 BPM | OXYGEN SATURATION: 98 % | RESPIRATION RATE: 15 BRPM | SYSTOLIC BLOOD PRESSURE: 106 MMHG

## 2024-05-09 VITALS
SYSTOLIC BLOOD PRESSURE: 120 MMHG | HEART RATE: 51 BPM | DIASTOLIC BLOOD PRESSURE: 40 MMHG | OXYGEN SATURATION: 99 % | RESPIRATION RATE: 11 BRPM

## 2024-05-09 VITALS
OXYGEN SATURATION: 100 % | HEART RATE: 47 BPM | DIASTOLIC BLOOD PRESSURE: 39 MMHG | RESPIRATION RATE: 10 BRPM | SYSTOLIC BLOOD PRESSURE: 110 MMHG

## 2024-05-09 VITALS
SYSTOLIC BLOOD PRESSURE: 108 MMHG | HEART RATE: 52 BPM | DIASTOLIC BLOOD PRESSURE: 38 MMHG | RESPIRATION RATE: 10 BRPM | OXYGEN SATURATION: 99 %

## 2024-05-09 VITALS
DIASTOLIC BLOOD PRESSURE: 56 MMHG | RESPIRATION RATE: 10 BRPM | OXYGEN SATURATION: 100 % | SYSTOLIC BLOOD PRESSURE: 125 MMHG | HEART RATE: 52 BPM

## 2024-05-09 VITALS
RESPIRATION RATE: 12 BRPM | DIASTOLIC BLOOD PRESSURE: 61 MMHG | SYSTOLIC BLOOD PRESSURE: 124 MMHG | OXYGEN SATURATION: 96 % | HEART RATE: 57 BPM

## 2024-05-09 VITALS — RESPIRATION RATE: 9 BRPM | HEART RATE: 57 BPM | OXYGEN SATURATION: 96 %

## 2024-05-09 VITALS
SYSTOLIC BLOOD PRESSURE: 134 MMHG | RESPIRATION RATE: 10 BRPM | HEART RATE: 56 BPM | DIASTOLIC BLOOD PRESSURE: 37 MMHG | OXYGEN SATURATION: 98 %

## 2024-05-09 VITALS
DIASTOLIC BLOOD PRESSURE: 26 MMHG | SYSTOLIC BLOOD PRESSURE: 54 MMHG | OXYGEN SATURATION: 97 % | RESPIRATION RATE: 9 BRPM | HEART RATE: 119 BPM

## 2024-05-09 VITALS
DIASTOLIC BLOOD PRESSURE: 30 MMHG | RESPIRATION RATE: 26 BRPM | SYSTOLIC BLOOD PRESSURE: 119 MMHG | HEART RATE: 57 BPM | OXYGEN SATURATION: 98 %

## 2024-05-09 VITALS
SYSTOLIC BLOOD PRESSURE: 115 MMHG | HEART RATE: 50 BPM | RESPIRATION RATE: 10 BRPM | DIASTOLIC BLOOD PRESSURE: 40 MMHG | OXYGEN SATURATION: 99 %

## 2024-05-09 VITALS
HEART RATE: 57 BPM | RESPIRATION RATE: 10 BRPM | OXYGEN SATURATION: 98 % | SYSTOLIC BLOOD PRESSURE: 148 MMHG | DIASTOLIC BLOOD PRESSURE: 64 MMHG

## 2024-05-09 VITALS
HEART RATE: 48 BPM | DIASTOLIC BLOOD PRESSURE: 26 MMHG | OXYGEN SATURATION: 100 % | RESPIRATION RATE: 8 BRPM | SYSTOLIC BLOOD PRESSURE: 62 MMHG

## 2024-05-09 VITALS
SYSTOLIC BLOOD PRESSURE: 112 MMHG | HEART RATE: 54 BPM | DIASTOLIC BLOOD PRESSURE: 56 MMHG | OXYGEN SATURATION: 98 % | RESPIRATION RATE: 9 BRPM

## 2024-05-09 VITALS
HEART RATE: 51 BPM | DIASTOLIC BLOOD PRESSURE: 65 MMHG | OXYGEN SATURATION: 99 % | RESPIRATION RATE: 9 BRPM | SYSTOLIC BLOOD PRESSURE: 122 MMHG

## 2024-05-09 VITALS
DIASTOLIC BLOOD PRESSURE: 58 MMHG | OXYGEN SATURATION: 98 % | SYSTOLIC BLOOD PRESSURE: 80 MMHG | HEART RATE: 57 BPM | RESPIRATION RATE: 7 BRPM

## 2024-05-09 VITALS
DIASTOLIC BLOOD PRESSURE: 54 MMHG | HEART RATE: 52 BPM | OXYGEN SATURATION: 99 % | SYSTOLIC BLOOD PRESSURE: 126 MMHG | RESPIRATION RATE: 9 BRPM

## 2024-05-09 VITALS
OXYGEN SATURATION: 98 % | SYSTOLIC BLOOD PRESSURE: 110 MMHG | RESPIRATION RATE: 13 BRPM | DIASTOLIC BLOOD PRESSURE: 34 MMHG | HEART RATE: 55 BPM

## 2024-05-09 VITALS
DIASTOLIC BLOOD PRESSURE: 57 MMHG | RESPIRATION RATE: 9 BRPM | OXYGEN SATURATION: 97 % | HEART RATE: 53 BPM | SYSTOLIC BLOOD PRESSURE: 122 MMHG

## 2024-05-09 VITALS
RESPIRATION RATE: 12 BRPM | SYSTOLIC BLOOD PRESSURE: 127 MMHG | OXYGEN SATURATION: 99 % | DIASTOLIC BLOOD PRESSURE: 45 MMHG | HEART RATE: 56 BPM

## 2024-05-09 VITALS
HEART RATE: 53 BPM | SYSTOLIC BLOOD PRESSURE: 103 MMHG | DIASTOLIC BLOOD PRESSURE: 43 MMHG | RESPIRATION RATE: 8 BRPM | OXYGEN SATURATION: 98 %

## 2024-05-09 VITALS
RESPIRATION RATE: 8 BRPM | SYSTOLIC BLOOD PRESSURE: 100 MMHG | OXYGEN SATURATION: 99 % | HEART RATE: 56 BPM | DIASTOLIC BLOOD PRESSURE: 40 MMHG

## 2024-05-09 VITALS
SYSTOLIC BLOOD PRESSURE: 104 MMHG | HEART RATE: 52 BPM | DIASTOLIC BLOOD PRESSURE: 60 MMHG | TEMPERATURE: 97.7 F | OXYGEN SATURATION: 99 % | RESPIRATION RATE: 8 BRPM

## 2024-05-09 VITALS
SYSTOLIC BLOOD PRESSURE: 110 MMHG | HEART RATE: 52 BPM | RESPIRATION RATE: 10 BRPM | DIASTOLIC BLOOD PRESSURE: 42 MMHG | OXYGEN SATURATION: 99 %

## 2024-05-09 VITALS
OXYGEN SATURATION: 98 % | RESPIRATION RATE: 9 BRPM | DIASTOLIC BLOOD PRESSURE: 45 MMHG | HEART RATE: 51 BPM | SYSTOLIC BLOOD PRESSURE: 129 MMHG

## 2024-05-09 VITALS
SYSTOLIC BLOOD PRESSURE: 76 MMHG | HEART RATE: 55 BPM | DIASTOLIC BLOOD PRESSURE: 38 MMHG | OXYGEN SATURATION: 96 % | RESPIRATION RATE: 9 BRPM

## 2024-05-09 VITALS
OXYGEN SATURATION: 100 % | DIASTOLIC BLOOD PRESSURE: 42 MMHG | HEART RATE: 55 BPM | RESPIRATION RATE: 10 BRPM | SYSTOLIC BLOOD PRESSURE: 105 MMHG

## 2024-05-09 VITALS
OXYGEN SATURATION: 97 % | SYSTOLIC BLOOD PRESSURE: 136 MMHG | DIASTOLIC BLOOD PRESSURE: 44 MMHG | HEART RATE: 56 BPM | RESPIRATION RATE: 11 BRPM

## 2024-05-09 VITALS
RESPIRATION RATE: 15 BRPM | DIASTOLIC BLOOD PRESSURE: 42 MMHG | HEART RATE: 57 BPM | SYSTOLIC BLOOD PRESSURE: 64 MMHG | OXYGEN SATURATION: 98 %

## 2024-05-09 VITALS — HEART RATE: 52 BPM | OXYGEN SATURATION: 98 % | RESPIRATION RATE: 10 BRPM

## 2024-05-09 VITALS
DIASTOLIC BLOOD PRESSURE: 50 MMHG | OXYGEN SATURATION: 100 % | RESPIRATION RATE: 13 BRPM | SYSTOLIC BLOOD PRESSURE: 101 MMHG | HEART RATE: 55 BPM

## 2024-05-09 VITALS
RESPIRATION RATE: 10 BRPM | SYSTOLIC BLOOD PRESSURE: 119 MMHG | DIASTOLIC BLOOD PRESSURE: 63 MMHG | HEART RATE: 51 BPM | OXYGEN SATURATION: 100 %

## 2024-05-09 VITALS
DIASTOLIC BLOOD PRESSURE: 46 MMHG | SYSTOLIC BLOOD PRESSURE: 113 MMHG | OXYGEN SATURATION: 98 % | HEART RATE: 51 BPM | RESPIRATION RATE: 8 BRPM

## 2024-05-09 VITALS
DIASTOLIC BLOOD PRESSURE: 35 MMHG | SYSTOLIC BLOOD PRESSURE: 108 MMHG | RESPIRATION RATE: 10 BRPM | OXYGEN SATURATION: 99 % | HEART RATE: 48 BPM

## 2024-05-09 VITALS — HEART RATE: 56 BPM | OXYGEN SATURATION: 98 % | RESPIRATION RATE: 10 BRPM

## 2024-05-09 VITALS
HEART RATE: 51 BPM | OXYGEN SATURATION: 99 % | SYSTOLIC BLOOD PRESSURE: 119 MMHG | DIASTOLIC BLOOD PRESSURE: 66 MMHG | RESPIRATION RATE: 9 BRPM

## 2024-05-09 VITALS
DIASTOLIC BLOOD PRESSURE: 56 MMHG | RESPIRATION RATE: 9 BRPM | SYSTOLIC BLOOD PRESSURE: 101 MMHG | HEART RATE: 53 BPM | OXYGEN SATURATION: 98 %

## 2024-05-09 VITALS
OXYGEN SATURATION: 97 % | RESPIRATION RATE: 9 BRPM | HEART RATE: 52 BPM | DIASTOLIC BLOOD PRESSURE: 50 MMHG | SYSTOLIC BLOOD PRESSURE: 113 MMHG

## 2024-05-09 VITALS
OXYGEN SATURATION: 100 % | RESPIRATION RATE: 13 BRPM | DIASTOLIC BLOOD PRESSURE: 54 MMHG | HEART RATE: 47 BPM | SYSTOLIC BLOOD PRESSURE: 134 MMHG

## 2024-05-09 VITALS
OXYGEN SATURATION: 100 % | DIASTOLIC BLOOD PRESSURE: 60 MMHG | SYSTOLIC BLOOD PRESSURE: 120 MMHG | HEART RATE: 52 BPM | RESPIRATION RATE: 9 BRPM

## 2024-05-09 VITALS
DIASTOLIC BLOOD PRESSURE: 54 MMHG | HEART RATE: 52 BPM | SYSTOLIC BLOOD PRESSURE: 81 MMHG | RESPIRATION RATE: 9 BRPM | OXYGEN SATURATION: 100 %

## 2024-05-09 VITALS
SYSTOLIC BLOOD PRESSURE: 114 MMHG | OXYGEN SATURATION: 100 % | HEART RATE: 51 BPM | RESPIRATION RATE: 10 BRPM | DIASTOLIC BLOOD PRESSURE: 43 MMHG

## 2024-05-09 VITALS
HEART RATE: 63 BPM | DIASTOLIC BLOOD PRESSURE: 58 MMHG | SYSTOLIC BLOOD PRESSURE: 150 MMHG | OXYGEN SATURATION: 98 % | RESPIRATION RATE: 9 BRPM

## 2024-05-09 VITALS
SYSTOLIC BLOOD PRESSURE: 109 MMHG | RESPIRATION RATE: 7 BRPM | HEART RATE: 55 BPM | DIASTOLIC BLOOD PRESSURE: 83 MMHG | OXYGEN SATURATION: 97 %

## 2024-05-09 LAB
ALBUMIN SERPL-MCNC: 3.8 G/DL (ref 3.2–4.8)
ALP SERPL-CCNC: 53 U/L (ref 46–116)
ALT SERPL-CCNC: < 9 U/L (ref 7–40)
ANION GAP SERPL CALCULATED.3IONS-SCNC: 8 MMOL/L (ref 5–15)
APTT PPP: 48.3 SEC (ref 24.5–34.5)
APTT PPP: 99 SEC (ref 24.5–34.5)
APTT PPP: > 139 SEC (ref 24.5–34.5)
BILIRUB SERPL-MCNC: 0.4 MG/DL (ref 0.2–1)
BUN SERPL-MCNC: 36 MG/DL (ref 9–23)
BUN/CREAT SERPL: 14.1 (ref 10–20)
CALCIUM SERPL-MCNC: 9.4 MG/DL (ref 8.7–10.4)
CHLORIDE SERPL-SCNC: 100 MMOL/L (ref 98–107)
CO2 SERPL-SCNC: 28 MMOL/L (ref 20–30)
GLUCOSE SERPL-MCNC: 169 MG/DL (ref 74–106)
HCT VFR BLD AUTO: 37.8 % (ref 41–53)
HGB BLD-MCNC: 12.1 G/DL (ref 13.5–17.5)
INR PPP: 1.07 (ref 0.9–1.15)
INR PPP: 1.11 (ref 0.9–1.15)
INR PPP: 1.13 (ref 0.9–1.15)
MCH RBC QN AUTO: 28.5 PG (ref 28–32)
MCV RBC AUTO: 88.8 FL (ref 80–100)
NRBC BLD QL AUTO: 0 %
POTASSIUM SERPL-SCNC: 3.2 MMOL/L (ref 3.5–5.1)
PROT SERPL-MCNC: 6.8 G/DL (ref 5.7–8.2)
PROT UR STRIP.AUTO-MCNC: (no result) MG/DL
PROT UR-MCNC: 31.9 MG/DL (ref 0–11.9)
PROTHROMBIN TIME: 11.3 SEC (ref 9.3–11.8)
PROTHROMBIN TIME: 11.7 SEC (ref 9.3–11.8)
PROTHROMBIN TIME: 11.9 SEC (ref 9.3–11.8)
SODIUM SERPL-SCNC: 136 MMOL/L (ref 136–145)
WBC CLUMPS UR QL AUTO: PRESENT /HPF
YEAST # UR AUTO: (no result) /HPF

## 2024-05-09 RX ADMIN — DOCUSATE SODIUM PRN MG: 100 CAPSULE, LIQUID FILLED ORAL at 14:25

## 2024-05-09 RX ADMIN — HEPARIN SODIUM AND DEXTROSE SCH MLS/HR: 10000; 5 INJECTION INTRAVENOUS at 20:15

## 2024-05-09 RX ADMIN — HEPARIN SODIUM AND DEXTROSE SCH MLS/HR: 10000; 5 INJECTION INTRAVENOUS at 13:06

## 2024-05-09 RX ADMIN — NOREPINEPHRINE BITARTRATE SCH MLS/HR: 1 INJECTION, SOLUTION, CONCENTRATE INTRAVENOUS at 12:24

## 2024-05-09 RX ADMIN — NOREPINEPHRINE BITARTRATE ONE MLS/HR: 1 INJECTION, SOLUTION, CONCENTRATE INTRAVENOUS at 11:13

## 2024-05-09 RX ADMIN — DOPAMINE HYDROCHLORIDE IN DEXTROSE SCH MLS/HR: 1.6 INJECTION, SOLUTION INTRAVENOUS at 01:07

## 2024-05-09 RX ADMIN — DOPAMINE HYDROCHLORIDE IN DEXTROSE SCH MLS/HR: 1.6 INJECTION, SOLUTION INTRAVENOUS at 11:41

## 2024-05-09 RX ADMIN — ALLOPURINOL SCH MG: 100 TABLET ORAL at 11:36

## 2024-05-09 RX ADMIN — POTASSIUM CHLORIDE ONE MLS/HR: 200 INJECTION, SOLUTION INTRAVENOUS at 08:17

## 2024-05-09 RX ADMIN — SODIUM CHLORIDE SCH MLS/HR: 0.9 INJECTION, SOLUTION INTRAVENOUS at 08:06

## 2024-05-09 RX ADMIN — Medication SCH UNIT: at 11:36

## 2024-05-09 RX ADMIN — POTASSIUM BICARBONATE ONE MEQ: 978 TABLET, EFFERVESCENT ORAL at 10:25

## 2024-05-09 RX ADMIN — CLOPIDOGREL BISULFATE SCH MG: 75 TABLET ORAL at 11:36

## 2024-05-09 RX ADMIN — POTASSIUM CHLORIDE ONE MLS/HR: 200 INJECTION, SOLUTION INTRAVENOUS at 11:00

## 2024-05-09 RX ADMIN — HEPARIN SODIUM AND DEXTROSE SCH MLS/HR: 10000; 5 INJECTION INTRAVENOUS at 04:27

## 2024-05-10 VITALS
DIASTOLIC BLOOD PRESSURE: 33 MMHG | HEART RATE: 62 BPM | RESPIRATION RATE: 11 BRPM | OXYGEN SATURATION: 95 % | SYSTOLIC BLOOD PRESSURE: 80 MMHG

## 2024-05-10 VITALS — RESPIRATION RATE: 9 BRPM | HEART RATE: 54 BPM | OXYGEN SATURATION: 100 %

## 2024-05-10 VITALS
SYSTOLIC BLOOD PRESSURE: 113 MMHG | RESPIRATION RATE: 12 BRPM | OXYGEN SATURATION: 99 % | HEART RATE: 56 BPM | DIASTOLIC BLOOD PRESSURE: 38 MMHG

## 2024-05-10 VITALS
HEART RATE: 57 BPM | RESPIRATION RATE: 13 BRPM | DIASTOLIC BLOOD PRESSURE: 56 MMHG | OXYGEN SATURATION: 99 % | SYSTOLIC BLOOD PRESSURE: 114 MMHG

## 2024-05-10 VITALS
OXYGEN SATURATION: 99 % | RESPIRATION RATE: 9 BRPM | HEART RATE: 52 BPM | SYSTOLIC BLOOD PRESSURE: 65 MMHG | DIASTOLIC BLOOD PRESSURE: 45 MMHG

## 2024-05-10 VITALS
RESPIRATION RATE: 12 BRPM | SYSTOLIC BLOOD PRESSURE: 108 MMHG | OXYGEN SATURATION: 99 % | DIASTOLIC BLOOD PRESSURE: 36 MMHG | HEART RATE: 51 BPM

## 2024-05-10 VITALS — HEART RATE: 50 BPM | RESPIRATION RATE: 9 BRPM | OXYGEN SATURATION: 100 %

## 2024-05-10 VITALS
SYSTOLIC BLOOD PRESSURE: 147 MMHG | DIASTOLIC BLOOD PRESSURE: 40 MMHG | HEART RATE: 51 BPM | RESPIRATION RATE: 9 BRPM | OXYGEN SATURATION: 100 %

## 2024-05-10 VITALS
HEART RATE: 56 BPM | OXYGEN SATURATION: 100 % | RESPIRATION RATE: 12 BRPM | DIASTOLIC BLOOD PRESSURE: 35 MMHG | SYSTOLIC BLOOD PRESSURE: 110 MMHG

## 2024-05-10 VITALS
HEART RATE: 56 BPM | SYSTOLIC BLOOD PRESSURE: 129 MMHG | RESPIRATION RATE: 14 BRPM | DIASTOLIC BLOOD PRESSURE: 41 MMHG | OXYGEN SATURATION: 98 %

## 2024-05-10 VITALS
SYSTOLIC BLOOD PRESSURE: 109 MMHG | HEART RATE: 61 BPM | OXYGEN SATURATION: 96 % | RESPIRATION RATE: 16 BRPM | DIASTOLIC BLOOD PRESSURE: 82 MMHG

## 2024-05-10 VITALS
DIASTOLIC BLOOD PRESSURE: 43 MMHG | SYSTOLIC BLOOD PRESSURE: 102 MMHG | HEART RATE: 52 BPM | RESPIRATION RATE: 9 BRPM | OXYGEN SATURATION: 98 %

## 2024-05-10 VITALS
OXYGEN SATURATION: 98 % | SYSTOLIC BLOOD PRESSURE: 142 MMHG | RESPIRATION RATE: 14 BRPM | HEART RATE: 58 BPM | DIASTOLIC BLOOD PRESSURE: 105 MMHG

## 2024-05-10 VITALS — OXYGEN SATURATION: 97 % | HEART RATE: 63 BPM | RESPIRATION RATE: 14 BRPM

## 2024-05-10 VITALS
HEART RATE: 56 BPM | SYSTOLIC BLOOD PRESSURE: 129 MMHG | OXYGEN SATURATION: 99 % | RESPIRATION RATE: 10 BRPM | DIASTOLIC BLOOD PRESSURE: 40 MMHG

## 2024-05-10 VITALS
HEART RATE: 52 BPM | SYSTOLIC BLOOD PRESSURE: 99 MMHG | DIASTOLIC BLOOD PRESSURE: 25 MMHG | OXYGEN SATURATION: 100 % | RESPIRATION RATE: 10 BRPM

## 2024-05-10 VITALS
OXYGEN SATURATION: 100 % | DIASTOLIC BLOOD PRESSURE: 56 MMHG | SYSTOLIC BLOOD PRESSURE: 129 MMHG | RESPIRATION RATE: 8 BRPM | HEART RATE: 50 BPM

## 2024-05-10 VITALS
SYSTOLIC BLOOD PRESSURE: 115 MMHG | DIASTOLIC BLOOD PRESSURE: 43 MMHG | OXYGEN SATURATION: 98 % | HEART RATE: 55 BPM | RESPIRATION RATE: 12 BRPM

## 2024-05-10 VITALS — RESPIRATION RATE: 16 BRPM | OXYGEN SATURATION: 98 % | HEART RATE: 61 BPM

## 2024-05-10 VITALS
SYSTOLIC BLOOD PRESSURE: 108 MMHG | RESPIRATION RATE: 15 BRPM | HEART RATE: 54 BPM | DIASTOLIC BLOOD PRESSURE: 42 MMHG | OXYGEN SATURATION: 99 %

## 2024-05-10 VITALS
SYSTOLIC BLOOD PRESSURE: 122 MMHG | RESPIRATION RATE: 9 BRPM | DIASTOLIC BLOOD PRESSURE: 41 MMHG | OXYGEN SATURATION: 99 % | HEART RATE: 55 BPM

## 2024-05-10 VITALS
OXYGEN SATURATION: 99 % | RESPIRATION RATE: 16 BRPM | HEART RATE: 59 BPM | SYSTOLIC BLOOD PRESSURE: 100 MMHG | DIASTOLIC BLOOD PRESSURE: 28 MMHG

## 2024-05-10 VITALS
OXYGEN SATURATION: 99 % | HEART RATE: 52 BPM | SYSTOLIC BLOOD PRESSURE: 110 MMHG | RESPIRATION RATE: 9 BRPM | TEMPERATURE: 98 F | DIASTOLIC BLOOD PRESSURE: 49 MMHG

## 2024-05-10 VITALS
SYSTOLIC BLOOD PRESSURE: 129 MMHG | RESPIRATION RATE: 12 BRPM | HEART RATE: 55 BPM | DIASTOLIC BLOOD PRESSURE: 38 MMHG | OXYGEN SATURATION: 99 %

## 2024-05-10 VITALS
RESPIRATION RATE: 12 BRPM | DIASTOLIC BLOOD PRESSURE: 35 MMHG | OXYGEN SATURATION: 99 % | SYSTOLIC BLOOD PRESSURE: 136 MMHG | HEART RATE: 58 BPM

## 2024-05-10 VITALS
HEART RATE: 57 BPM | OXYGEN SATURATION: 98 % | DIASTOLIC BLOOD PRESSURE: 55 MMHG | RESPIRATION RATE: 16 BRPM | SYSTOLIC BLOOD PRESSURE: 152 MMHG

## 2024-05-10 VITALS
SYSTOLIC BLOOD PRESSURE: 112 MMHG | HEART RATE: 53 BPM | RESPIRATION RATE: 10 BRPM | DIASTOLIC BLOOD PRESSURE: 40 MMHG | OXYGEN SATURATION: 98 %

## 2024-05-10 VITALS
DIASTOLIC BLOOD PRESSURE: 49 MMHG | OXYGEN SATURATION: 99 % | SYSTOLIC BLOOD PRESSURE: 133 MMHG | HEART RATE: 54 BPM | RESPIRATION RATE: 11 BRPM

## 2024-05-10 VITALS
OXYGEN SATURATION: 99 % | RESPIRATION RATE: 10 BRPM | HEART RATE: 55 BPM | DIASTOLIC BLOOD PRESSURE: 40 MMHG | SYSTOLIC BLOOD PRESSURE: 100 MMHG

## 2024-05-10 VITALS
HEART RATE: 62 BPM | DIASTOLIC BLOOD PRESSURE: 25 MMHG | RESPIRATION RATE: 25 BRPM | SYSTOLIC BLOOD PRESSURE: 115 MMHG | OXYGEN SATURATION: 99 %

## 2024-05-10 VITALS — RESPIRATION RATE: 15 BRPM | HEART RATE: 56 BPM | TEMPERATURE: 97.1 F | OXYGEN SATURATION: 98 %

## 2024-05-10 VITALS
DIASTOLIC BLOOD PRESSURE: 60 MMHG | HEART RATE: 52 BPM | RESPIRATION RATE: 10 BRPM | OXYGEN SATURATION: 100 % | SYSTOLIC BLOOD PRESSURE: 119 MMHG

## 2024-05-10 VITALS
RESPIRATION RATE: 20 BRPM | HEART RATE: 57 BPM | OXYGEN SATURATION: 94 % | SYSTOLIC BLOOD PRESSURE: 104 MMHG | DIASTOLIC BLOOD PRESSURE: 26 MMHG

## 2024-05-10 VITALS
OXYGEN SATURATION: 100 % | DIASTOLIC BLOOD PRESSURE: 44 MMHG | HEART RATE: 61 BPM | SYSTOLIC BLOOD PRESSURE: 134 MMHG | RESPIRATION RATE: 12 BRPM

## 2024-05-10 VITALS
HEART RATE: 53 BPM | DIASTOLIC BLOOD PRESSURE: 20 MMHG | SYSTOLIC BLOOD PRESSURE: 86 MMHG | RESPIRATION RATE: 10 BRPM | OXYGEN SATURATION: 97 %

## 2024-05-10 VITALS
HEART RATE: 60 BPM | OXYGEN SATURATION: 95 % | SYSTOLIC BLOOD PRESSURE: 94 MMHG | RESPIRATION RATE: 11 BRPM | DIASTOLIC BLOOD PRESSURE: 14 MMHG

## 2024-05-10 VITALS — RESPIRATION RATE: 11 BRPM | OXYGEN SATURATION: 99 % | HEART RATE: 54 BPM

## 2024-05-10 VITALS
RESPIRATION RATE: 16 BRPM | DIASTOLIC BLOOD PRESSURE: 55 MMHG | SYSTOLIC BLOOD PRESSURE: 129 MMHG | OXYGEN SATURATION: 100 % | HEART RATE: 53 BPM

## 2024-05-10 VITALS
SYSTOLIC BLOOD PRESSURE: 120 MMHG | OXYGEN SATURATION: 99 % | RESPIRATION RATE: 11 BRPM | DIASTOLIC BLOOD PRESSURE: 40 MMHG | HEART RATE: 57 BPM

## 2024-05-10 VITALS
OXYGEN SATURATION: 99 % | SYSTOLIC BLOOD PRESSURE: 277 MMHG | DIASTOLIC BLOOD PRESSURE: 247 MMHG | HEART RATE: 57 BPM | RESPIRATION RATE: 11 BRPM

## 2024-05-10 VITALS — HEART RATE: 53 BPM | RESPIRATION RATE: 9 BRPM | OXYGEN SATURATION: 99 %

## 2024-05-10 VITALS
SYSTOLIC BLOOD PRESSURE: 126 MMHG | OXYGEN SATURATION: 98 % | HEART RATE: 56 BPM | DIASTOLIC BLOOD PRESSURE: 19 MMHG | RESPIRATION RATE: 10 BRPM

## 2024-05-10 VITALS
OXYGEN SATURATION: 99 % | RESPIRATION RATE: 10 BRPM | DIASTOLIC BLOOD PRESSURE: 39 MMHG | SYSTOLIC BLOOD PRESSURE: 131 MMHG | HEART RATE: 56 BPM

## 2024-05-10 VITALS
OXYGEN SATURATION: 98 % | HEART RATE: 61 BPM | RESPIRATION RATE: 15 BRPM | DIASTOLIC BLOOD PRESSURE: 126 MMHG | SYSTOLIC BLOOD PRESSURE: 147 MMHG

## 2024-05-10 VITALS — OXYGEN SATURATION: 98 % | HEART RATE: 55 BPM | RESPIRATION RATE: 9 BRPM

## 2024-05-10 VITALS
DIASTOLIC BLOOD PRESSURE: 36 MMHG | RESPIRATION RATE: 11 BRPM | HEART RATE: 53 BPM | OXYGEN SATURATION: 97 % | SYSTOLIC BLOOD PRESSURE: 106 MMHG

## 2024-05-10 VITALS — RESPIRATION RATE: 9 BRPM | HEART RATE: 51 BPM | OXYGEN SATURATION: 98 %

## 2024-05-10 VITALS — HEART RATE: 53 BPM | OXYGEN SATURATION: 100 % | RESPIRATION RATE: 9 BRPM

## 2024-05-10 VITALS
OXYGEN SATURATION: 99 % | SYSTOLIC BLOOD PRESSURE: 118 MMHG | RESPIRATION RATE: 10 BRPM | HEART RATE: 53 BPM | DIASTOLIC BLOOD PRESSURE: 46 MMHG

## 2024-05-10 VITALS
RESPIRATION RATE: 16 BRPM | DIASTOLIC BLOOD PRESSURE: 37 MMHG | HEART RATE: 59 BPM | OXYGEN SATURATION: 96 % | SYSTOLIC BLOOD PRESSURE: 98 MMHG

## 2024-05-10 VITALS
HEART RATE: 51 BPM | SYSTOLIC BLOOD PRESSURE: 154 MMHG | DIASTOLIC BLOOD PRESSURE: 60 MMHG | RESPIRATION RATE: 9 BRPM | OXYGEN SATURATION: 100 %

## 2024-05-10 VITALS
RESPIRATION RATE: 9 BRPM | SYSTOLIC BLOOD PRESSURE: 137 MMHG | OXYGEN SATURATION: 100 % | HEART RATE: 53 BPM | DIASTOLIC BLOOD PRESSURE: 48 MMHG

## 2024-05-10 VITALS
OXYGEN SATURATION: 100 % | SYSTOLIC BLOOD PRESSURE: 118 MMHG | HEART RATE: 58 BPM | RESPIRATION RATE: 11 BRPM | DIASTOLIC BLOOD PRESSURE: 45 MMHG

## 2024-05-10 VITALS
RESPIRATION RATE: 10 BRPM | HEART RATE: 50 BPM | SYSTOLIC BLOOD PRESSURE: 130 MMHG | DIASTOLIC BLOOD PRESSURE: 43 MMHG | OXYGEN SATURATION: 100 %

## 2024-05-10 VITALS
RESPIRATION RATE: 11 BRPM | OXYGEN SATURATION: 98 % | DIASTOLIC BLOOD PRESSURE: 41 MMHG | HEART RATE: 52 BPM | SYSTOLIC BLOOD PRESSURE: 122 MMHG | TEMPERATURE: 97.4 F

## 2024-05-10 VITALS
HEART RATE: 56 BPM | RESPIRATION RATE: 11 BRPM | OXYGEN SATURATION: 96 % | DIASTOLIC BLOOD PRESSURE: 51 MMHG | SYSTOLIC BLOOD PRESSURE: 120 MMHG

## 2024-05-10 VITALS
HEART RATE: 57 BPM | DIASTOLIC BLOOD PRESSURE: 54 MMHG | OXYGEN SATURATION: 98 % | SYSTOLIC BLOOD PRESSURE: 75 MMHG | RESPIRATION RATE: 12 BRPM

## 2024-05-10 VITALS
SYSTOLIC BLOOD PRESSURE: 137 MMHG | DIASTOLIC BLOOD PRESSURE: 39 MMHG | RESPIRATION RATE: 10 BRPM | HEART RATE: 51 BPM | OXYGEN SATURATION: 100 %

## 2024-05-10 VITALS
TEMPERATURE: 97.9 F | DIASTOLIC BLOOD PRESSURE: 45 MMHG | OXYGEN SATURATION: 97 % | HEART RATE: 57 BPM | RESPIRATION RATE: 11 BRPM | SYSTOLIC BLOOD PRESSURE: 147 MMHG

## 2024-05-10 VITALS
RESPIRATION RATE: 11 BRPM | SYSTOLIC BLOOD PRESSURE: 129 MMHG | HEART RATE: 57 BPM | OXYGEN SATURATION: 98 % | DIASTOLIC BLOOD PRESSURE: 43 MMHG

## 2024-05-10 VITALS
DIASTOLIC BLOOD PRESSURE: 122 MMHG | HEART RATE: 55 BPM | OXYGEN SATURATION: 100 % | SYSTOLIC BLOOD PRESSURE: 140 MMHG | RESPIRATION RATE: 11 BRPM

## 2024-05-10 VITALS
SYSTOLIC BLOOD PRESSURE: 133 MMHG | OXYGEN SATURATION: 99 % | DIASTOLIC BLOOD PRESSURE: 44 MMHG | RESPIRATION RATE: 9 BRPM | HEART RATE: 52 BPM

## 2024-05-10 VITALS
SYSTOLIC BLOOD PRESSURE: 140 MMHG | OXYGEN SATURATION: 98 % | RESPIRATION RATE: 12 BRPM | DIASTOLIC BLOOD PRESSURE: 38 MMHG | HEART RATE: 59 BPM

## 2024-05-10 VITALS
HEART RATE: 56 BPM | SYSTOLIC BLOOD PRESSURE: 135 MMHG | RESPIRATION RATE: 10 BRPM | DIASTOLIC BLOOD PRESSURE: 46 MMHG | OXYGEN SATURATION: 100 %

## 2024-05-10 VITALS
RESPIRATION RATE: 15 BRPM | HEART RATE: 61 BPM | OXYGEN SATURATION: 93 % | SYSTOLIC BLOOD PRESSURE: 114 MMHG | DIASTOLIC BLOOD PRESSURE: 36 MMHG

## 2024-05-10 VITALS — HEART RATE: 56 BPM | OXYGEN SATURATION: 100 % | RESPIRATION RATE: 10 BRPM

## 2024-05-10 VITALS
SYSTOLIC BLOOD PRESSURE: 115 MMHG | HEART RATE: 58 BPM | DIASTOLIC BLOOD PRESSURE: 45 MMHG | OXYGEN SATURATION: 99 % | RESPIRATION RATE: 9 BRPM | TEMPERATURE: 97.8 F

## 2024-05-10 VITALS
RESPIRATION RATE: 10 BRPM | OXYGEN SATURATION: 99 % | HEART RATE: 62 BPM | DIASTOLIC BLOOD PRESSURE: 38 MMHG | SYSTOLIC BLOOD PRESSURE: 131 MMHG

## 2024-05-10 VITALS
SYSTOLIC BLOOD PRESSURE: 110 MMHG | HEART RATE: 58 BPM | DIASTOLIC BLOOD PRESSURE: 36 MMHG | OXYGEN SATURATION: 99 % | RESPIRATION RATE: 13 BRPM

## 2024-05-10 VITALS
RESPIRATION RATE: 11 BRPM | DIASTOLIC BLOOD PRESSURE: 39 MMHG | SYSTOLIC BLOOD PRESSURE: 118 MMHG | HEART RATE: 50 BPM | OXYGEN SATURATION: 100 %

## 2024-05-10 VITALS
OXYGEN SATURATION: 98 % | SYSTOLIC BLOOD PRESSURE: 109 MMHG | HEART RATE: 55 BPM | RESPIRATION RATE: 10 BRPM | DIASTOLIC BLOOD PRESSURE: 19 MMHG

## 2024-05-10 VITALS
RESPIRATION RATE: 12 BRPM | DIASTOLIC BLOOD PRESSURE: 122 MMHG | OXYGEN SATURATION: 100 % | SYSTOLIC BLOOD PRESSURE: 140 MMHG | HEART RATE: 57 BPM

## 2024-05-10 VITALS
OXYGEN SATURATION: 98 % | DIASTOLIC BLOOD PRESSURE: 60 MMHG | SYSTOLIC BLOOD PRESSURE: 123 MMHG | RESPIRATION RATE: 14 BRPM | HEART RATE: 59 BPM

## 2024-05-10 VITALS — OXYGEN SATURATION: 100 % | RESPIRATION RATE: 10 BRPM | HEART RATE: 54 BPM

## 2024-05-10 VITALS
SYSTOLIC BLOOD PRESSURE: 137 MMHG | RESPIRATION RATE: 10 BRPM | DIASTOLIC BLOOD PRESSURE: 47 MMHG | OXYGEN SATURATION: 100 % | HEART RATE: 50 BPM

## 2024-05-10 VITALS
HEART RATE: 57 BPM | SYSTOLIC BLOOD PRESSURE: 73 MMHG | RESPIRATION RATE: 13 BRPM | OXYGEN SATURATION: 97 % | DIASTOLIC BLOOD PRESSURE: 40 MMHG

## 2024-05-10 VITALS
RESPIRATION RATE: 11 BRPM | DIASTOLIC BLOOD PRESSURE: 49 MMHG | SYSTOLIC BLOOD PRESSURE: 131 MMHG | HEART RATE: 52 BPM | OXYGEN SATURATION: 100 %

## 2024-05-10 VITALS
HEART RATE: 59 BPM | OXYGEN SATURATION: 99 % | SYSTOLIC BLOOD PRESSURE: 136 MMHG | RESPIRATION RATE: 12 BRPM | DIASTOLIC BLOOD PRESSURE: 45 MMHG

## 2024-05-10 VITALS
DIASTOLIC BLOOD PRESSURE: 49 MMHG | SYSTOLIC BLOOD PRESSURE: 139 MMHG | HEART RATE: 52 BPM | RESPIRATION RATE: 10 BRPM | OXYGEN SATURATION: 100 %

## 2024-05-10 VITALS
HEART RATE: 53 BPM | OXYGEN SATURATION: 98 % | SYSTOLIC BLOOD PRESSURE: 117 MMHG | RESPIRATION RATE: 11 BRPM | DIASTOLIC BLOOD PRESSURE: 47 MMHG

## 2024-05-10 VITALS
HEART RATE: 52 BPM | RESPIRATION RATE: 10 BRPM | OXYGEN SATURATION: 99 % | SYSTOLIC BLOOD PRESSURE: 123 MMHG | DIASTOLIC BLOOD PRESSURE: 36 MMHG

## 2024-05-10 VITALS — RESPIRATION RATE: 10 BRPM | OXYGEN SATURATION: 98 % | HEART RATE: 51 BPM

## 2024-05-10 VITALS — HEART RATE: 59 BPM | OXYGEN SATURATION: 97 % | RESPIRATION RATE: 12 BRPM

## 2024-05-10 VITALS
HEART RATE: 57 BPM | RESPIRATION RATE: 11 BRPM | SYSTOLIC BLOOD PRESSURE: 123 MMHG | DIASTOLIC BLOOD PRESSURE: 42 MMHG | OXYGEN SATURATION: 98 %

## 2024-05-10 VITALS
HEART RATE: 57 BPM | DIASTOLIC BLOOD PRESSURE: 35 MMHG | RESPIRATION RATE: 10 BRPM | OXYGEN SATURATION: 99 % | SYSTOLIC BLOOD PRESSURE: 113 MMHG

## 2024-05-10 VITALS
SYSTOLIC BLOOD PRESSURE: 100 MMHG | RESPIRATION RATE: 10 BRPM | HEART RATE: 53 BPM | DIASTOLIC BLOOD PRESSURE: 57 MMHG | OXYGEN SATURATION: 99 %

## 2024-05-10 VITALS
HEART RATE: 54 BPM | DIASTOLIC BLOOD PRESSURE: 46 MMHG | SYSTOLIC BLOOD PRESSURE: 136 MMHG | OXYGEN SATURATION: 99 % | RESPIRATION RATE: 11 BRPM

## 2024-05-10 VITALS
RESPIRATION RATE: 13 BRPM | HEART RATE: 60 BPM | OXYGEN SATURATION: 99 % | SYSTOLIC BLOOD PRESSURE: 132 MMHG | DIASTOLIC BLOOD PRESSURE: 44 MMHG

## 2024-05-10 VITALS — OXYGEN SATURATION: 100 % | HEART RATE: 57 BPM | RESPIRATION RATE: 15 BRPM

## 2024-05-10 VITALS
RESPIRATION RATE: 13 BRPM | DIASTOLIC BLOOD PRESSURE: 30 MMHG | SYSTOLIC BLOOD PRESSURE: 138 MMHG | HEART RATE: 59 BPM | OXYGEN SATURATION: 99 %

## 2024-05-10 VITALS
SYSTOLIC BLOOD PRESSURE: 123 MMHG | OXYGEN SATURATION: 98 % | HEART RATE: 49 BPM | DIASTOLIC BLOOD PRESSURE: 37 MMHG | RESPIRATION RATE: 9 BRPM

## 2024-05-10 VITALS
RESPIRATION RATE: 11 BRPM | HEART RATE: 53 BPM | OXYGEN SATURATION: 98 % | SYSTOLIC BLOOD PRESSURE: 107 MMHG | DIASTOLIC BLOOD PRESSURE: 40 MMHG

## 2024-05-10 VITALS
DIASTOLIC BLOOD PRESSURE: 128 MMHG | OXYGEN SATURATION: 99 % | SYSTOLIC BLOOD PRESSURE: 151 MMHG | RESPIRATION RATE: 14 BRPM | HEART RATE: 55 BPM

## 2024-05-10 VITALS — RESPIRATION RATE: 11 BRPM | OXYGEN SATURATION: 100 % | HEART RATE: 55 BPM

## 2024-05-10 VITALS
HEART RATE: 59 BPM | DIASTOLIC BLOOD PRESSURE: 24 MMHG | SYSTOLIC BLOOD PRESSURE: 105 MMHG | RESPIRATION RATE: 11 BRPM | OXYGEN SATURATION: 98 %

## 2024-05-10 LAB
ALBUMIN SERPL-MCNC: 3.3 G/DL (ref 3.2–4.8)
ALP SERPL-CCNC: 50 U/L (ref 46–116)
ALT SERPL-CCNC: < 9 U/L (ref 7–40)
ANION GAP SERPL CALCULATED.3IONS-SCNC: 5 MMOL/L (ref 5–15)
APTT PPP: 48.7 SEC (ref 24.5–34.5)
APTT PPP: 69 SEC (ref 24.5–34.5)
APTT PPP: 71.2 SEC (ref 24.5–34.5)
APTT PPP: 85.1 SEC (ref 24.5–34.5)
BILIRUB SERPL-MCNC: 0.3 MG/DL (ref 0.2–1)
BUN SERPL-MCNC: 34 MG/DL (ref 9–23)
BUN/CREAT SERPL: 16.1 (ref 10–20)
CALCIUM SERPL-MCNC: 8.7 MG/DL (ref 8.5–10.1)
CHLORIDE SERPL-SCNC: 101 MMOL/L (ref 98–107)
CO2 SERPL-SCNC: 28 MMOL/L (ref 20–30)
GLUCOSE SERPL-MCNC: 132 MG/DL (ref 74–106)
HCT VFR BLD AUTO: 33.6 % (ref 41–53)
HGB BLD-MCNC: 10.6 G/DL (ref 13.5–17.5)
INR PPP: 1.05 (ref 0.9–1.15)
INR PPP: 1.06 (ref 0.9–1.15)
INR PPP: 1.06 (ref 0.9–1.15)
INR PPP: 1.07 (ref 0.9–1.15)
MAGNESIUM SERPL-MCNC: 1.7 MG/DL (ref 1.6–2.6)
MCH RBC QN AUTO: 28.2 PG (ref 28–32)
MCV RBC AUTO: 89.3 FL (ref 80–100)
NRBC BLD QL AUTO: 0 %
POTASSIUM SERPL-SCNC: 3.7 MMOL/L (ref 3.5–5.1)
PROT SERPL-MCNC: 6 G/DL (ref 5.7–8.2)
PROTHROMBIN TIME: 11.1 SEC (ref 9.3–11.8)
PROTHROMBIN TIME: 11.2 SEC (ref 9.3–11.8)
PROTHROMBIN TIME: 11.2 SEC (ref 9.3–11.8)
PROTHROMBIN TIME: 11.3 SEC (ref 9.3–11.8)
SODIUM SERPL-SCNC: 134 MMOL/L (ref 136–145)

## 2024-05-10 RX ADMIN — MAGNESIUM OXIDE TAB 400 MG (241.3 MG ELEMENTAL MG) ONE MG: 400 (241.3 MG) TAB at 16:26

## 2024-05-10 RX ADMIN — NYSTATIN SCH APPLIC: 100000 POWDER TOPICAL at 22:00

## 2024-05-10 RX ADMIN — ONDANSETRON HYDROCHLORIDE PRN MG: 2 INJECTION, SOLUTION INTRAMUSCULAR; INTRAVENOUS at 13:46

## 2024-05-11 VITALS
OXYGEN SATURATION: 100 % | SYSTOLIC BLOOD PRESSURE: 114 MMHG | RESPIRATION RATE: 9 BRPM | HEART RATE: 53 BPM | DIASTOLIC BLOOD PRESSURE: 35 MMHG

## 2024-05-11 VITALS — OXYGEN SATURATION: 98 % | HEART RATE: 56 BPM | RESPIRATION RATE: 12 BRPM

## 2024-05-11 VITALS
DIASTOLIC BLOOD PRESSURE: 27 MMHG | OXYGEN SATURATION: 100 % | HEART RATE: 48 BPM | TEMPERATURE: 97.6 F | SYSTOLIC BLOOD PRESSURE: 100 MMHG | RESPIRATION RATE: 12 BRPM

## 2024-05-11 VITALS
OXYGEN SATURATION: 98 % | DIASTOLIC BLOOD PRESSURE: 38 MMHG | SYSTOLIC BLOOD PRESSURE: 108 MMHG | RESPIRATION RATE: 9 BRPM | HEART RATE: 53 BPM

## 2024-05-11 VITALS
DIASTOLIC BLOOD PRESSURE: 36 MMHG | HEART RATE: 49 BPM | SYSTOLIC BLOOD PRESSURE: 117 MMHG | RESPIRATION RATE: 8 BRPM | OXYGEN SATURATION: 100 %

## 2024-05-11 VITALS
OXYGEN SATURATION: 100 % | DIASTOLIC BLOOD PRESSURE: 29 MMHG | HEART RATE: 54 BPM | SYSTOLIC BLOOD PRESSURE: 99 MMHG | RESPIRATION RATE: 11 BRPM

## 2024-05-11 VITALS
HEART RATE: 50 BPM | RESPIRATION RATE: 10 BRPM | DIASTOLIC BLOOD PRESSURE: 33 MMHG | SYSTOLIC BLOOD PRESSURE: 54 MMHG | OXYGEN SATURATION: 97 %

## 2024-05-11 VITALS
OXYGEN SATURATION: 100 % | HEART RATE: 53 BPM | SYSTOLIC BLOOD PRESSURE: 114 MMHG | RESPIRATION RATE: 12 BRPM | DIASTOLIC BLOOD PRESSURE: 41 MMHG

## 2024-05-11 VITALS
SYSTOLIC BLOOD PRESSURE: 153 MMHG | HEART RATE: 58 BPM | RESPIRATION RATE: 11 BRPM | DIASTOLIC BLOOD PRESSURE: 55 MMHG | OXYGEN SATURATION: 95 %

## 2024-05-11 VITALS — HEART RATE: 49 BPM | OXYGEN SATURATION: 100 % | RESPIRATION RATE: 11 BRPM

## 2024-05-11 VITALS
RESPIRATION RATE: 11 BRPM | DIASTOLIC BLOOD PRESSURE: 31 MMHG | OXYGEN SATURATION: 100 % | HEART RATE: 55 BPM | SYSTOLIC BLOOD PRESSURE: 111 MMHG

## 2024-05-11 VITALS
RESPIRATION RATE: 10 BRPM | HEART RATE: 55 BPM | DIASTOLIC BLOOD PRESSURE: 31 MMHG | SYSTOLIC BLOOD PRESSURE: 111 MMHG | OXYGEN SATURATION: 100 %

## 2024-05-11 VITALS
HEART RATE: 52 BPM | OXYGEN SATURATION: 100 % | RESPIRATION RATE: 9 BRPM | SYSTOLIC BLOOD PRESSURE: 137 MMHG | DIASTOLIC BLOOD PRESSURE: 44 MMHG

## 2024-05-11 VITALS
OXYGEN SATURATION: 100 % | SYSTOLIC BLOOD PRESSURE: 134 MMHG | DIASTOLIC BLOOD PRESSURE: 51 MMHG | HEART RATE: 48 BPM | RESPIRATION RATE: 12 BRPM

## 2024-05-11 VITALS
OXYGEN SATURATION: 95 % | HEART RATE: 52 BPM | DIASTOLIC BLOOD PRESSURE: 30 MMHG | SYSTOLIC BLOOD PRESSURE: 108 MMHG | RESPIRATION RATE: 8 BRPM

## 2024-05-11 VITALS
OXYGEN SATURATION: 100 % | RESPIRATION RATE: 10 BRPM | SYSTOLIC BLOOD PRESSURE: 120 MMHG | DIASTOLIC BLOOD PRESSURE: 34 MMHG | HEART RATE: 51 BPM

## 2024-05-11 VITALS
HEART RATE: 54 BPM | OXYGEN SATURATION: 100 % | SYSTOLIC BLOOD PRESSURE: 121 MMHG | RESPIRATION RATE: 9 BRPM | DIASTOLIC BLOOD PRESSURE: 36 MMHG

## 2024-05-11 VITALS
DIASTOLIC BLOOD PRESSURE: 33 MMHG | OXYGEN SATURATION: 100 % | SYSTOLIC BLOOD PRESSURE: 102 MMHG | HEART RATE: 56 BPM | RESPIRATION RATE: 14 BRPM

## 2024-05-11 VITALS
SYSTOLIC BLOOD PRESSURE: 109 MMHG | DIASTOLIC BLOOD PRESSURE: 33 MMHG | RESPIRATION RATE: 10 BRPM | HEART RATE: 48 BPM | OXYGEN SATURATION: 99 %

## 2024-05-11 VITALS
HEART RATE: 54 BPM | DIASTOLIC BLOOD PRESSURE: 49 MMHG | RESPIRATION RATE: 14 BRPM | OXYGEN SATURATION: 100 % | SYSTOLIC BLOOD PRESSURE: 103 MMHG

## 2024-05-11 VITALS
SYSTOLIC BLOOD PRESSURE: 135 MMHG | OXYGEN SATURATION: 100 % | DIASTOLIC BLOOD PRESSURE: 47 MMHG | RESPIRATION RATE: 10 BRPM | HEART RATE: 56 BPM

## 2024-05-11 VITALS — RESPIRATION RATE: 11 BRPM | HEART RATE: 57 BPM | OXYGEN SATURATION: 100 %

## 2024-05-11 VITALS
DIASTOLIC BLOOD PRESSURE: 38 MMHG | SYSTOLIC BLOOD PRESSURE: 111 MMHG | HEART RATE: 53 BPM | OXYGEN SATURATION: 97 % | RESPIRATION RATE: 11 BRPM

## 2024-05-11 VITALS
DIASTOLIC BLOOD PRESSURE: 28 MMHG | OXYGEN SATURATION: 100 % | HEART RATE: 49 BPM | SYSTOLIC BLOOD PRESSURE: 109 MMHG | RESPIRATION RATE: 11 BRPM

## 2024-05-11 VITALS
RESPIRATION RATE: 12 BRPM | HEART RATE: 54 BPM | OXYGEN SATURATION: 99 % | DIASTOLIC BLOOD PRESSURE: 44 MMHG | SYSTOLIC BLOOD PRESSURE: 129 MMHG

## 2024-05-11 VITALS
DIASTOLIC BLOOD PRESSURE: 54 MMHG | TEMPERATURE: 98.4 F | SYSTOLIC BLOOD PRESSURE: 156 MMHG | RESPIRATION RATE: 11 BRPM | OXYGEN SATURATION: 100 % | HEART RATE: 52 BPM

## 2024-05-11 VITALS
RESPIRATION RATE: 10 BRPM | SYSTOLIC BLOOD PRESSURE: 128 MMHG | DIASTOLIC BLOOD PRESSURE: 57 MMHG | OXYGEN SATURATION: 97 % | HEART RATE: 53 BPM

## 2024-05-11 VITALS
DIASTOLIC BLOOD PRESSURE: 40 MMHG | RESPIRATION RATE: 11 BRPM | HEART RATE: 53 BPM | OXYGEN SATURATION: 97 % | SYSTOLIC BLOOD PRESSURE: 103 MMHG

## 2024-05-11 VITALS
SYSTOLIC BLOOD PRESSURE: 141 MMHG | OXYGEN SATURATION: 100 % | RESPIRATION RATE: 11 BRPM | HEART RATE: 55 BPM | DIASTOLIC BLOOD PRESSURE: 41 MMHG

## 2024-05-11 VITALS
DIASTOLIC BLOOD PRESSURE: 36 MMHG | SYSTOLIC BLOOD PRESSURE: 99 MMHG | HEART RATE: 56 BPM | RESPIRATION RATE: 12 BRPM | OXYGEN SATURATION: 96 %

## 2024-05-11 VITALS
RESPIRATION RATE: 12 BRPM | OXYGEN SATURATION: 100 % | DIASTOLIC BLOOD PRESSURE: 47 MMHG | HEART RATE: 54 BPM | SYSTOLIC BLOOD PRESSURE: 132 MMHG

## 2024-05-11 VITALS
HEART RATE: 58 BPM | SYSTOLIC BLOOD PRESSURE: 135 MMHG | OXYGEN SATURATION: 100 % | DIASTOLIC BLOOD PRESSURE: 47 MMHG | RESPIRATION RATE: 10 BRPM

## 2024-05-11 VITALS
OXYGEN SATURATION: 98 % | DIASTOLIC BLOOD PRESSURE: 49 MMHG | HEART RATE: 49 BPM | SYSTOLIC BLOOD PRESSURE: 101 MMHG | RESPIRATION RATE: 14 BRPM

## 2024-05-11 VITALS — HEART RATE: 50 BPM | OXYGEN SATURATION: 98 % | RESPIRATION RATE: 10 BRPM

## 2024-05-11 VITALS
SYSTOLIC BLOOD PRESSURE: 95 MMHG | DIASTOLIC BLOOD PRESSURE: 61 MMHG | RESPIRATION RATE: 11 BRPM | HEART RATE: 54 BPM | OXYGEN SATURATION: 100 %

## 2024-05-11 VITALS
OXYGEN SATURATION: 100 % | SYSTOLIC BLOOD PRESSURE: 117 MMHG | RESPIRATION RATE: 13 BRPM | DIASTOLIC BLOOD PRESSURE: 29 MMHG | HEART RATE: 51 BPM

## 2024-05-11 VITALS
HEART RATE: 55 BPM | TEMPERATURE: 97.6 F | SYSTOLIC BLOOD PRESSURE: 114 MMHG | OXYGEN SATURATION: 90 % | DIASTOLIC BLOOD PRESSURE: 33 MMHG | RESPIRATION RATE: 12 BRPM

## 2024-05-11 VITALS
SYSTOLIC BLOOD PRESSURE: 131 MMHG | DIASTOLIC BLOOD PRESSURE: 58 MMHG | HEART RATE: 47 BPM | OXYGEN SATURATION: 100 % | RESPIRATION RATE: 12 BRPM

## 2024-05-11 VITALS
TEMPERATURE: 98 F | OXYGEN SATURATION: 99 % | RESPIRATION RATE: 11 BRPM | DIASTOLIC BLOOD PRESSURE: 34 MMHG | SYSTOLIC BLOOD PRESSURE: 109 MMHG | HEART RATE: 54 BPM

## 2024-05-11 VITALS
DIASTOLIC BLOOD PRESSURE: 27 MMHG | HEART RATE: 54 BPM | SYSTOLIC BLOOD PRESSURE: 114 MMHG | OXYGEN SATURATION: 99 % | RESPIRATION RATE: 13 BRPM

## 2024-05-11 VITALS
OXYGEN SATURATION: 100 % | RESPIRATION RATE: 11 BRPM | SYSTOLIC BLOOD PRESSURE: 107 MMHG | HEART RATE: 58 BPM | DIASTOLIC BLOOD PRESSURE: 36 MMHG

## 2024-05-11 VITALS
HEART RATE: 57 BPM | RESPIRATION RATE: 10 BRPM | OXYGEN SATURATION: 100 % | SYSTOLIC BLOOD PRESSURE: 100 MMHG | DIASTOLIC BLOOD PRESSURE: 53 MMHG

## 2024-05-11 VITALS
HEART RATE: 59 BPM | OXYGEN SATURATION: 99 % | SYSTOLIC BLOOD PRESSURE: 106 MMHG | DIASTOLIC BLOOD PRESSURE: 29 MMHG | RESPIRATION RATE: 14 BRPM

## 2024-05-11 VITALS
DIASTOLIC BLOOD PRESSURE: 48 MMHG | OXYGEN SATURATION: 100 % | RESPIRATION RATE: 11 BRPM | HEART RATE: 54 BPM | SYSTOLIC BLOOD PRESSURE: 118 MMHG

## 2024-05-11 VITALS — RESPIRATION RATE: 12 BRPM | OXYGEN SATURATION: 98 % | HEART RATE: 55 BPM

## 2024-05-11 VITALS
OXYGEN SATURATION: 99 % | SYSTOLIC BLOOD PRESSURE: 100 MMHG | DIASTOLIC BLOOD PRESSURE: 38 MMHG | HEART RATE: 52 BPM | RESPIRATION RATE: 11 BRPM

## 2024-05-11 VITALS
OXYGEN SATURATION: 100 % | SYSTOLIC BLOOD PRESSURE: 133 MMHG | RESPIRATION RATE: 10 BRPM | DIASTOLIC BLOOD PRESSURE: 42 MMHG | HEART RATE: 58 BPM

## 2024-05-11 VITALS
RESPIRATION RATE: 10 BRPM | SYSTOLIC BLOOD PRESSURE: 128 MMHG | HEART RATE: 52 BPM | OXYGEN SATURATION: 98 % | DIASTOLIC BLOOD PRESSURE: 33 MMHG

## 2024-05-11 VITALS
OXYGEN SATURATION: 96 % | DIASTOLIC BLOOD PRESSURE: 54 MMHG | HEART RATE: 53 BPM | SYSTOLIC BLOOD PRESSURE: 116 MMHG | RESPIRATION RATE: 9 BRPM

## 2024-05-11 VITALS
OXYGEN SATURATION: 100 % | DIASTOLIC BLOOD PRESSURE: 33 MMHG | RESPIRATION RATE: 10 BRPM | HEART RATE: 47 BPM | SYSTOLIC BLOOD PRESSURE: 108 MMHG

## 2024-05-11 VITALS
OXYGEN SATURATION: 100 % | HEART RATE: 54 BPM | RESPIRATION RATE: 10 BRPM | SYSTOLIC BLOOD PRESSURE: 139 MMHG | DIASTOLIC BLOOD PRESSURE: 49 MMHG

## 2024-05-11 VITALS
HEART RATE: 46 BPM | RESPIRATION RATE: 10 BRPM | DIASTOLIC BLOOD PRESSURE: 50 MMHG | SYSTOLIC BLOOD PRESSURE: 125 MMHG | OXYGEN SATURATION: 100 %

## 2024-05-11 VITALS
DIASTOLIC BLOOD PRESSURE: 47 MMHG | OXYGEN SATURATION: 99 % | RESPIRATION RATE: 18 BRPM | SYSTOLIC BLOOD PRESSURE: 125 MMHG | HEART RATE: 57 BPM

## 2024-05-11 VITALS
OXYGEN SATURATION: 100 % | SYSTOLIC BLOOD PRESSURE: 133 MMHG | HEART RATE: 55 BPM | DIASTOLIC BLOOD PRESSURE: 44 MMHG | RESPIRATION RATE: 9 BRPM

## 2024-05-11 VITALS
DIASTOLIC BLOOD PRESSURE: 90 MMHG | RESPIRATION RATE: 10 BRPM | SYSTOLIC BLOOD PRESSURE: 108 MMHG | OXYGEN SATURATION: 100 % | HEART RATE: 51 BPM

## 2024-05-11 VITALS
HEART RATE: 54 BPM | SYSTOLIC BLOOD PRESSURE: 112 MMHG | OXYGEN SATURATION: 99 % | RESPIRATION RATE: 11 BRPM | DIASTOLIC BLOOD PRESSURE: 55 MMHG

## 2024-05-11 VITALS
HEART RATE: 60 BPM | RESPIRATION RATE: 17 BRPM | DIASTOLIC BLOOD PRESSURE: 26 MMHG | OXYGEN SATURATION: 98 % | SYSTOLIC BLOOD PRESSURE: 132 MMHG

## 2024-05-11 VITALS
DIASTOLIC BLOOD PRESSURE: 25 MMHG | SYSTOLIC BLOOD PRESSURE: 98 MMHG | OXYGEN SATURATION: 95 % | HEART RATE: 57 BPM | RESPIRATION RATE: 15 BRPM

## 2024-05-11 VITALS
DIASTOLIC BLOOD PRESSURE: 43 MMHG | RESPIRATION RATE: 12 BRPM | SYSTOLIC BLOOD PRESSURE: 104 MMHG | OXYGEN SATURATION: 100 % | HEART RATE: 49 BPM

## 2024-05-11 VITALS
DIASTOLIC BLOOD PRESSURE: 48 MMHG | RESPIRATION RATE: 10 BRPM | SYSTOLIC BLOOD PRESSURE: 134 MMHG | OXYGEN SATURATION: 99 % | HEART RATE: 55 BPM

## 2024-05-11 VITALS
OXYGEN SATURATION: 99 % | SYSTOLIC BLOOD PRESSURE: 113 MMHG | RESPIRATION RATE: 9 BRPM | DIASTOLIC BLOOD PRESSURE: 43 MMHG | HEART RATE: 56 BPM

## 2024-05-11 VITALS
RESPIRATION RATE: 12 BRPM | SYSTOLIC BLOOD PRESSURE: 111 MMHG | OXYGEN SATURATION: 96 % | HEART RATE: 53 BPM | DIASTOLIC BLOOD PRESSURE: 38 MMHG

## 2024-05-11 VITALS
DIASTOLIC BLOOD PRESSURE: 46 MMHG | HEART RATE: 56 BPM | RESPIRATION RATE: 10 BRPM | SYSTOLIC BLOOD PRESSURE: 153 MMHG | OXYGEN SATURATION: 96 %

## 2024-05-11 VITALS
DIASTOLIC BLOOD PRESSURE: 40 MMHG | OXYGEN SATURATION: 98 % | SYSTOLIC BLOOD PRESSURE: 97 MMHG | HEART RATE: 52 BPM | RESPIRATION RATE: 12 BRPM

## 2024-05-11 VITALS
DIASTOLIC BLOOD PRESSURE: 37 MMHG | HEART RATE: 55 BPM | OXYGEN SATURATION: 96 % | RESPIRATION RATE: 11 BRPM | SYSTOLIC BLOOD PRESSURE: 112 MMHG

## 2024-05-11 VITALS
OXYGEN SATURATION: 99 % | DIASTOLIC BLOOD PRESSURE: 42 MMHG | HEART RATE: 48 BPM | RESPIRATION RATE: 11 BRPM | SYSTOLIC BLOOD PRESSURE: 128 MMHG

## 2024-05-11 VITALS
RESPIRATION RATE: 13 BRPM | HEART RATE: 62 BPM | SYSTOLIC BLOOD PRESSURE: 117 MMHG | DIASTOLIC BLOOD PRESSURE: 47 MMHG | OXYGEN SATURATION: 93 %

## 2024-05-11 VITALS
HEART RATE: 56 BPM | DIASTOLIC BLOOD PRESSURE: 28 MMHG | OXYGEN SATURATION: 99 % | SYSTOLIC BLOOD PRESSURE: 110 MMHG | RESPIRATION RATE: 11 BRPM

## 2024-05-11 VITALS
OXYGEN SATURATION: 100 % | SYSTOLIC BLOOD PRESSURE: 104 MMHG | RESPIRATION RATE: 12 BRPM | DIASTOLIC BLOOD PRESSURE: 47 MMHG | HEART RATE: 58 BPM

## 2024-05-11 VITALS
OXYGEN SATURATION: 99 % | SYSTOLIC BLOOD PRESSURE: 144 MMHG | DIASTOLIC BLOOD PRESSURE: 39 MMHG | RESPIRATION RATE: 10 BRPM | HEART RATE: 57 BPM

## 2024-05-11 VITALS
OXYGEN SATURATION: 93 % | SYSTOLIC BLOOD PRESSURE: 118 MMHG | HEART RATE: 56 BPM | RESPIRATION RATE: 10 BRPM | DIASTOLIC BLOOD PRESSURE: 37 MMHG | TEMPERATURE: 97.3 F

## 2024-05-11 VITALS
RESPIRATION RATE: 16 BRPM | DIASTOLIC BLOOD PRESSURE: 18 MMHG | SYSTOLIC BLOOD PRESSURE: 163 MMHG | HEART RATE: 56 BPM | OXYGEN SATURATION: 97 %

## 2024-05-11 VITALS
OXYGEN SATURATION: 99 % | DIASTOLIC BLOOD PRESSURE: 104 MMHG | RESPIRATION RATE: 10 BRPM | HEART RATE: 46 BPM | SYSTOLIC BLOOD PRESSURE: 129 MMHG

## 2024-05-11 VITALS
RESPIRATION RATE: 9 BRPM | OXYGEN SATURATION: 100 % | HEART RATE: 48 BPM | DIASTOLIC BLOOD PRESSURE: 26 MMHG | SYSTOLIC BLOOD PRESSURE: 116 MMHG

## 2024-05-11 VITALS
DIASTOLIC BLOOD PRESSURE: 43 MMHG | OXYGEN SATURATION: 100 % | HEART RATE: 55 BPM | SYSTOLIC BLOOD PRESSURE: 130 MMHG | RESPIRATION RATE: 12 BRPM

## 2024-05-11 VITALS
RESPIRATION RATE: 12 BRPM | HEART RATE: 55 BPM | DIASTOLIC BLOOD PRESSURE: 43 MMHG | OXYGEN SATURATION: 100 % | SYSTOLIC BLOOD PRESSURE: 121 MMHG

## 2024-05-11 VITALS
RESPIRATION RATE: 10 BRPM | OXYGEN SATURATION: 100 % | SYSTOLIC BLOOD PRESSURE: 108 MMHG | DIASTOLIC BLOOD PRESSURE: 29 MMHG | HEART RATE: 55 BPM

## 2024-05-11 VITALS
RESPIRATION RATE: 12 BRPM | HEART RATE: 59 BPM | OXYGEN SATURATION: 100 % | DIASTOLIC BLOOD PRESSURE: 46 MMHG | SYSTOLIC BLOOD PRESSURE: 137 MMHG

## 2024-05-11 VITALS
HEART RATE: 58 BPM | OXYGEN SATURATION: 99 % | DIASTOLIC BLOOD PRESSURE: 42 MMHG | RESPIRATION RATE: 10 BRPM | SYSTOLIC BLOOD PRESSURE: 129 MMHG

## 2024-05-11 VITALS
SYSTOLIC BLOOD PRESSURE: 121 MMHG | HEART RATE: 50 BPM | OXYGEN SATURATION: 98 % | RESPIRATION RATE: 10 BRPM | TEMPERATURE: 97.8 F | DIASTOLIC BLOOD PRESSURE: 37 MMHG

## 2024-05-11 VITALS
HEART RATE: 54 BPM | OXYGEN SATURATION: 99 % | DIASTOLIC BLOOD PRESSURE: 52 MMHG | SYSTOLIC BLOOD PRESSURE: 138 MMHG | RESPIRATION RATE: 12 BRPM

## 2024-05-11 VITALS
RESPIRATION RATE: 12 BRPM | OXYGEN SATURATION: 100 % | DIASTOLIC BLOOD PRESSURE: 45 MMHG | SYSTOLIC BLOOD PRESSURE: 83 MMHG | HEART RATE: 50 BPM

## 2024-05-11 VITALS
OXYGEN SATURATION: 100 % | SYSTOLIC BLOOD PRESSURE: 86 MMHG | HEART RATE: 58 BPM | RESPIRATION RATE: 10 BRPM | DIASTOLIC BLOOD PRESSURE: 56 MMHG

## 2024-05-11 VITALS
OXYGEN SATURATION: 99 % | SYSTOLIC BLOOD PRESSURE: 123 MMHG | RESPIRATION RATE: 9 BRPM | DIASTOLIC BLOOD PRESSURE: 38 MMHG | HEART RATE: 54 BPM

## 2024-05-11 VITALS
HEART RATE: 54 BPM | SYSTOLIC BLOOD PRESSURE: 94 MMHG | DIASTOLIC BLOOD PRESSURE: 44 MMHG | OXYGEN SATURATION: 100 % | RESPIRATION RATE: 11 BRPM

## 2024-05-11 VITALS
DIASTOLIC BLOOD PRESSURE: 23 MMHG | SYSTOLIC BLOOD PRESSURE: 95 MMHG | RESPIRATION RATE: 13 BRPM | HEART RATE: 50 BPM | OXYGEN SATURATION: 78 %

## 2024-05-11 VITALS — RESPIRATION RATE: 9 BRPM | HEART RATE: 48 BPM | OXYGEN SATURATION: 100 %

## 2024-05-11 VITALS
DIASTOLIC BLOOD PRESSURE: 41 MMHG | RESPIRATION RATE: 9 BRPM | SYSTOLIC BLOOD PRESSURE: 125 MMHG | OXYGEN SATURATION: 100 % | HEART RATE: 54 BPM

## 2024-05-11 VITALS
RESPIRATION RATE: 11 BRPM | SYSTOLIC BLOOD PRESSURE: 94 MMHG | OXYGEN SATURATION: 100 % | HEART RATE: 57 BPM | DIASTOLIC BLOOD PRESSURE: 45 MMHG

## 2024-05-11 VITALS
RESPIRATION RATE: 13 BRPM | DIASTOLIC BLOOD PRESSURE: 33 MMHG | OXYGEN SATURATION: 100 % | SYSTOLIC BLOOD PRESSURE: 113 MMHG | HEART RATE: 47 BPM

## 2024-05-11 VITALS
DIASTOLIC BLOOD PRESSURE: 46 MMHG | OXYGEN SATURATION: 100 % | HEART RATE: 56 BPM | RESPIRATION RATE: 11 BRPM | SYSTOLIC BLOOD PRESSURE: 67 MMHG

## 2024-05-11 VITALS
OXYGEN SATURATION: 100 % | HEART RATE: 51 BPM | SYSTOLIC BLOOD PRESSURE: 121 MMHG | RESPIRATION RATE: 12 BRPM | DIASTOLIC BLOOD PRESSURE: 49 MMHG

## 2024-05-11 VITALS
OXYGEN SATURATION: 100 % | DIASTOLIC BLOOD PRESSURE: 40 MMHG | SYSTOLIC BLOOD PRESSURE: 129 MMHG | RESPIRATION RATE: 10 BRPM | HEART RATE: 56 BPM

## 2024-05-11 VITALS
HEART RATE: 53 BPM | RESPIRATION RATE: 10 BRPM | DIASTOLIC BLOOD PRESSURE: 33 MMHG | OXYGEN SATURATION: 99 % | SYSTOLIC BLOOD PRESSURE: 144 MMHG

## 2024-05-11 VITALS
DIASTOLIC BLOOD PRESSURE: 57 MMHG | OXYGEN SATURATION: 100 % | RESPIRATION RATE: 11 BRPM | SYSTOLIC BLOOD PRESSURE: 82 MMHG | HEART RATE: 58 BPM

## 2024-05-11 VITALS — HEART RATE: 49 BPM | RESPIRATION RATE: 12 BRPM | OXYGEN SATURATION: 100 %

## 2024-05-11 LAB
ANION GAP SERPL CALCULATED.3IONS-SCNC: 5 MMOL/L (ref 5–15)
APTT PPP: 59.3 SEC (ref 24.5–34.5)
APTT PPP: 67.2 SEC (ref 24.5–34.5)
BUN SERPL-MCNC: 27 MG/DL (ref 9–23)
BUN/CREAT SERPL: 17.3 (ref 10–20)
CALCIUM SERPL-MCNC: 8.4 MG/DL (ref 8.7–10.4)
CHLORIDE SERPL-SCNC: 103 MMOL/L (ref 98–107)
CO2 SERPL-SCNC: 27 MMOL/L (ref 20–30)
GLUCOSE SERPL-MCNC: 117 MG/DL (ref 74–106)
HCT VFR BLD AUTO: 32.9 % (ref 41–53)
HGB BLD-MCNC: 10.5 G/DL (ref 13.5–17.5)
INR PPP: 1.06 (ref 0.9–1.15)
INR PPP: 1.07 (ref 0.9–1.15)
MCH RBC QN AUTO: 28.4 PG (ref 28–32)
MCV RBC AUTO: 88.9 FL (ref 80–100)
NRBC BLD QL AUTO: 0 %
POTASSIUM SERPL-SCNC: 3.9 MMOL/L (ref 3.5–5.1)
PROTHROMBIN TIME: 11.2 SEC (ref 9.3–11.8)
PROTHROMBIN TIME: 11.3 SEC (ref 9.3–11.8)
SODIUM SERPL-SCNC: 135 MMOL/L (ref 136–145)

## 2024-05-11 RX ADMIN — MAGNESIUM OXIDE TAB 400 MG (241.3 MG ELEMENTAL MG) SCH MG: 400 (241.3 MG) TAB at 08:35

## 2024-05-12 VITALS
SYSTOLIC BLOOD PRESSURE: 118 MMHG | HEART RATE: 49 BPM | OXYGEN SATURATION: 98 % | TEMPERATURE: 98 F | DIASTOLIC BLOOD PRESSURE: 33 MMHG | RESPIRATION RATE: 15 BRPM

## 2024-05-12 VITALS
OXYGEN SATURATION: 99 % | HEART RATE: 69 BPM | TEMPERATURE: 97.8 F | DIASTOLIC BLOOD PRESSURE: 55 MMHG | SYSTOLIC BLOOD PRESSURE: 124 MMHG | RESPIRATION RATE: 12 BRPM

## 2024-05-12 VITALS
OXYGEN SATURATION: 95 % | RESPIRATION RATE: 11 BRPM | DIASTOLIC BLOOD PRESSURE: 44 MMHG | HEART RATE: 52 BPM | SYSTOLIC BLOOD PRESSURE: 119 MMHG

## 2024-05-12 VITALS
DIASTOLIC BLOOD PRESSURE: 42 MMHG | RESPIRATION RATE: 13 BRPM | OXYGEN SATURATION: 100 % | SYSTOLIC BLOOD PRESSURE: 132 MMHG | HEART RATE: 54 BPM

## 2024-05-12 VITALS
DIASTOLIC BLOOD PRESSURE: 52 MMHG | OXYGEN SATURATION: 97 % | SYSTOLIC BLOOD PRESSURE: 139 MMHG | RESPIRATION RATE: 9 BRPM | HEART RATE: 62 BPM

## 2024-05-12 VITALS
OXYGEN SATURATION: 97 % | DIASTOLIC BLOOD PRESSURE: 33 MMHG | RESPIRATION RATE: 9 BRPM | SYSTOLIC BLOOD PRESSURE: 109 MMHG | HEART RATE: 55 BPM

## 2024-05-12 VITALS — OXYGEN SATURATION: 94 % | HEART RATE: 50 BPM | RESPIRATION RATE: 21 BRPM

## 2024-05-12 VITALS
OXYGEN SATURATION: 100 % | DIASTOLIC BLOOD PRESSURE: 47 MMHG | RESPIRATION RATE: 13 BRPM | HEART RATE: 60 BPM | SYSTOLIC BLOOD PRESSURE: 142 MMHG

## 2024-05-12 VITALS
SYSTOLIC BLOOD PRESSURE: 136 MMHG | RESPIRATION RATE: 10 BRPM | OXYGEN SATURATION: 100 % | HEART RATE: 49 BPM | DIASTOLIC BLOOD PRESSURE: 34 MMHG

## 2024-05-12 VITALS — RESPIRATION RATE: 14 BRPM | HEART RATE: 50 BPM | OXYGEN SATURATION: 100 %

## 2024-05-12 VITALS
RESPIRATION RATE: 11 BRPM | SYSTOLIC BLOOD PRESSURE: 123 MMHG | OXYGEN SATURATION: 97 % | HEART RATE: 52 BPM | DIASTOLIC BLOOD PRESSURE: 48 MMHG

## 2024-05-12 VITALS
SYSTOLIC BLOOD PRESSURE: 112 MMHG | RESPIRATION RATE: 11 BRPM | OXYGEN SATURATION: 96 % | DIASTOLIC BLOOD PRESSURE: 50 MMHG | HEART RATE: 50 BPM

## 2024-05-12 VITALS
RESPIRATION RATE: 11 BRPM | SYSTOLIC BLOOD PRESSURE: 116 MMHG | OXYGEN SATURATION: 100 % | DIASTOLIC BLOOD PRESSURE: 43 MMHG | HEART RATE: 52 BPM

## 2024-05-12 VITALS
SYSTOLIC BLOOD PRESSURE: 132 MMHG | OXYGEN SATURATION: 98 % | DIASTOLIC BLOOD PRESSURE: 55 MMHG | RESPIRATION RATE: 9 BRPM | HEART RATE: 47 BPM

## 2024-05-12 VITALS
OXYGEN SATURATION: 97 % | HEART RATE: 54 BPM | SYSTOLIC BLOOD PRESSURE: 111 MMHG | RESPIRATION RATE: 9 BRPM | DIASTOLIC BLOOD PRESSURE: 70 MMHG

## 2024-05-12 VITALS
OXYGEN SATURATION: 100 % | RESPIRATION RATE: 12 BRPM | SYSTOLIC BLOOD PRESSURE: 139 MMHG | HEART RATE: 61 BPM | DIASTOLIC BLOOD PRESSURE: 49 MMHG

## 2024-05-12 VITALS
HEART RATE: 56 BPM | DIASTOLIC BLOOD PRESSURE: 33 MMHG | OXYGEN SATURATION: 96 % | RESPIRATION RATE: 11 BRPM | SYSTOLIC BLOOD PRESSURE: 108 MMHG

## 2024-05-12 VITALS
DIASTOLIC BLOOD PRESSURE: 50 MMHG | HEART RATE: 56 BPM | SYSTOLIC BLOOD PRESSURE: 113 MMHG | OXYGEN SATURATION: 98 % | RESPIRATION RATE: 11 BRPM

## 2024-05-12 VITALS
SYSTOLIC BLOOD PRESSURE: 142 MMHG | RESPIRATION RATE: 8 BRPM | OXYGEN SATURATION: 98 % | DIASTOLIC BLOOD PRESSURE: 53 MMHG | HEART RATE: 49 BPM

## 2024-05-12 VITALS
HEART RATE: 49 BPM | SYSTOLIC BLOOD PRESSURE: 131 MMHG | TEMPERATURE: 97.7 F | RESPIRATION RATE: 11 BRPM | OXYGEN SATURATION: 95 % | DIASTOLIC BLOOD PRESSURE: 44 MMHG

## 2024-05-12 VITALS
DIASTOLIC BLOOD PRESSURE: 44 MMHG | HEART RATE: 54 BPM | RESPIRATION RATE: 12 BRPM | SYSTOLIC BLOOD PRESSURE: 161 MMHG | OXYGEN SATURATION: 100 %

## 2024-05-12 VITALS
DIASTOLIC BLOOD PRESSURE: 56 MMHG | HEART RATE: 46 BPM | RESPIRATION RATE: 9 BRPM | OXYGEN SATURATION: 100 % | SYSTOLIC BLOOD PRESSURE: 142 MMHG

## 2024-05-12 VITALS — HEART RATE: 46 BPM | RESPIRATION RATE: 21 BRPM | OXYGEN SATURATION: 94 %

## 2024-05-12 VITALS
HEART RATE: 54 BPM | SYSTOLIC BLOOD PRESSURE: 113 MMHG | OXYGEN SATURATION: 99 % | RESPIRATION RATE: 11 BRPM | DIASTOLIC BLOOD PRESSURE: 89 MMHG

## 2024-05-12 VITALS
OXYGEN SATURATION: 100 % | RESPIRATION RATE: 12 BRPM | SYSTOLIC BLOOD PRESSURE: 130 MMHG | HEART RATE: 64 BPM | DIASTOLIC BLOOD PRESSURE: 55 MMHG

## 2024-05-12 VITALS
DIASTOLIC BLOOD PRESSURE: 40 MMHG | RESPIRATION RATE: 11 BRPM | OXYGEN SATURATION: 100 % | HEART RATE: 69 BPM | SYSTOLIC BLOOD PRESSURE: 99 MMHG

## 2024-05-12 VITALS
RESPIRATION RATE: 15 BRPM | DIASTOLIC BLOOD PRESSURE: 39 MMHG | HEART RATE: 57 BPM | OXYGEN SATURATION: 98 % | SYSTOLIC BLOOD PRESSURE: 156 MMHG

## 2024-05-12 VITALS — HEART RATE: 60 BPM | OXYGEN SATURATION: 94 % | RESPIRATION RATE: 15 BRPM

## 2024-05-12 VITALS
SYSTOLIC BLOOD PRESSURE: 108 MMHG | OXYGEN SATURATION: 97 % | DIASTOLIC BLOOD PRESSURE: 52 MMHG | RESPIRATION RATE: 11 BRPM | HEART RATE: 54 BPM

## 2024-05-12 VITALS
OXYGEN SATURATION: 100 % | RESPIRATION RATE: 8 BRPM | HEART RATE: 49 BPM | DIASTOLIC BLOOD PRESSURE: 36 MMHG | SYSTOLIC BLOOD PRESSURE: 117 MMHG

## 2024-05-12 VITALS
OXYGEN SATURATION: 98 % | HEART RATE: 51 BPM | SYSTOLIC BLOOD PRESSURE: 96 MMHG | DIASTOLIC BLOOD PRESSURE: 56 MMHG | RESPIRATION RATE: 12 BRPM

## 2024-05-12 VITALS — RESPIRATION RATE: 11 BRPM | SYSTOLIC BLOOD PRESSURE: 131 MMHG | DIASTOLIC BLOOD PRESSURE: 44 MMHG | HEART RATE: 49 BPM

## 2024-05-12 VITALS — HEART RATE: 54 BPM | OXYGEN SATURATION: 98 % | RESPIRATION RATE: 21 BRPM

## 2024-05-12 VITALS
DIASTOLIC BLOOD PRESSURE: 51 MMHG | HEART RATE: 71 BPM | SYSTOLIC BLOOD PRESSURE: 120 MMHG | OXYGEN SATURATION: 95 % | RESPIRATION RATE: 13 BRPM

## 2024-05-12 VITALS
SYSTOLIC BLOOD PRESSURE: 100 MMHG | DIASTOLIC BLOOD PRESSURE: 49 MMHG | OXYGEN SATURATION: 99 % | HEART RATE: 47 BPM | RESPIRATION RATE: 12 BRPM

## 2024-05-12 VITALS
HEART RATE: 68 BPM | OXYGEN SATURATION: 100 % | RESPIRATION RATE: 10 BRPM | SYSTOLIC BLOOD PRESSURE: 118 MMHG | DIASTOLIC BLOOD PRESSURE: 41 MMHG

## 2024-05-12 VITALS
OXYGEN SATURATION: 87 % | SYSTOLIC BLOOD PRESSURE: 121 MMHG | RESPIRATION RATE: 9 BRPM | HEART RATE: 72 BPM | DIASTOLIC BLOOD PRESSURE: 53 MMHG

## 2024-05-12 VITALS
DIASTOLIC BLOOD PRESSURE: 43 MMHG | OXYGEN SATURATION: 98 % | RESPIRATION RATE: 12 BRPM | SYSTOLIC BLOOD PRESSURE: 123 MMHG | HEART RATE: 52 BPM

## 2024-05-12 VITALS
OXYGEN SATURATION: 95 % | HEART RATE: 56 BPM | DIASTOLIC BLOOD PRESSURE: 55 MMHG | RESPIRATION RATE: 13 BRPM | SYSTOLIC BLOOD PRESSURE: 121 MMHG

## 2024-05-12 VITALS
OXYGEN SATURATION: 98 % | SYSTOLIC BLOOD PRESSURE: 103 MMHG | RESPIRATION RATE: 13 BRPM | DIASTOLIC BLOOD PRESSURE: 49 MMHG | HEART RATE: 54 BPM

## 2024-05-12 VITALS — HEART RATE: 49 BPM | RESPIRATION RATE: 14 BRPM | OXYGEN SATURATION: 100 %

## 2024-05-12 VITALS
RESPIRATION RATE: 11 BRPM | HEART RATE: 57 BPM | DIASTOLIC BLOOD PRESSURE: 60 MMHG | SYSTOLIC BLOOD PRESSURE: 111 MMHG | OXYGEN SATURATION: 100 %

## 2024-05-12 VITALS
SYSTOLIC BLOOD PRESSURE: 83 MMHG | RESPIRATION RATE: 9 BRPM | HEART RATE: 51 BPM | DIASTOLIC BLOOD PRESSURE: 26 MMHG | OXYGEN SATURATION: 100 %

## 2024-05-12 VITALS
RESPIRATION RATE: 11 BRPM | OXYGEN SATURATION: 97 % | DIASTOLIC BLOOD PRESSURE: 42 MMHG | HEART RATE: 53 BPM | SYSTOLIC BLOOD PRESSURE: 103 MMHG

## 2024-05-12 VITALS
SYSTOLIC BLOOD PRESSURE: 103 MMHG | HEART RATE: 57 BPM | OXYGEN SATURATION: 97 % | RESPIRATION RATE: 10 BRPM | DIASTOLIC BLOOD PRESSURE: 43 MMHG

## 2024-05-12 VITALS
HEART RATE: 65 BPM | RESPIRATION RATE: 23 BRPM | DIASTOLIC BLOOD PRESSURE: 49 MMHG | SYSTOLIC BLOOD PRESSURE: 131 MMHG | OXYGEN SATURATION: 100 %

## 2024-05-12 VITALS — RESPIRATION RATE: 11 BRPM | OXYGEN SATURATION: 99 % | HEART RATE: 49 BPM

## 2024-05-12 VITALS
SYSTOLIC BLOOD PRESSURE: 129 MMHG | OXYGEN SATURATION: 99 % | RESPIRATION RATE: 18 BRPM | DIASTOLIC BLOOD PRESSURE: 45 MMHG | HEART RATE: 48 BPM

## 2024-05-12 VITALS
OXYGEN SATURATION: 100 % | DIASTOLIC BLOOD PRESSURE: 40 MMHG | SYSTOLIC BLOOD PRESSURE: 120 MMHG | HEART RATE: 50 BPM | RESPIRATION RATE: 8 BRPM

## 2024-05-12 VITALS
RESPIRATION RATE: 12 BRPM | HEART RATE: 50 BPM | DIASTOLIC BLOOD PRESSURE: 26 MMHG | SYSTOLIC BLOOD PRESSURE: 109 MMHG | OXYGEN SATURATION: 94 %

## 2024-05-12 VITALS
SYSTOLIC BLOOD PRESSURE: 110 MMHG | DIASTOLIC BLOOD PRESSURE: 36 MMHG | OXYGEN SATURATION: 98 % | HEART RATE: 53 BPM | RESPIRATION RATE: 10 BRPM

## 2024-05-12 VITALS
OXYGEN SATURATION: 100 % | DIASTOLIC BLOOD PRESSURE: 48 MMHG | RESPIRATION RATE: 13 BRPM | HEART RATE: 52 BPM | SYSTOLIC BLOOD PRESSURE: 121 MMHG

## 2024-05-12 VITALS
HEART RATE: 54 BPM | OXYGEN SATURATION: 99 % | SYSTOLIC BLOOD PRESSURE: 117 MMHG | DIASTOLIC BLOOD PRESSURE: 37 MMHG | RESPIRATION RATE: 10 BRPM

## 2024-05-12 VITALS
SYSTOLIC BLOOD PRESSURE: 133 MMHG | HEART RATE: 54 BPM | OXYGEN SATURATION: 100 % | DIASTOLIC BLOOD PRESSURE: 51 MMHG | RESPIRATION RATE: 11 BRPM

## 2024-05-12 VITALS
RESPIRATION RATE: 13 BRPM | HEART RATE: 61 BPM | OXYGEN SATURATION: 100 % | DIASTOLIC BLOOD PRESSURE: 46 MMHG | SYSTOLIC BLOOD PRESSURE: 137 MMHG

## 2024-05-12 VITALS
RESPIRATION RATE: 12 BRPM | OXYGEN SATURATION: 96 % | SYSTOLIC BLOOD PRESSURE: 140 MMHG | HEART RATE: 53 BPM | DIASTOLIC BLOOD PRESSURE: 27 MMHG

## 2024-05-12 VITALS — RESPIRATION RATE: 10 BRPM | OXYGEN SATURATION: 99 % | HEART RATE: 46 BPM

## 2024-05-12 VITALS
OXYGEN SATURATION: 98 % | TEMPERATURE: 97.9 F | SYSTOLIC BLOOD PRESSURE: 109 MMHG | RESPIRATION RATE: 11 BRPM | HEART RATE: 55 BPM | DIASTOLIC BLOOD PRESSURE: 64 MMHG

## 2024-05-12 VITALS
OXYGEN SATURATION: 100 % | SYSTOLIC BLOOD PRESSURE: 114 MMHG | HEART RATE: 53 BPM | DIASTOLIC BLOOD PRESSURE: 40 MMHG | RESPIRATION RATE: 16 BRPM

## 2024-05-12 VITALS
RESPIRATION RATE: 12 BRPM | OXYGEN SATURATION: 91 % | DIASTOLIC BLOOD PRESSURE: 36 MMHG | SYSTOLIC BLOOD PRESSURE: 126 MMHG | HEART RATE: 51 BPM

## 2024-05-12 VITALS
OXYGEN SATURATION: 98 % | HEART RATE: 48 BPM | DIASTOLIC BLOOD PRESSURE: 27 MMHG | RESPIRATION RATE: 12 BRPM | SYSTOLIC BLOOD PRESSURE: 140 MMHG

## 2024-05-12 VITALS
DIASTOLIC BLOOD PRESSURE: 44 MMHG | HEART RATE: 60 BPM | SYSTOLIC BLOOD PRESSURE: 140 MMHG | RESPIRATION RATE: 13 BRPM | OXYGEN SATURATION: 97 %

## 2024-05-12 VITALS — OXYGEN SATURATION: 100 % | HEART RATE: 47 BPM | RESPIRATION RATE: 8 BRPM

## 2024-05-12 VITALS
OXYGEN SATURATION: 100 % | RESPIRATION RATE: 13 BRPM | SYSTOLIC BLOOD PRESSURE: 131 MMHG | HEART RATE: 51 BPM | DIASTOLIC BLOOD PRESSURE: 39 MMHG

## 2024-05-12 VITALS
DIASTOLIC BLOOD PRESSURE: 47 MMHG | HEART RATE: 71 BPM | SYSTOLIC BLOOD PRESSURE: 131 MMHG | OXYGEN SATURATION: 100 % | RESPIRATION RATE: 11 BRPM

## 2024-05-12 VITALS — RESPIRATION RATE: 12 BRPM | HEART RATE: 51 BPM | OXYGEN SATURATION: 99 %

## 2024-05-12 VITALS
SYSTOLIC BLOOD PRESSURE: 105 MMHG | HEART RATE: 52 BPM | OXYGEN SATURATION: 99 % | DIASTOLIC BLOOD PRESSURE: 41 MMHG | RESPIRATION RATE: 10 BRPM

## 2024-05-12 VITALS
RESPIRATION RATE: 9 BRPM | HEART RATE: 49 BPM | DIASTOLIC BLOOD PRESSURE: 76 MMHG | OXYGEN SATURATION: 99 % | SYSTOLIC BLOOD PRESSURE: 105 MMHG

## 2024-05-12 VITALS
DIASTOLIC BLOOD PRESSURE: 51 MMHG | OXYGEN SATURATION: 100 % | HEART RATE: 53 BPM | RESPIRATION RATE: 9 BRPM | SYSTOLIC BLOOD PRESSURE: 116 MMHG

## 2024-05-12 VITALS
OXYGEN SATURATION: 99 % | DIASTOLIC BLOOD PRESSURE: 60 MMHG | RESPIRATION RATE: 12 BRPM | SYSTOLIC BLOOD PRESSURE: 102 MMHG | HEART RATE: 51 BPM

## 2024-05-12 VITALS — HEART RATE: 51 BPM | OXYGEN SATURATION: 100 % | RESPIRATION RATE: 12 BRPM

## 2024-05-12 VITALS
RESPIRATION RATE: 10 BRPM | OXYGEN SATURATION: 98 % | DIASTOLIC BLOOD PRESSURE: 45 MMHG | SYSTOLIC BLOOD PRESSURE: 88 MMHG | HEART RATE: 55 BPM

## 2024-05-12 VITALS
HEART RATE: 48 BPM | RESPIRATION RATE: 12 BRPM | OXYGEN SATURATION: 100 % | SYSTOLIC BLOOD PRESSURE: 135 MMHG | DIASTOLIC BLOOD PRESSURE: 43 MMHG

## 2024-05-12 VITALS
OXYGEN SATURATION: 89 % | HEART RATE: 75 BPM | SYSTOLIC BLOOD PRESSURE: 127 MMHG | RESPIRATION RATE: 11 BRPM | DIASTOLIC BLOOD PRESSURE: 41 MMHG

## 2024-05-12 VITALS
DIASTOLIC BLOOD PRESSURE: 48 MMHG | OXYGEN SATURATION: 100 % | HEART RATE: 53 BPM | RESPIRATION RATE: 11 BRPM | SYSTOLIC BLOOD PRESSURE: 120 MMHG

## 2024-05-12 VITALS — RESPIRATION RATE: 13 BRPM | OXYGEN SATURATION: 99 % | HEART RATE: 48 BPM

## 2024-05-12 VITALS
DIASTOLIC BLOOD PRESSURE: 48 MMHG | OXYGEN SATURATION: 100 % | SYSTOLIC BLOOD PRESSURE: 129 MMHG | HEART RATE: 59 BPM | RESPIRATION RATE: 14 BRPM

## 2024-05-12 VITALS
HEART RATE: 59 BPM | RESPIRATION RATE: 13 BRPM | SYSTOLIC BLOOD PRESSURE: 192 MMHG | OXYGEN SATURATION: 96 % | DIASTOLIC BLOOD PRESSURE: 161 MMHG

## 2024-05-12 VITALS
HEART RATE: 58 BPM | RESPIRATION RATE: 12 BRPM | OXYGEN SATURATION: 95 % | SYSTOLIC BLOOD PRESSURE: 126 MMHG | DIASTOLIC BLOOD PRESSURE: 43 MMHG

## 2024-05-12 VITALS
HEART RATE: 53 BPM | DIASTOLIC BLOOD PRESSURE: 46 MMHG | RESPIRATION RATE: 11 BRPM | SYSTOLIC BLOOD PRESSURE: 111 MMHG | OXYGEN SATURATION: 100 %

## 2024-05-12 VITALS
SYSTOLIC BLOOD PRESSURE: 102 MMHG | RESPIRATION RATE: 12 BRPM | OXYGEN SATURATION: 98 % | DIASTOLIC BLOOD PRESSURE: 42 MMHG | HEART RATE: 46 BPM

## 2024-05-12 VITALS
RESPIRATION RATE: 15 BRPM | SYSTOLIC BLOOD PRESSURE: 117 MMHG | HEART RATE: 60 BPM | OXYGEN SATURATION: 86 % | DIASTOLIC BLOOD PRESSURE: 101 MMHG

## 2024-05-12 VITALS
DIASTOLIC BLOOD PRESSURE: 32 MMHG | SYSTOLIC BLOOD PRESSURE: 101 MMHG | RESPIRATION RATE: 10 BRPM | OXYGEN SATURATION: 99 % | HEART RATE: 46 BPM

## 2024-05-12 VITALS — HEART RATE: 49 BPM | SYSTOLIC BLOOD PRESSURE: 120 MMHG | RESPIRATION RATE: 9 BRPM | DIASTOLIC BLOOD PRESSURE: 39 MMHG

## 2024-05-12 VITALS
SYSTOLIC BLOOD PRESSURE: 109 MMHG | OXYGEN SATURATION: 100 % | RESPIRATION RATE: 12 BRPM | HEART RATE: 57 BPM | DIASTOLIC BLOOD PRESSURE: 55 MMHG

## 2024-05-12 VITALS
OXYGEN SATURATION: 99 % | HEART RATE: 56 BPM | SYSTOLIC BLOOD PRESSURE: 112 MMHG | DIASTOLIC BLOOD PRESSURE: 26 MMHG | RESPIRATION RATE: 12 BRPM

## 2024-05-12 VITALS — RESPIRATION RATE: 20 BRPM | OXYGEN SATURATION: 99 % | HEART RATE: 54 BPM

## 2024-05-12 VITALS
DIASTOLIC BLOOD PRESSURE: 65 MMHG | HEART RATE: 46 BPM | OXYGEN SATURATION: 100 % | SYSTOLIC BLOOD PRESSURE: 153 MMHG | RESPIRATION RATE: 9 BRPM

## 2024-05-12 VITALS
HEART RATE: 58 BPM | RESPIRATION RATE: 13 BRPM | OXYGEN SATURATION: 100 % | SYSTOLIC BLOOD PRESSURE: 131 MMHG | DIASTOLIC BLOOD PRESSURE: 49 MMHG

## 2024-05-12 VITALS
DIASTOLIC BLOOD PRESSURE: 39 MMHG | OXYGEN SATURATION: 99 % | TEMPERATURE: 97.5 F | SYSTOLIC BLOOD PRESSURE: 100 MMHG | RESPIRATION RATE: 13 BRPM | HEART RATE: 53 BPM

## 2024-05-12 VITALS
HEART RATE: 49 BPM | SYSTOLIC BLOOD PRESSURE: 114 MMHG | TEMPERATURE: 97.7 F | OXYGEN SATURATION: 99 % | RESPIRATION RATE: 9 BRPM | DIASTOLIC BLOOD PRESSURE: 81 MMHG

## 2024-05-12 VITALS
OXYGEN SATURATION: 100 % | DIASTOLIC BLOOD PRESSURE: 47 MMHG | HEART RATE: 71 BPM | RESPIRATION RATE: 11 BRPM | SYSTOLIC BLOOD PRESSURE: 110 MMHG

## 2024-05-12 VITALS
HEART RATE: 53 BPM | OXYGEN SATURATION: 97 % | DIASTOLIC BLOOD PRESSURE: 41 MMHG | SYSTOLIC BLOOD PRESSURE: 106 MMHG | RESPIRATION RATE: 12 BRPM

## 2024-05-12 VITALS — OXYGEN SATURATION: 99 % | HEART RATE: 54 BPM | RESPIRATION RATE: 14 BRPM

## 2024-05-12 VITALS
HEART RATE: 61 BPM | SYSTOLIC BLOOD PRESSURE: 142 MMHG | DIASTOLIC BLOOD PRESSURE: 37 MMHG | RESPIRATION RATE: 10 BRPM | OXYGEN SATURATION: 98 %

## 2024-05-12 VITALS — OXYGEN SATURATION: 100 % | HEART RATE: 49 BPM | RESPIRATION RATE: 14 BRPM

## 2024-05-12 VITALS
DIASTOLIC BLOOD PRESSURE: 44 MMHG | HEART RATE: 52 BPM | SYSTOLIC BLOOD PRESSURE: 125 MMHG | RESPIRATION RATE: 11 BRPM | OXYGEN SATURATION: 95 %

## 2024-05-12 LAB
ALBUMIN SERPL-MCNC: 2.9 G/DL (ref 3.2–4.8)
ALP SERPL-CCNC: 54 U/L (ref 46–116)
ALT SERPL-CCNC: < 9 U/L (ref 7–40)
ANION GAP SERPL CALCULATED.3IONS-SCNC: 4 MMOL/L (ref 5–15)
APTT PPP: 66.6 SEC (ref 24.5–34.5)
BILIRUB SERPL-MCNC: 0.4 MG/DL (ref 0.2–1)
BUN SERPL-MCNC: 22 MG/DL (ref 9–23)
BUN/CREAT SERPL: 17.6 (ref 10–20)
CALCIUM SERPL-MCNC: 8.7 MG/DL (ref 8.7–10.4)
CHLORIDE SERPL-SCNC: 108 MMOL/L (ref 98–107)
CO2 SERPL-SCNC: 27 MMOL/L (ref 20–30)
GLUCOSE SERPL-MCNC: 102 MG/DL (ref 74–106)
HCT VFR BLD AUTO: 30.2 % (ref 41–53)
HGB BLD-MCNC: 9.8 G/DL (ref 13.5–17.5)
INR PPP: 1.07 (ref 0.9–1.15)
MCH RBC QN AUTO: 28.9 PG (ref 28–32)
MCV RBC AUTO: 89 FL (ref 80–100)
NRBC BLD QL AUTO: 0.2 %
POTASSIUM SERPL-SCNC: 4.4 MMOL/L (ref 3.5–5.1)
PROT SERPL-MCNC: 5.5 G/DL (ref 5.7–8.2)
PROTHROMBIN TIME: 11.3 SEC (ref 9.3–11.8)
SODIUM SERPL-SCNC: 139 MMOL/L (ref 136–145)

## 2024-05-12 RX ADMIN — IOHEXOL ONE MG: 350 INJECTION, SOLUTION INTRAVENOUS at 13:05

## 2024-05-13 VITALS
HEART RATE: 75 BPM | OXYGEN SATURATION: 98 % | SYSTOLIC BLOOD PRESSURE: 112 MMHG | RESPIRATION RATE: 17 BRPM | DIASTOLIC BLOOD PRESSURE: 49 MMHG

## 2024-05-13 VITALS
OXYGEN SATURATION: 98 % | DIASTOLIC BLOOD PRESSURE: 89 MMHG | RESPIRATION RATE: 19 BRPM | SYSTOLIC BLOOD PRESSURE: 134 MMHG | HEART RATE: 76 BPM

## 2024-05-13 VITALS
OXYGEN SATURATION: 97 % | RESPIRATION RATE: 25 BRPM | SYSTOLIC BLOOD PRESSURE: 103 MMHG | DIASTOLIC BLOOD PRESSURE: 45 MMHG | HEART RATE: 81 BPM

## 2024-05-13 VITALS
DIASTOLIC BLOOD PRESSURE: 47 MMHG | SYSTOLIC BLOOD PRESSURE: 122 MMHG | HEART RATE: 70 BPM | RESPIRATION RATE: 17 BRPM | OXYGEN SATURATION: 98 %

## 2024-05-13 VITALS
SYSTOLIC BLOOD PRESSURE: 122 MMHG | OXYGEN SATURATION: 97 % | HEART RATE: 75 BPM | DIASTOLIC BLOOD PRESSURE: 39 MMHG | RESPIRATION RATE: 17 BRPM

## 2024-05-13 VITALS
DIASTOLIC BLOOD PRESSURE: 56 MMHG | OXYGEN SATURATION: 99 % | HEART RATE: 78 BPM | RESPIRATION RATE: 14 BRPM | SYSTOLIC BLOOD PRESSURE: 136 MMHG

## 2024-05-13 VITALS
DIASTOLIC BLOOD PRESSURE: 53 MMHG | HEART RATE: 67 BPM | RESPIRATION RATE: 18 BRPM | OXYGEN SATURATION: 99 % | SYSTOLIC BLOOD PRESSURE: 117 MMHG

## 2024-05-13 VITALS
SYSTOLIC BLOOD PRESSURE: 134 MMHG | RESPIRATION RATE: 15 BRPM | DIASTOLIC BLOOD PRESSURE: 53 MMHG | OXYGEN SATURATION: 97 % | HEART RATE: 87 BPM

## 2024-05-13 VITALS
HEART RATE: 76 BPM | OXYGEN SATURATION: 97 % | DIASTOLIC BLOOD PRESSURE: 54 MMHG | RESPIRATION RATE: 13 BRPM | SYSTOLIC BLOOD PRESSURE: 147 MMHG

## 2024-05-13 VITALS — RESPIRATION RATE: 26 BRPM | OXYGEN SATURATION: 91 % | HEART RATE: 80 BPM

## 2024-05-13 VITALS
HEART RATE: 72 BPM | SYSTOLIC BLOOD PRESSURE: 115 MMHG | OXYGEN SATURATION: 98 % | DIASTOLIC BLOOD PRESSURE: 82 MMHG | RESPIRATION RATE: 17 BRPM

## 2024-05-13 VITALS
DIASTOLIC BLOOD PRESSURE: 52 MMHG | TEMPERATURE: 97.9 F | SYSTOLIC BLOOD PRESSURE: 163 MMHG | OXYGEN SATURATION: 97 % | HEART RATE: 80 BPM | RESPIRATION RATE: 14 BRPM

## 2024-05-13 VITALS
SYSTOLIC BLOOD PRESSURE: 103 MMHG | DIASTOLIC BLOOD PRESSURE: 49 MMHG | HEART RATE: 73 BPM | OXYGEN SATURATION: 98 % | RESPIRATION RATE: 20 BRPM

## 2024-05-13 VITALS
TEMPERATURE: 98.7 F | RESPIRATION RATE: 16 BRPM | DIASTOLIC BLOOD PRESSURE: 69 MMHG | OXYGEN SATURATION: 96 % | SYSTOLIC BLOOD PRESSURE: 135 MMHG | HEART RATE: 93 BPM

## 2024-05-13 VITALS
SYSTOLIC BLOOD PRESSURE: 142 MMHG | HEART RATE: 77 BPM | OXYGEN SATURATION: 96 % | RESPIRATION RATE: 16 BRPM | DIASTOLIC BLOOD PRESSURE: 40 MMHG

## 2024-05-13 VITALS
RESPIRATION RATE: 17 BRPM | OXYGEN SATURATION: 98 % | DIASTOLIC BLOOD PRESSURE: 52 MMHG | HEART RATE: 76 BPM | SYSTOLIC BLOOD PRESSURE: 139 MMHG

## 2024-05-13 VITALS
SYSTOLIC BLOOD PRESSURE: 113 MMHG | DIASTOLIC BLOOD PRESSURE: 49 MMHG | HEART RATE: 76 BPM | RESPIRATION RATE: 16 BRPM | OXYGEN SATURATION: 98 %

## 2024-05-13 VITALS — HEART RATE: 72 BPM | RESPIRATION RATE: 15 BRPM | OXYGEN SATURATION: 98 %

## 2024-05-13 VITALS
RESPIRATION RATE: 15 BRPM | DIASTOLIC BLOOD PRESSURE: 67 MMHG | SYSTOLIC BLOOD PRESSURE: 139 MMHG | HEART RATE: 63 BPM | OXYGEN SATURATION: 94 %

## 2024-05-13 VITALS
DIASTOLIC BLOOD PRESSURE: 55 MMHG | OXYGEN SATURATION: 96 % | SYSTOLIC BLOOD PRESSURE: 138 MMHG | HEART RATE: 67 BPM | RESPIRATION RATE: 14 BRPM

## 2024-05-13 VITALS
OXYGEN SATURATION: 98 % | SYSTOLIC BLOOD PRESSURE: 121 MMHG | HEART RATE: 86 BPM | DIASTOLIC BLOOD PRESSURE: 66 MMHG | RESPIRATION RATE: 21 BRPM

## 2024-05-13 VITALS
RESPIRATION RATE: 15 BRPM | OXYGEN SATURATION: 98 % | DIASTOLIC BLOOD PRESSURE: 45 MMHG | HEART RATE: 70 BPM | SYSTOLIC BLOOD PRESSURE: 146 MMHG

## 2024-05-13 VITALS
SYSTOLIC BLOOD PRESSURE: 148 MMHG | DIASTOLIC BLOOD PRESSURE: 71 MMHG | HEART RATE: 95 BPM | OXYGEN SATURATION: 96 % | RESPIRATION RATE: 13 BRPM

## 2024-05-13 VITALS
HEART RATE: 69 BPM | RESPIRATION RATE: 17 BRPM | SYSTOLIC BLOOD PRESSURE: 151 MMHG | DIASTOLIC BLOOD PRESSURE: 47 MMHG | OXYGEN SATURATION: 97 %

## 2024-05-13 VITALS
OXYGEN SATURATION: 94 % | HEART RATE: 65 BPM | SYSTOLIC BLOOD PRESSURE: 180 MMHG | RESPIRATION RATE: 15 BRPM | DIASTOLIC BLOOD PRESSURE: 65 MMHG

## 2024-05-13 VITALS
OXYGEN SATURATION: 96 % | RESPIRATION RATE: 11 BRPM | DIASTOLIC BLOOD PRESSURE: 51 MMHG | SYSTOLIC BLOOD PRESSURE: 133 MMHG | HEART RATE: 74 BPM

## 2024-05-13 VITALS
HEART RATE: 63 BPM | SYSTOLIC BLOOD PRESSURE: 101 MMHG | DIASTOLIC BLOOD PRESSURE: 39 MMHG | RESPIRATION RATE: 21 BRPM | OXYGEN SATURATION: 97 %

## 2024-05-13 VITALS
DIASTOLIC BLOOD PRESSURE: 57 MMHG | RESPIRATION RATE: 17 BRPM | HEART RATE: 79 BPM | SYSTOLIC BLOOD PRESSURE: 99 MMHG | OXYGEN SATURATION: 96 %

## 2024-05-13 VITALS
RESPIRATION RATE: 15 BRPM | DIASTOLIC BLOOD PRESSURE: 70 MMHG | HEART RATE: 97 BPM | SYSTOLIC BLOOD PRESSURE: 136 MMHG | OXYGEN SATURATION: 90 %

## 2024-05-13 VITALS
SYSTOLIC BLOOD PRESSURE: 92 MMHG | RESPIRATION RATE: 19 BRPM | DIASTOLIC BLOOD PRESSURE: 56 MMHG | HEART RATE: 70 BPM | OXYGEN SATURATION: 98 %

## 2024-05-13 VITALS
SYSTOLIC BLOOD PRESSURE: 148 MMHG | OXYGEN SATURATION: 97 % | RESPIRATION RATE: 17 BRPM | DIASTOLIC BLOOD PRESSURE: 74 MMHG | HEART RATE: 94 BPM

## 2024-05-13 VITALS
RESPIRATION RATE: 17 BRPM | SYSTOLIC BLOOD PRESSURE: 151 MMHG | DIASTOLIC BLOOD PRESSURE: 63 MMHG | HEART RATE: 67 BPM | OXYGEN SATURATION: 97 %

## 2024-05-13 VITALS
HEART RATE: 70 BPM | RESPIRATION RATE: 15 BRPM | SYSTOLIC BLOOD PRESSURE: 131 MMHG | OXYGEN SATURATION: 98 % | DIASTOLIC BLOOD PRESSURE: 57 MMHG

## 2024-05-13 VITALS — HEART RATE: 83 BPM | OXYGEN SATURATION: 93 % | RESPIRATION RATE: 18 BRPM

## 2024-05-13 VITALS
SYSTOLIC BLOOD PRESSURE: 141 MMHG | RESPIRATION RATE: 18 BRPM | DIASTOLIC BLOOD PRESSURE: 46 MMHG | OXYGEN SATURATION: 98 % | HEART RATE: 77 BPM

## 2024-05-13 VITALS
SYSTOLIC BLOOD PRESSURE: 120 MMHG | RESPIRATION RATE: 17 BRPM | OXYGEN SATURATION: 96 % | DIASTOLIC BLOOD PRESSURE: 49 MMHG | HEART RATE: 96 BPM

## 2024-05-13 VITALS
SYSTOLIC BLOOD PRESSURE: 114 MMHG | HEART RATE: 78 BPM | RESPIRATION RATE: 16 BRPM | DIASTOLIC BLOOD PRESSURE: 35 MMHG | OXYGEN SATURATION: 96 %

## 2024-05-13 VITALS
DIASTOLIC BLOOD PRESSURE: 128 MMHG | RESPIRATION RATE: 16 BRPM | HEART RATE: 62 BPM | SYSTOLIC BLOOD PRESSURE: 165 MMHG | OXYGEN SATURATION: 97 %

## 2024-05-13 VITALS
HEART RATE: 69 BPM | SYSTOLIC BLOOD PRESSURE: 151 MMHG | DIASTOLIC BLOOD PRESSURE: 110 MMHG | OXYGEN SATURATION: 97 % | RESPIRATION RATE: 15 BRPM

## 2024-05-13 VITALS
SYSTOLIC BLOOD PRESSURE: 145 MMHG | HEART RATE: 87 BPM | RESPIRATION RATE: 16 BRPM | OXYGEN SATURATION: 97 % | DIASTOLIC BLOOD PRESSURE: 56 MMHG

## 2024-05-13 VITALS
DIASTOLIC BLOOD PRESSURE: 49 MMHG | TEMPERATURE: 98.1 F | SYSTOLIC BLOOD PRESSURE: 133 MMHG | OXYGEN SATURATION: 98 % | RESPIRATION RATE: 20 BRPM | HEART RATE: 70 BPM

## 2024-05-13 VITALS
SYSTOLIC BLOOD PRESSURE: 131 MMHG | DIASTOLIC BLOOD PRESSURE: 58 MMHG | OXYGEN SATURATION: 98 % | HEART RATE: 67 BPM | RESPIRATION RATE: 18 BRPM

## 2024-05-13 VITALS
SYSTOLIC BLOOD PRESSURE: 135 MMHG | HEART RATE: 103 BPM | RESPIRATION RATE: 23 BRPM | DIASTOLIC BLOOD PRESSURE: 70 MMHG | OXYGEN SATURATION: 93 %

## 2024-05-13 VITALS
DIASTOLIC BLOOD PRESSURE: 47 MMHG | OXYGEN SATURATION: 97 % | SYSTOLIC BLOOD PRESSURE: 97 MMHG | HEART RATE: 70 BPM | RESPIRATION RATE: 19 BRPM

## 2024-05-13 VITALS
HEART RATE: 74 BPM | DIASTOLIC BLOOD PRESSURE: 54 MMHG | RESPIRATION RATE: 15 BRPM | SYSTOLIC BLOOD PRESSURE: 143 MMHG | OXYGEN SATURATION: 95 %

## 2024-05-13 VITALS
OXYGEN SATURATION: 97 % | RESPIRATION RATE: 17 BRPM | DIASTOLIC BLOOD PRESSURE: 50 MMHG | SYSTOLIC BLOOD PRESSURE: 130 MMHG | HEART RATE: 72 BPM

## 2024-05-13 VITALS
OXYGEN SATURATION: 98 % | DIASTOLIC BLOOD PRESSURE: 44 MMHG | HEART RATE: 77 BPM | RESPIRATION RATE: 15 BRPM | SYSTOLIC BLOOD PRESSURE: 110 MMHG

## 2024-05-13 VITALS
HEART RATE: 58 BPM | SYSTOLIC BLOOD PRESSURE: 102 MMHG | RESPIRATION RATE: 15 BRPM | OXYGEN SATURATION: 97 % | DIASTOLIC BLOOD PRESSURE: 60 MMHG

## 2024-05-13 VITALS
SYSTOLIC BLOOD PRESSURE: 128 MMHG | DIASTOLIC BLOOD PRESSURE: 55 MMHG | RESPIRATION RATE: 16 BRPM | OXYGEN SATURATION: 99 % | HEART RATE: 67 BPM

## 2024-05-13 VITALS
SYSTOLIC BLOOD PRESSURE: 129 MMHG | DIASTOLIC BLOOD PRESSURE: 56 MMHG | RESPIRATION RATE: 19 BRPM | OXYGEN SATURATION: 98 % | HEART RATE: 75 BPM

## 2024-05-13 VITALS — HEART RATE: 72 BPM | RESPIRATION RATE: 17 BRPM | OXYGEN SATURATION: 97 %

## 2024-05-13 VITALS
OXYGEN SATURATION: 97 % | HEART RATE: 61 BPM | RESPIRATION RATE: 10 BRPM | DIASTOLIC BLOOD PRESSURE: 77 MMHG | SYSTOLIC BLOOD PRESSURE: 150 MMHG

## 2024-05-13 VITALS
HEART RATE: 85 BPM | OXYGEN SATURATION: 98 % | RESPIRATION RATE: 17 BRPM | DIASTOLIC BLOOD PRESSURE: 54 MMHG | SYSTOLIC BLOOD PRESSURE: 108 MMHG

## 2024-05-13 VITALS
SYSTOLIC BLOOD PRESSURE: 154 MMHG | HEART RATE: 72 BPM | DIASTOLIC BLOOD PRESSURE: 65 MMHG | TEMPERATURE: 98.1 F | RESPIRATION RATE: 14 BRPM | OXYGEN SATURATION: 98 %

## 2024-05-13 VITALS
OXYGEN SATURATION: 98 % | RESPIRATION RATE: 14 BRPM | DIASTOLIC BLOOD PRESSURE: 48 MMHG | SYSTOLIC BLOOD PRESSURE: 151 MMHG | HEART RATE: 71 BPM

## 2024-05-13 VITALS
DIASTOLIC BLOOD PRESSURE: 47 MMHG | HEART RATE: 98 BPM | SYSTOLIC BLOOD PRESSURE: 114 MMHG | RESPIRATION RATE: 12 BRPM | OXYGEN SATURATION: 98 %

## 2024-05-13 VITALS
DIASTOLIC BLOOD PRESSURE: 43 MMHG | HEART RATE: 77 BPM | SYSTOLIC BLOOD PRESSURE: 95 MMHG | RESPIRATION RATE: 23 BRPM | OXYGEN SATURATION: 97 % | TEMPERATURE: 98.3 F

## 2024-05-13 VITALS
DIASTOLIC BLOOD PRESSURE: 50 MMHG | OXYGEN SATURATION: 89 % | RESPIRATION RATE: 17 BRPM | HEART RATE: 84 BPM | SYSTOLIC BLOOD PRESSURE: 134 MMHG

## 2024-05-13 VITALS
OXYGEN SATURATION: 97 % | RESPIRATION RATE: 16 BRPM | HEART RATE: 69 BPM | DIASTOLIC BLOOD PRESSURE: 52 MMHG | SYSTOLIC BLOOD PRESSURE: 141 MMHG

## 2024-05-13 VITALS
DIASTOLIC BLOOD PRESSURE: 52 MMHG | SYSTOLIC BLOOD PRESSURE: 126 MMHG | OXYGEN SATURATION: 96 % | HEART RATE: 59 BPM | RESPIRATION RATE: 13 BRPM

## 2024-05-13 VITALS
RESPIRATION RATE: 10 BRPM | SYSTOLIC BLOOD PRESSURE: 140 MMHG | HEART RATE: 70 BPM | DIASTOLIC BLOOD PRESSURE: 48 MMHG | OXYGEN SATURATION: 97 %

## 2024-05-13 VITALS
HEART RATE: 71 BPM | OXYGEN SATURATION: 98 % | RESPIRATION RATE: 15 BRPM | SYSTOLIC BLOOD PRESSURE: 118 MMHG | DIASTOLIC BLOOD PRESSURE: 37 MMHG

## 2024-05-13 VITALS
RESPIRATION RATE: 17 BRPM | DIASTOLIC BLOOD PRESSURE: 58 MMHG | HEART RATE: 94 BPM | OXYGEN SATURATION: 97 % | SYSTOLIC BLOOD PRESSURE: 130 MMHG

## 2024-05-13 VITALS
HEART RATE: 82 BPM | SYSTOLIC BLOOD PRESSURE: 111 MMHG | OXYGEN SATURATION: 96 % | RESPIRATION RATE: 22 BRPM | DIASTOLIC BLOOD PRESSURE: 50 MMHG

## 2024-05-13 VITALS
OXYGEN SATURATION: 97 % | HEART RATE: 68 BPM | DIASTOLIC BLOOD PRESSURE: 48 MMHG | RESPIRATION RATE: 17 BRPM | SYSTOLIC BLOOD PRESSURE: 133 MMHG

## 2024-05-13 VITALS
RESPIRATION RATE: 15 BRPM | DIASTOLIC BLOOD PRESSURE: 53 MMHG | SYSTOLIC BLOOD PRESSURE: 156 MMHG | HEART RATE: 68 BPM | OXYGEN SATURATION: 99 %

## 2024-05-13 VITALS
DIASTOLIC BLOOD PRESSURE: 49 MMHG | OXYGEN SATURATION: 98 % | RESPIRATION RATE: 17 BRPM | HEART RATE: 75 BPM | SYSTOLIC BLOOD PRESSURE: 109 MMHG

## 2024-05-13 VITALS
RESPIRATION RATE: 18 BRPM | OXYGEN SATURATION: 97 % | DIASTOLIC BLOOD PRESSURE: 72 MMHG | SYSTOLIC BLOOD PRESSURE: 138 MMHG | HEART RATE: 94 BPM

## 2024-05-13 VITALS
OXYGEN SATURATION: 98 % | DIASTOLIC BLOOD PRESSURE: 48 MMHG | SYSTOLIC BLOOD PRESSURE: 151 MMHG | HEART RATE: 70 BPM | RESPIRATION RATE: 15 BRPM

## 2024-05-13 VITALS
SYSTOLIC BLOOD PRESSURE: 172 MMHG | RESPIRATION RATE: 12 BRPM | HEART RATE: 58 BPM | TEMPERATURE: 98 F | DIASTOLIC BLOOD PRESSURE: 122 MMHG | OXYGEN SATURATION: 97 %

## 2024-05-13 VITALS
OXYGEN SATURATION: 97 % | HEART RATE: 88 BPM | RESPIRATION RATE: 20 BRPM | DIASTOLIC BLOOD PRESSURE: 45 MMHG | SYSTOLIC BLOOD PRESSURE: 112 MMHG

## 2024-05-13 VITALS
HEART RATE: 75 BPM | SYSTOLIC BLOOD PRESSURE: 122 MMHG | RESPIRATION RATE: 20 BRPM | DIASTOLIC BLOOD PRESSURE: 41 MMHG | OXYGEN SATURATION: 98 %

## 2024-05-13 VITALS
SYSTOLIC BLOOD PRESSURE: 139 MMHG | RESPIRATION RATE: 18 BRPM | OXYGEN SATURATION: 97 % | DIASTOLIC BLOOD PRESSURE: 78 MMHG | HEART RATE: 96 BPM

## 2024-05-13 VITALS — RESPIRATION RATE: 19 BRPM | OXYGEN SATURATION: 98 % | HEART RATE: 65 BPM

## 2024-05-13 VITALS
DIASTOLIC BLOOD PRESSURE: 52 MMHG | SYSTOLIC BLOOD PRESSURE: 103 MMHG | HEART RATE: 73 BPM | RESPIRATION RATE: 17 BRPM | OXYGEN SATURATION: 98 %

## 2024-05-13 VITALS
DIASTOLIC BLOOD PRESSURE: 50 MMHG | SYSTOLIC BLOOD PRESSURE: 148 MMHG | HEART RATE: 70 BPM | OXYGEN SATURATION: 98 % | RESPIRATION RATE: 28 BRPM

## 2024-05-13 VITALS
SYSTOLIC BLOOD PRESSURE: 121 MMHG | OXYGEN SATURATION: 96 % | DIASTOLIC BLOOD PRESSURE: 57 MMHG | HEART RATE: 89 BPM | RESPIRATION RATE: 19 BRPM

## 2024-05-13 VITALS
OXYGEN SATURATION: 98 % | SYSTOLIC BLOOD PRESSURE: 160 MMHG | HEART RATE: 65 BPM | RESPIRATION RATE: 14 BRPM | DIASTOLIC BLOOD PRESSURE: 62 MMHG

## 2024-05-13 VITALS
HEART RATE: 64 BPM | RESPIRATION RATE: 16 BRPM | OXYGEN SATURATION: 98 % | DIASTOLIC BLOOD PRESSURE: 56 MMHG | SYSTOLIC BLOOD PRESSURE: 144 MMHG

## 2024-05-13 VITALS
RESPIRATION RATE: 19 BRPM | HEART RATE: 68 BPM | SYSTOLIC BLOOD PRESSURE: 147 MMHG | DIASTOLIC BLOOD PRESSURE: 40 MMHG | OXYGEN SATURATION: 97 %

## 2024-05-13 VITALS
DIASTOLIC BLOOD PRESSURE: 50 MMHG | HEART RATE: 72 BPM | RESPIRATION RATE: 17 BRPM | SYSTOLIC BLOOD PRESSURE: 143 MMHG | OXYGEN SATURATION: 97 %

## 2024-05-13 VITALS
OXYGEN SATURATION: 99 % | SYSTOLIC BLOOD PRESSURE: 113 MMHG | DIASTOLIC BLOOD PRESSURE: 65 MMHG | HEART RATE: 64 BPM | RESPIRATION RATE: 13 BRPM

## 2024-05-13 VITALS
OXYGEN SATURATION: 98 % | RESPIRATION RATE: 16 BRPM | HEART RATE: 90 BPM | DIASTOLIC BLOOD PRESSURE: 69 MMHG | SYSTOLIC BLOOD PRESSURE: 131 MMHG

## 2024-05-13 VITALS
OXYGEN SATURATION: 96 % | HEART RATE: 95 BPM | SYSTOLIC BLOOD PRESSURE: 139 MMHG | DIASTOLIC BLOOD PRESSURE: 79 MMHG | RESPIRATION RATE: 17 BRPM

## 2024-05-13 VITALS
RESPIRATION RATE: 28 BRPM | HEART RATE: 78 BPM | SYSTOLIC BLOOD PRESSURE: 80 MMHG | DIASTOLIC BLOOD PRESSURE: 33 MMHG | OXYGEN SATURATION: 97 %

## 2024-05-13 VITALS
RESPIRATION RATE: 18 BRPM | HEART RATE: 64 BPM | OXYGEN SATURATION: 96 % | DIASTOLIC BLOOD PRESSURE: 71 MMHG | SYSTOLIC BLOOD PRESSURE: 161 MMHG

## 2024-05-13 VITALS
HEART RATE: 78 BPM | SYSTOLIC BLOOD PRESSURE: 109 MMHG | DIASTOLIC BLOOD PRESSURE: 46 MMHG | RESPIRATION RATE: 16 BRPM | OXYGEN SATURATION: 97 %

## 2024-05-13 VITALS
DIASTOLIC BLOOD PRESSURE: 53 MMHG | RESPIRATION RATE: 12 BRPM | HEART RATE: 70 BPM | SYSTOLIC BLOOD PRESSURE: 150 MMHG | OXYGEN SATURATION: 98 %

## 2024-05-13 VITALS
DIASTOLIC BLOOD PRESSURE: 54 MMHG | RESPIRATION RATE: 12 BRPM | OXYGEN SATURATION: 98 % | SYSTOLIC BLOOD PRESSURE: 126 MMHG | HEART RATE: 72 BPM

## 2024-05-13 VITALS
DIASTOLIC BLOOD PRESSURE: 48 MMHG | SYSTOLIC BLOOD PRESSURE: 114 MMHG | RESPIRATION RATE: 19 BRPM | HEART RATE: 90 BPM | OXYGEN SATURATION: 97 %

## 2024-05-13 VITALS
DIASTOLIC BLOOD PRESSURE: 40 MMHG | HEART RATE: 63 BPM | SYSTOLIC BLOOD PRESSURE: 103 MMHG | RESPIRATION RATE: 19 BRPM | OXYGEN SATURATION: 98 %

## 2024-05-13 VITALS
RESPIRATION RATE: 18 BRPM | HEART RATE: 73 BPM | OXYGEN SATURATION: 98 % | SYSTOLIC BLOOD PRESSURE: 123 MMHG | DIASTOLIC BLOOD PRESSURE: 49 MMHG

## 2024-05-13 VITALS
RESPIRATION RATE: 13 BRPM | SYSTOLIC BLOOD PRESSURE: 136 MMHG | HEART RATE: 71 BPM | DIASTOLIC BLOOD PRESSURE: 54 MMHG | OXYGEN SATURATION: 98 %

## 2024-05-13 VITALS
RESPIRATION RATE: 16 BRPM | HEART RATE: 78 BPM | OXYGEN SATURATION: 98 % | SYSTOLIC BLOOD PRESSURE: 134 MMHG | DIASTOLIC BLOOD PRESSURE: 55 MMHG

## 2024-05-13 VITALS
SYSTOLIC BLOOD PRESSURE: 110 MMHG | DIASTOLIC BLOOD PRESSURE: 48 MMHG | HEART RATE: 75 BPM | RESPIRATION RATE: 22 BRPM | OXYGEN SATURATION: 95 %

## 2024-05-13 VITALS
SYSTOLIC BLOOD PRESSURE: 98 MMHG | OXYGEN SATURATION: 99 % | HEART RATE: 72 BPM | DIASTOLIC BLOOD PRESSURE: 35 MMHG | RESPIRATION RATE: 22 BRPM

## 2024-05-13 LAB
ANION GAP SERPL CALCULATED.3IONS-SCNC: 2 MMOL/L (ref 5–15)
APTT PPP: 30 SEC (ref 24.5–34.5)
BASE EXCESS BLDV CALC-SCNC: 3.7 MMOL/L (ref -2–2)
BUN SERPL-MCNC: 18 MG/DL (ref 9–23)
BUN/CREAT SERPL: 17.8 (ref 10–20)
CALCIUM SERPL-MCNC: 8.8 MG/DL (ref 8.7–10.4)
CHLORIDE SERPL-SCNC: 111 MMOL/L (ref 98–107)
CO2 SERPL-SCNC: 29 MMOL/L (ref 20–30)
GLUCOSE SERPL-MCNC: 112 MG/DL (ref 74–106)
HCT VFR BLD AUTO: 31.1 % (ref 41–53)
HGB BLD-MCNC: 9.9 G/DL (ref 13.5–17.5)
INR PPP: 1.05 (ref 0.9–1.15)
MCH RBC QN AUTO: 28.8 PG (ref 28–32)
MCV RBC AUTO: 90.3 FL (ref 80–100)
NRBC BLD QL AUTO: 0 %
POTASSIUM SERPL-SCNC: 4.7 MMOL/L (ref 3.5–5.1)
PROTHROMBIN TIME: 11.1 SEC (ref 9.3–11.8)
SODIUM SERPL-SCNC: 142 MMOL/L (ref 136–145)

## 2024-05-13 RX ADMIN — ENOXAPARIN SODIUM SCH MG: 40 INJECTION SUBCUTANEOUS at 09:05

## 2024-05-14 VITALS
RESPIRATION RATE: 17 BRPM | DIASTOLIC BLOOD PRESSURE: 47 MMHG | HEART RATE: 68 BPM | OXYGEN SATURATION: 94 % | SYSTOLIC BLOOD PRESSURE: 152 MMHG

## 2024-05-14 VITALS
RESPIRATION RATE: 15 BRPM | DIASTOLIC BLOOD PRESSURE: 97 MMHG | HEART RATE: 72 BPM | SYSTOLIC BLOOD PRESSURE: 123 MMHG | OXYGEN SATURATION: 99 %

## 2024-05-14 VITALS
OXYGEN SATURATION: 99 % | HEART RATE: 63 BPM | DIASTOLIC BLOOD PRESSURE: 53 MMHG | RESPIRATION RATE: 17 BRPM | SYSTOLIC BLOOD PRESSURE: 131 MMHG

## 2024-05-14 VITALS
OXYGEN SATURATION: 98 % | SYSTOLIC BLOOD PRESSURE: 142 MMHG | HEART RATE: 70 BPM | RESPIRATION RATE: 18 BRPM | DIASTOLIC BLOOD PRESSURE: 54 MMHG

## 2024-05-14 VITALS — OXYGEN SATURATION: 98 % | HEART RATE: 68 BPM | RESPIRATION RATE: 15 BRPM

## 2024-05-14 VITALS
OXYGEN SATURATION: 100 % | TEMPERATURE: 97.8 F | DIASTOLIC BLOOD PRESSURE: 33 MMHG | SYSTOLIC BLOOD PRESSURE: 115 MMHG | HEART RATE: 73 BPM | RESPIRATION RATE: 17 BRPM

## 2024-05-14 VITALS
HEART RATE: 60 BPM | RESPIRATION RATE: 18 BRPM | DIASTOLIC BLOOD PRESSURE: 60 MMHG | OXYGEN SATURATION: 99 % | SYSTOLIC BLOOD PRESSURE: 140 MMHG

## 2024-05-14 VITALS
OXYGEN SATURATION: 99 % | HEART RATE: 73 BPM | SYSTOLIC BLOOD PRESSURE: 123 MMHG | DIASTOLIC BLOOD PRESSURE: 97 MMHG | RESPIRATION RATE: 14 BRPM

## 2024-05-14 VITALS
DIASTOLIC BLOOD PRESSURE: 51 MMHG | RESPIRATION RATE: 18 BRPM | HEART RATE: 70 BPM | OXYGEN SATURATION: 98 % | SYSTOLIC BLOOD PRESSURE: 133 MMHG | TEMPERATURE: 98.9 F

## 2024-05-14 VITALS
RESPIRATION RATE: 20 BRPM | OXYGEN SATURATION: 100 % | SYSTOLIC BLOOD PRESSURE: 148 MMHG | DIASTOLIC BLOOD PRESSURE: 53 MMHG | HEART RATE: 69 BPM

## 2024-05-14 VITALS
DIASTOLIC BLOOD PRESSURE: 36 MMHG | OXYGEN SATURATION: 100 % | HEART RATE: 64 BPM | RESPIRATION RATE: 14 BRPM | SYSTOLIC BLOOD PRESSURE: 105 MMHG

## 2024-05-14 VITALS
TEMPERATURE: 97.6 F | DIASTOLIC BLOOD PRESSURE: 37 MMHG | RESPIRATION RATE: 17 BRPM | SYSTOLIC BLOOD PRESSURE: 133 MMHG | OXYGEN SATURATION: 96 % | HEART RATE: 67 BPM

## 2024-05-14 VITALS
OXYGEN SATURATION: 99 % | DIASTOLIC BLOOD PRESSURE: 52 MMHG | RESPIRATION RATE: 16 BRPM | HEART RATE: 63 BPM | SYSTOLIC BLOOD PRESSURE: 125 MMHG

## 2024-05-14 VITALS
HEART RATE: 66 BPM | OXYGEN SATURATION: 99 % | DIASTOLIC BLOOD PRESSURE: 51 MMHG | RESPIRATION RATE: 19 BRPM | SYSTOLIC BLOOD PRESSURE: 138 MMHG

## 2024-05-14 VITALS
HEART RATE: 65 BPM | RESPIRATION RATE: 17 BRPM | SYSTOLIC BLOOD PRESSURE: 151 MMHG | DIASTOLIC BLOOD PRESSURE: 59 MMHG | OXYGEN SATURATION: 98 %

## 2024-05-14 VITALS
RESPIRATION RATE: 17 BRPM | TEMPERATURE: 98.7 F | SYSTOLIC BLOOD PRESSURE: 134 MMHG | OXYGEN SATURATION: 97 % | DIASTOLIC BLOOD PRESSURE: 65 MMHG | HEART RATE: 67 BPM

## 2024-05-14 VITALS
DIASTOLIC BLOOD PRESSURE: 41 MMHG | SYSTOLIC BLOOD PRESSURE: 140 MMHG | OXYGEN SATURATION: 89 % | HEART RATE: 68 BPM | RESPIRATION RATE: 28 BRPM

## 2024-05-14 VITALS
SYSTOLIC BLOOD PRESSURE: 105 MMHG | OXYGEN SATURATION: 91 % | DIASTOLIC BLOOD PRESSURE: 34 MMHG | HEART RATE: 69 BPM | RESPIRATION RATE: 15 BRPM

## 2024-05-14 VITALS
HEART RATE: 70 BPM | DIASTOLIC BLOOD PRESSURE: 51 MMHG | RESPIRATION RATE: 17 BRPM | OXYGEN SATURATION: 97 % | SYSTOLIC BLOOD PRESSURE: 119 MMHG

## 2024-05-14 VITALS
RESPIRATION RATE: 13 BRPM | HEART RATE: 60 BPM | OXYGEN SATURATION: 96 % | SYSTOLIC BLOOD PRESSURE: 125 MMHG | DIASTOLIC BLOOD PRESSURE: 51 MMHG

## 2024-05-14 VITALS
HEART RATE: 63 BPM | SYSTOLIC BLOOD PRESSURE: 148 MMHG | OXYGEN SATURATION: 96 % | DIASTOLIC BLOOD PRESSURE: 40 MMHG | RESPIRATION RATE: 20 BRPM

## 2024-05-14 VITALS
DIASTOLIC BLOOD PRESSURE: 49 MMHG | SYSTOLIC BLOOD PRESSURE: 152 MMHG | RESPIRATION RATE: 19 BRPM | OXYGEN SATURATION: 96 % | HEART RATE: 67 BPM

## 2024-05-14 VITALS
DIASTOLIC BLOOD PRESSURE: 49 MMHG | RESPIRATION RATE: 16 BRPM | OXYGEN SATURATION: 99 % | SYSTOLIC BLOOD PRESSURE: 139 MMHG | HEART RATE: 65 BPM

## 2024-05-14 VITALS — OXYGEN SATURATION: 99 % | RESPIRATION RATE: 15 BRPM | HEART RATE: 67 BPM

## 2024-05-14 VITALS — RESPIRATION RATE: 19 BRPM | DIASTOLIC BLOOD PRESSURE: 49 MMHG | SYSTOLIC BLOOD PRESSURE: 154 MMHG | HEART RATE: 52 BPM

## 2024-05-14 VITALS
DIASTOLIC BLOOD PRESSURE: 61 MMHG | HEART RATE: 69 BPM | SYSTOLIC BLOOD PRESSURE: 148 MMHG | RESPIRATION RATE: 17 BRPM | OXYGEN SATURATION: 100 %

## 2024-05-14 VITALS — HEART RATE: 67 BPM | OXYGEN SATURATION: 94 % | RESPIRATION RATE: 22 BRPM

## 2024-05-14 VITALS
SYSTOLIC BLOOD PRESSURE: 124 MMHG | HEART RATE: 64 BPM | RESPIRATION RATE: 15 BRPM | OXYGEN SATURATION: 99 % | DIASTOLIC BLOOD PRESSURE: 77 MMHG

## 2024-05-14 VITALS
DIASTOLIC BLOOD PRESSURE: 30 MMHG | SYSTOLIC BLOOD PRESSURE: 111 MMHG | RESPIRATION RATE: 20 BRPM | TEMPERATURE: 98.3 F | OXYGEN SATURATION: 98 % | HEART RATE: 54 BPM

## 2024-05-14 VITALS
HEART RATE: 65 BPM | OXYGEN SATURATION: 100 % | DIASTOLIC BLOOD PRESSURE: 52 MMHG | RESPIRATION RATE: 16 BRPM | SYSTOLIC BLOOD PRESSURE: 136 MMHG

## 2024-05-14 VITALS
DIASTOLIC BLOOD PRESSURE: 50 MMHG | SYSTOLIC BLOOD PRESSURE: 139 MMHG | RESPIRATION RATE: 15 BRPM | OXYGEN SATURATION: 97 % | TEMPERATURE: 98.4 F | HEART RATE: 98 BPM

## 2024-05-14 VITALS
RESPIRATION RATE: 23 BRPM | DIASTOLIC BLOOD PRESSURE: 50 MMHG | SYSTOLIC BLOOD PRESSURE: 146 MMHG | OXYGEN SATURATION: 98 % | HEART RATE: 66 BPM

## 2024-05-14 VITALS
DIASTOLIC BLOOD PRESSURE: 43 MMHG | SYSTOLIC BLOOD PRESSURE: 133 MMHG | HEART RATE: 61 BPM | RESPIRATION RATE: 19 BRPM | OXYGEN SATURATION: 93 %

## 2024-05-14 VITALS — OXYGEN SATURATION: 96 % | HEART RATE: 70 BPM | RESPIRATION RATE: 19 BRPM

## 2024-05-14 VITALS
SYSTOLIC BLOOD PRESSURE: 127 MMHG | HEART RATE: 68 BPM | DIASTOLIC BLOOD PRESSURE: 47 MMHG | RESPIRATION RATE: 13 BRPM | OXYGEN SATURATION: 96 %

## 2024-05-14 VITALS
RESPIRATION RATE: 17 BRPM | DIASTOLIC BLOOD PRESSURE: 45 MMHG | OXYGEN SATURATION: 100 % | HEART RATE: 65 BPM | SYSTOLIC BLOOD PRESSURE: 112 MMHG

## 2024-05-14 LAB
ANION GAP SERPL CALCULATED.3IONS-SCNC: 0 MMOL/L (ref 5–15)
BUN SERPL-MCNC: 11 MG/DL (ref 9–23)
BUN/CREAT SERPL: 11.6 (ref 10–20)
CALCIUM SERPL-MCNC: 8.5 MG/DL (ref 8.7–10.4)
CHLORIDE SERPL-SCNC: 113 MMOL/L (ref 98–107)
CO2 SERPL-SCNC: 29 MMOL/L (ref 20–30)
GLUCOSE SERPL-MCNC: 134 MG/DL (ref 74–106)
POTASSIUM SERPL-SCNC: 4.5 MMOL/L (ref 3.5–5.1)
SODIUM SERPL-SCNC: 142 MMOL/L (ref 136–145)

## 2024-05-15 VITALS
OXYGEN SATURATION: 97 % | SYSTOLIC BLOOD PRESSURE: 159 MMHG | RESPIRATION RATE: 18 BRPM | HEART RATE: 72 BPM | DIASTOLIC BLOOD PRESSURE: 78 MMHG

## 2024-05-15 VITALS
OXYGEN SATURATION: 98 % | RESPIRATION RATE: 21 BRPM | DIASTOLIC BLOOD PRESSURE: 47 MMHG | HEART RATE: 72 BPM | SYSTOLIC BLOOD PRESSURE: 128 MMHG | TEMPERATURE: 98.9 F

## 2024-05-15 VITALS
RESPIRATION RATE: 22 BRPM | OXYGEN SATURATION: 96 % | DIASTOLIC BLOOD PRESSURE: 59 MMHG | TEMPERATURE: 98.1 F | HEART RATE: 69 BPM | SYSTOLIC BLOOD PRESSURE: 131 MMHG

## 2024-05-15 VITALS
TEMPERATURE: 98.3 F | SYSTOLIC BLOOD PRESSURE: 127 MMHG | OXYGEN SATURATION: 96 % | HEART RATE: 64 BPM | RESPIRATION RATE: 20 BRPM | DIASTOLIC BLOOD PRESSURE: 48 MMHG

## 2024-05-15 VITALS
TEMPERATURE: 98.1 F | SYSTOLIC BLOOD PRESSURE: 154 MMHG | OXYGEN SATURATION: 100 % | HEART RATE: 65 BPM | DIASTOLIC BLOOD PRESSURE: 73 MMHG | RESPIRATION RATE: 22 BRPM

## 2024-05-15 VITALS
OXYGEN SATURATION: 96 % | RESPIRATION RATE: 19 BRPM | SYSTOLIC BLOOD PRESSURE: 137 MMHG | HEART RATE: 65 BPM | TEMPERATURE: 98.3 F | DIASTOLIC BLOOD PRESSURE: 60 MMHG

## 2024-05-15 VITALS
DIASTOLIC BLOOD PRESSURE: 68 MMHG | HEART RATE: 68 BPM | OXYGEN SATURATION: 97 % | TEMPERATURE: 98.9 F | RESPIRATION RATE: 19 BRPM | SYSTOLIC BLOOD PRESSURE: 158 MMHG

## 2024-05-15 VITALS — HEART RATE: 68 BPM

## 2024-05-15 LAB
ANION GAP SERPL CALCULATED.3IONS-SCNC: 2 MMOL/L (ref 5–15)
BUN SERPL-MCNC: 11 MG/DL (ref 9–23)
BUN/CREAT SERPL: 10.2 (ref 10–20)
CALCIUM SERPL-MCNC: 9.1 MG/DL (ref 8.5–10.1)
CHLORIDE SERPL-SCNC: 112 MMOL/L (ref 98–107)
CO2 SERPL-SCNC: 27 MMOL/L (ref 20–30)
GLUCOSE SERPL-MCNC: 93 MG/DL (ref 74–106)
POTASSIUM SERPL-SCNC: 4.8 MMOL/L (ref 3.5–5.1)
SODIUM SERPL-SCNC: 141 MMOL/L (ref 136–145)

## 2024-05-15 RX ADMIN — FUROSEMIDE ONE MG: 10 INJECTION, SOLUTION INTRAMUSCULAR; INTRAVENOUS at 12:34

## 2024-05-16 VITALS — SYSTOLIC BLOOD PRESSURE: 131 MMHG | TEMPERATURE: 98.42 F | DIASTOLIC BLOOD PRESSURE: 59 MMHG

## 2024-05-16 VITALS
DIASTOLIC BLOOD PRESSURE: 74 MMHG | OXYGEN SATURATION: 96 % | SYSTOLIC BLOOD PRESSURE: 137 MMHG | TEMPERATURE: 98.7 F | HEART RATE: 68 BPM | RESPIRATION RATE: 18 BRPM

## 2024-05-16 VITALS — HEART RATE: 60 BPM | RESPIRATION RATE: 22 BRPM | OXYGEN SATURATION: 94 %

## 2024-05-16 VITALS
SYSTOLIC BLOOD PRESSURE: 158 MMHG | OXYGEN SATURATION: 95 % | DIASTOLIC BLOOD PRESSURE: 76 MMHG | TEMPERATURE: 98.4 F | HEART RATE: 68 BPM | RESPIRATION RATE: 17 BRPM

## 2024-05-16 VITALS
HEART RATE: 67 BPM | TEMPERATURE: 98.4 F | OXYGEN SATURATION: 93 % | RESPIRATION RATE: 19 BRPM | DIASTOLIC BLOOD PRESSURE: 59 MMHG | SYSTOLIC BLOOD PRESSURE: 152 MMHG

## 2024-05-16 VITALS
RESPIRATION RATE: 21 BRPM | DIASTOLIC BLOOD PRESSURE: 92 MMHG | HEART RATE: 69 BPM | SYSTOLIC BLOOD PRESSURE: 131 MMHG | TEMPERATURE: 98.8 F | OXYGEN SATURATION: 97 %

## 2024-05-16 VITALS
SYSTOLIC BLOOD PRESSURE: 147 MMHG | HEART RATE: 61 BPM | RESPIRATION RATE: 17 BRPM | OXYGEN SATURATION: 94 % | TEMPERATURE: 97.7 F | DIASTOLIC BLOOD PRESSURE: 58 MMHG

## 2024-05-16 VITALS — HEART RATE: 64 BPM

## 2024-05-16 RX ADMIN — MORPHINE SULFATE PRN MG: 4 INJECTION, SOLUTION INTRAMUSCULAR; INTRAVENOUS at 00:42

## 2024-09-16 ENCOUNTER — HOSPITAL ENCOUNTER (EMERGENCY)
Dept: HOSPITAL 15 - ER | Age: 69
LOS: 1 days | Discharge: HOME | End: 2024-09-17
Payer: MEDICARE

## 2024-09-16 VITALS — WEIGHT: 300.49 LBS | BODY MASS INDEX: 40.7 KG/M2 | HEIGHT: 72 IN

## 2024-09-16 VITALS — TEMPERATURE: 98.9 F

## 2024-09-16 DIAGNOSIS — Z79.899: ICD-10-CM

## 2024-09-16 DIAGNOSIS — Z90.49: ICD-10-CM

## 2024-09-16 DIAGNOSIS — E11.22: ICD-10-CM

## 2024-09-16 DIAGNOSIS — F32.A: ICD-10-CM

## 2024-09-16 DIAGNOSIS — Z79.84: ICD-10-CM

## 2024-09-16 DIAGNOSIS — F41.9: ICD-10-CM

## 2024-09-16 DIAGNOSIS — J44.9: ICD-10-CM

## 2024-09-16 DIAGNOSIS — N18.9: ICD-10-CM

## 2024-09-16 DIAGNOSIS — Z98.890: ICD-10-CM

## 2024-09-16 DIAGNOSIS — J45.909: ICD-10-CM

## 2024-09-16 DIAGNOSIS — R07.89: Primary | ICD-10-CM

## 2024-09-16 DIAGNOSIS — E78.00: ICD-10-CM

## 2024-09-16 DIAGNOSIS — I13.0: ICD-10-CM

## 2024-09-16 DIAGNOSIS — Z79.82: ICD-10-CM

## 2024-09-16 LAB
ALBUMIN SERPL-MCNC: 3.3 G/DL (ref 3.2–4.8)
ALP SERPL-CCNC: 59 U/L (ref 46–116)
ALT SERPL-CCNC: 23 U/L (ref 7–40)
ANION GAP SERPL CALCULATED.3IONS-SCNC: 5 MMOL/L (ref 5–15)
APTT PPP: 26.2 SEC (ref 24.5–34.5)
BILIRUB SERPL-MCNC: 0.3 MG/DL (ref 0.2–1)
BUN SERPL-MCNC: 16 MG/DL (ref 9–23)
BUN/CREAT SERPL: 12.9 (ref 10–20)
CALCIUM SERPL-MCNC: 9.3 MG/DL (ref 8.7–10.4)
CHLORIDE SERPL-SCNC: 116 MMOL/L (ref 98–107)
CO2 SERPL-SCNC: 22 MMOL/L (ref 20–30)
GLUCOSE SERPL-MCNC: 88 MG/DL (ref 74–106)
HCT VFR BLD AUTO: 32.4 % (ref 41–53)
HGB BLD-MCNC: 10.7 G/DL (ref 13.5–17.5)
INR PPP: 1.03 (ref 0.9–1.15)
MAGNESIUM SERPL-MCNC: 2.1 MG/DL (ref 1.6–2.6)
MCH RBC QN AUTO: 29.9 PG (ref 28–32)
MCV RBC AUTO: 90.9 FL (ref 80–100)
NRBC BLD QL AUTO: 0.1 %
POTASSIUM SERPL-SCNC: 4.4 MMOL/L (ref 3.5–5.1)
PROT SERPL-MCNC: 6.1 G/DL (ref 5.7–8.2)
PROTHROMBIN TIME: 10.9 SEC (ref 9.3–11.8)
SODIUM SERPL-SCNC: 143 MMOL/L (ref 136–145)

## 2024-09-16 PROCEDURE — 83735 ASSAY OF MAGNESIUM: CPT

## 2024-09-16 PROCEDURE — 36415 COLL VENOUS BLD VENIPUNCTURE: CPT

## 2024-09-16 PROCEDURE — 80053 COMPREHEN METABOLIC PANEL: CPT

## 2024-09-16 PROCEDURE — 84484 ASSAY OF TROPONIN QUANT: CPT

## 2024-09-16 PROCEDURE — 93005 ELECTROCARDIOGRAM TRACING: CPT

## 2024-09-16 PROCEDURE — 85025 COMPLETE CBC W/AUTO DIFF WBC: CPT

## 2024-09-16 PROCEDURE — 85610 PROTHROMBIN TIME: CPT

## 2024-09-16 PROCEDURE — 83880 ASSAY OF NATRIURETIC PEPTIDE: CPT

## 2024-09-16 PROCEDURE — 71045 X-RAY EXAM CHEST 1 VIEW: CPT

## 2024-09-16 PROCEDURE — 85730 THROMBOPLASTIN TIME PARTIAL: CPT

## 2024-09-17 VITALS
RESPIRATION RATE: 16 BRPM | SYSTOLIC BLOOD PRESSURE: 129 MMHG | HEART RATE: 60 BPM | DIASTOLIC BLOOD PRESSURE: 63 MMHG | OXYGEN SATURATION: 96 %

## 2024-09-17 VITALS — OXYGEN SATURATION: 96 % | RESPIRATION RATE: 14 BRPM | HEART RATE: 51 BPM

## 2024-09-17 VITALS — RESPIRATION RATE: 14 BRPM | HEART RATE: 51 BPM | OXYGEN SATURATION: 96 %

## 2024-09-17 RX ADMIN — FUROSEMIDE ONE MG: 10 INJECTION, SOLUTION INTRAMUSCULAR; INTRAVENOUS at 07:15

## 2024-09-17 RX ADMIN — HYDROCODONE BITARTRATE AND ACETAMINOPHEN ONE TAB: 10; 325 TABLET ORAL at 12:10

## 2024-09-17 RX ADMIN — HYDROCODONE BITARTRATE AND ACETAMINOPHEN ONE TAB: 10; 325 TABLET ORAL at 02:02

## 2025-01-05 ENCOUNTER — HOSPITAL ENCOUNTER (INPATIENT)
Dept: HOSPITAL 15 - ER | Age: 70
LOS: 4 days | Discharge: HOME | DRG: 291 | End: 2025-01-09
Attending: EMERGENCY MEDICINE | Admitting: INTERNAL MEDICINE
Payer: COMMERCIAL

## 2025-01-05 VITALS — OXYGEN SATURATION: 96 % | HEART RATE: 43 BPM | RESPIRATION RATE: 16 BRPM

## 2025-01-05 VITALS — RESPIRATION RATE: 11 BRPM | HEART RATE: 46 BPM | OXYGEN SATURATION: 94 %

## 2025-01-05 VITALS — WEIGHT: 299.83 LBS | HEIGHT: 67 IN | BODY MASS INDEX: 47.06 KG/M2

## 2025-01-05 DIAGNOSIS — F41.9: ICD-10-CM

## 2025-01-05 DIAGNOSIS — Z82.3: ICD-10-CM

## 2025-01-05 DIAGNOSIS — K21.9: ICD-10-CM

## 2025-01-05 DIAGNOSIS — N18.9: ICD-10-CM

## 2025-01-05 DIAGNOSIS — F17.210: ICD-10-CM

## 2025-01-05 DIAGNOSIS — F32.A: ICD-10-CM

## 2025-01-05 DIAGNOSIS — Z82.49: ICD-10-CM

## 2025-01-05 DIAGNOSIS — Z83.3: ICD-10-CM

## 2025-01-05 DIAGNOSIS — I50.43: ICD-10-CM

## 2025-01-05 DIAGNOSIS — E11.22: ICD-10-CM

## 2025-01-05 DIAGNOSIS — E78.5: ICD-10-CM

## 2025-01-05 DIAGNOSIS — I25.10: ICD-10-CM

## 2025-01-05 DIAGNOSIS — E66.01: ICD-10-CM

## 2025-01-05 DIAGNOSIS — Z90.49: ICD-10-CM

## 2025-01-05 DIAGNOSIS — Z81.8: ICD-10-CM

## 2025-01-05 DIAGNOSIS — I13.0: Primary | ICD-10-CM

## 2025-01-05 DIAGNOSIS — Z20.822: ICD-10-CM

## 2025-01-05 DIAGNOSIS — Z82.5: ICD-10-CM

## 2025-01-05 DIAGNOSIS — I95.9: ICD-10-CM

## 2025-01-05 DIAGNOSIS — J44.89: ICD-10-CM

## 2025-01-05 DIAGNOSIS — Z79.82: ICD-10-CM

## 2025-01-05 DIAGNOSIS — Z79.899: ICD-10-CM

## 2025-01-05 DIAGNOSIS — Z99.81: ICD-10-CM

## 2025-01-05 DIAGNOSIS — N17.9: ICD-10-CM

## 2025-01-05 DIAGNOSIS — I69.351: ICD-10-CM

## 2025-01-05 DIAGNOSIS — R00.1: ICD-10-CM

## 2025-01-05 LAB
ANION GAP SERPL CALCULATED.3IONS-SCNC: 5 MMOL/L (ref 5–15)
BUN SERPL-MCNC: 45 MG/DL (ref 9–23)
BUN/CREAT SERPL: 18.8 (ref 10–20)
CALCIUM SERPL-MCNC: 10 MG/DL (ref 8.7–10.4)
CHLORIDE SERPL-SCNC: 110 MMOL/L (ref 98–107)
CO2 SERPL-SCNC: 30 MMOL/L (ref 20–31)
GLUCOSE SERPL-MCNC: 101 MG/DL (ref 74–106)
HCT VFR BLD AUTO: 37.1 % (ref 41–53)
HGB BLD-MCNC: 11.7 G/DL (ref 13.5–17.5)
MCH RBC QN AUTO: 31 PG (ref 28–32)
MCV RBC AUTO: 97.9 FL (ref 80–100)
NRBC BLD QL AUTO: 0.2 %
POTASSIUM SERPL-SCNC: 3.7 MMOL/L (ref 3.5–5.1)
SODIUM SERPL-SCNC: 145 MMOL/L (ref 136–145)

## 2025-01-05 PROCEDURE — 36415 COLL VENOUS BLD VENIPUNCTURE: CPT

## 2025-01-05 PROCEDURE — 71045 X-RAY EXAM CHEST 1 VIEW: CPT

## 2025-01-05 PROCEDURE — 87426 SARSCOV CORONAVIRUS AG IA: CPT

## 2025-01-05 PROCEDURE — 81001 URINALYSIS AUTO W/SCOPE: CPT

## 2025-01-05 PROCEDURE — 93017 CV STRESS TEST TRACING ONLY: CPT

## 2025-01-05 PROCEDURE — 87804 INFLUENZA ASSAY W/OPTIC: CPT

## 2025-01-05 PROCEDURE — 84443 ASSAY THYROID STIM HORMONE: CPT

## 2025-01-05 PROCEDURE — 82306 VITAMIN D 25 HYDROXY: CPT

## 2025-01-05 PROCEDURE — 84484 ASSAY OF TROPONIN QUANT: CPT

## 2025-01-05 PROCEDURE — 93306 TTE W/DOPPLER COMPLETE: CPT

## 2025-01-05 PROCEDURE — 82607 VITAMIN B-12: CPT

## 2025-01-05 PROCEDURE — 83036 HEMOGLOBIN GLYCOSYLATED A1C: CPT

## 2025-01-05 PROCEDURE — 80048 BASIC METABOLIC PNL TOTAL CA: CPT

## 2025-01-05 PROCEDURE — 94640 AIRWAY INHALATION TREATMENT: CPT

## 2025-01-05 PROCEDURE — 93005 ELECTROCARDIOGRAM TRACING: CPT

## 2025-01-05 PROCEDURE — 78452 HT MUSCLE IMAGE SPECT MULT: CPT

## 2025-01-05 PROCEDURE — 99291 CRITICAL CARE FIRST HOUR: CPT

## 2025-01-05 PROCEDURE — 83880 ASSAY OF NATRIURETIC PEPTIDE: CPT

## 2025-01-05 PROCEDURE — 80053 COMPREHEN METABOLIC PANEL: CPT

## 2025-01-05 PROCEDURE — 85025 COMPLETE CBC W/AUTO DIFF WBC: CPT

## 2025-01-05 RX ADMIN — GABAPENTIN SCH MG: 400 CAPSULE ORAL at 22:36

## 2025-01-05 RX ADMIN — FUROSEMIDE ONE MG: 10 INJECTION, SOLUTION INTRAMUSCULAR; INTRAVENOUS at 21:47

## 2025-01-05 RX ADMIN — ONDANSETRON HYDROCHLORIDE ONE MG: 2 INJECTION, SOLUTION INTRAMUSCULAR; INTRAVENOUS at 18:30

## 2025-01-05 RX ADMIN — ATORVASTATIN CALCIUM SCH MG: 20 TABLET, FILM COATED ORAL at 22:35

## 2025-01-05 RX ADMIN — HYDROMORPHONE HYDROCHLORIDE ONE MG: 2 INJECTION, SOLUTION INTRAMUSCULAR; INTRAVENOUS; SUBCUTANEOUS at 18:31

## 2025-01-06 VITALS — OXYGEN SATURATION: 95 % | RESPIRATION RATE: 14 BRPM | HEART RATE: 53 BPM

## 2025-01-06 VITALS — HEART RATE: 52 BPM | RESPIRATION RATE: 18 BRPM | OXYGEN SATURATION: 96 %

## 2025-01-06 LAB
ALBUMIN SERPL-MCNC: 3.6 G/DL (ref 3.2–4.8)
ALP SERPL-CCNC: 77 U/L (ref 46–116)
ALT SERPL-CCNC: 26 U/L (ref 7–40)
ANION GAP SERPL CALCULATED.3IONS-SCNC: 6 MMOL/L (ref 5–15)
BILIRUB SERPL-MCNC: 0.2 MG/DL (ref 0.2–1)
BUN SERPL-MCNC: 49 MG/DL (ref 9–23)
BUN/CREAT SERPL: 20.8 (ref 10–20)
CALCIUM SERPL-MCNC: 9.9 MG/DL (ref 8.7–10.4)
CHLORIDE SERPL-SCNC: 110 MMOL/L (ref 98–107)
CO2 SERPL-SCNC: 29 MMOL/L (ref 20–31)
GLUCOSE SERPL-MCNC: 104 MG/DL (ref 74–106)
HCT VFR BLD AUTO: 30.4 % (ref 41–53)
HGB BLD-MCNC: 10.1 G/DL (ref 13.5–17.5)
MCH RBC QN AUTO: 31.4 PG (ref 28–32)
MCV RBC AUTO: 94.8 FL (ref 80–100)
NRBC BLD QL AUTO: 0.1 %
POTASSIUM SERPL-SCNC: 3.9 MMOL/L (ref 3.5–5.1)
PROT SERPL-MCNC: 7 G/DL (ref 5.7–8.2)
SARS-COV+SARS-COV-2 AG RESP QL IA.RAPID: NEGATIVE
SODIUM SERPL-SCNC: 145 MMOL/L (ref 136–145)

## 2025-01-06 RX ADMIN — FUROSEMIDE SCH MG: 40 TABLET ORAL at 10:02

## 2025-01-06 RX ADMIN — CLOPIDOGREL BISULFATE SCH MG: 75 TABLET ORAL at 10:02

## 2025-01-06 RX ADMIN — NYSTATIN SCH APPLIC: 100000 POWDER TOPICAL at 21:40

## 2025-01-06 RX ADMIN — HYDROCODONE BITARTRATE AND ACETAMINOPHEN ONE TAB: 10; 325 TABLET ORAL at 18:20

## 2025-01-07 VITALS — OXYGEN SATURATION: 100 %

## 2025-01-07 LAB
ANION GAP SERPL CALCULATED.3IONS-SCNC: 6 MMOL/L (ref 5–15)
BUN SERPL-MCNC: 49 MG/DL (ref 9–23)
BUN/CREAT SERPL: 19.1 (ref 10–20)
CALCIUM SERPL-MCNC: 10 MG/DL (ref 8.7–10.4)
CHLORIDE SERPL-SCNC: 108 MMOL/L (ref 98–107)
CO2 SERPL-SCNC: 30 MMOL/L (ref 20–31)
GLUCOSE SERPL-MCNC: 88 MG/DL (ref 74–106)
HCT VFR BLD AUTO: 31 % (ref 41–53)
HGB BLD-MCNC: 10.2 G/DL (ref 13.5–17.5)
MCH RBC QN AUTO: 31 PG (ref 28–32)
MCV RBC AUTO: 94.8 FL (ref 80–100)
NRBC BLD QL AUTO: 0.1 %
POTASSIUM SERPL-SCNC: 3.9 MMOL/L (ref 3.5–5.1)
SODIUM SERPL-SCNC: 144 MMOL/L (ref 136–145)

## 2025-01-08 VITALS
RESPIRATION RATE: 18 BRPM | SYSTOLIC BLOOD PRESSURE: 112 MMHG | TEMPERATURE: 98.7 F | OXYGEN SATURATION: 98 % | HEART RATE: 70 BPM | DIASTOLIC BLOOD PRESSURE: 39 MMHG

## 2025-01-08 VITALS — OXYGEN SATURATION: 100 % | RESPIRATION RATE: 20 BRPM | HEART RATE: 69 BPM

## 2025-01-08 VITALS — RESPIRATION RATE: 18 BRPM | HEART RATE: 85 BPM | OXYGEN SATURATION: 99 %

## 2025-01-08 VITALS — OXYGEN SATURATION: 95 % | HEART RATE: 81 BPM | RESPIRATION RATE: 20 BRPM

## 2025-01-08 VITALS — TEMPERATURE: 98.5 F | OXYGEN SATURATION: 99 % | RESPIRATION RATE: 15 BRPM | HEART RATE: 59 BPM

## 2025-01-08 VITALS — HEART RATE: 54 BPM | RESPIRATION RATE: 12 BRPM | OXYGEN SATURATION: 97 %

## 2025-01-08 VITALS — OXYGEN SATURATION: 97 %

## 2025-01-08 LAB
ANION GAP SERPL CALCULATED.3IONS-SCNC: 5 MMOL/L (ref 5–15)
BUN SERPL-MCNC: 48 MG/DL (ref 9–23)
BUN/CREAT SERPL: 18.6 (ref 10–20)
CALCIUM SERPL-MCNC: 9.8 MG/DL (ref 8.7–10.4)
CHLORIDE SERPL-SCNC: 109 MMOL/L (ref 98–107)
CO2 SERPL-SCNC: 30 MMOL/L (ref 20–31)
GLUCOSE SERPL-MCNC: 119 MG/DL (ref 74–106)
HCT VFR BLD AUTO: 28.5 % (ref 41–53)
HGB BLD-MCNC: 9.2 G/DL (ref 13.5–17.5)
MCH RBC QN AUTO: 30.9 PG (ref 28–32)
MCV RBC AUTO: 95.6 FL (ref 80–100)
NRBC BLD QL AUTO: 0 %
POTASSIUM SERPL-SCNC: 4.2 MMOL/L (ref 3.5–5.1)
SODIUM SERPL-SCNC: 144 MMOL/L (ref 136–145)

## 2025-01-08 RX ADMIN — HYDROCODONE BITARTRATE AND ACETAMINOPHEN PRN TAB: 5; 325 TABLET ORAL at 00:58

## 2025-01-08 RX ADMIN — SODIUM CHLORIDE ONE MLS/HR: 0.9 INJECTION, SOLUTION INTRAVENOUS at 13:35

## 2025-01-08 RX ADMIN — ALBUTEROL SULFATE PRN MG: 2.5 SOLUTION RESPIRATORY (INHALATION) at 00:24

## 2025-01-08 RX ADMIN — HYDROCODONE BITARTRATE AND ACETAMINOPHEN PRN TAB: 10; 325 TABLET ORAL at 18:07

## 2025-01-08 RX ADMIN — IPRATROPIUM BROMIDE PRN MG: 0.5 SOLUTION RESPIRATORY (INHALATION) at 00:24

## 2025-01-08 RX ADMIN — REGADENOSON ONE MG: 0.08 INJECTION, SOLUTION INTRAVENOUS at 05:10

## 2025-01-08 RX ADMIN — FUROSEMIDE ONE MG: 40 TABLET ORAL at 03:22

## 2025-01-09 VITALS
HEART RATE: 52 BPM | RESPIRATION RATE: 16 BRPM | SYSTOLIC BLOOD PRESSURE: 113 MMHG | DIASTOLIC BLOOD PRESSURE: 61 MMHG | OXYGEN SATURATION: 95 %

## 2025-01-09 VITALS — OXYGEN SATURATION: 98 % | RESPIRATION RATE: 16 BRPM | HEART RATE: 59 BPM

## 2025-01-09 VITALS — OXYGEN SATURATION: 98 % | RESPIRATION RATE: 16 BRPM | HEART RATE: 58 BPM

## 2025-01-09 VITALS — OXYGEN SATURATION: 94 %

## 2025-03-17 ENCOUNTER — HOSPITAL ENCOUNTER (INPATIENT)
Dept: HOSPITAL 15 - ER | Age: 70
LOS: 3 days | Discharge: HOME | DRG: 291 | End: 2025-03-20
Attending: FAMILY MEDICINE | Admitting: FAMILY MEDICINE
Payer: COMMERCIAL

## 2025-03-17 VITALS — RESPIRATION RATE: 16 BRPM | OXYGEN SATURATION: 94 % | HEART RATE: 76 BPM

## 2025-03-17 VITALS — HEART RATE: 72 BPM | OXYGEN SATURATION: 94 % | RESPIRATION RATE: 12 BRPM

## 2025-03-17 VITALS
RESPIRATION RATE: 18 BRPM | HEART RATE: 88 BPM | TEMPERATURE: 97.6 F | OXYGEN SATURATION: 96 % | SYSTOLIC BLOOD PRESSURE: 116 MMHG | DIASTOLIC BLOOD PRESSURE: 62 MMHG

## 2025-03-17 VITALS — BODY MASS INDEX: 47.74 KG/M2 | HEIGHT: 68 IN | WEIGHT: 315 LBS

## 2025-03-17 VITALS — OXYGEN SATURATION: 95 %

## 2025-03-17 VITALS — OXYGEN SATURATION: 96 %

## 2025-03-17 VITALS — OXYGEN SATURATION: 96 % | RESPIRATION RATE: 14 BRPM

## 2025-03-17 DIAGNOSIS — I69.398: ICD-10-CM

## 2025-03-17 DIAGNOSIS — N18.9: ICD-10-CM

## 2025-03-17 DIAGNOSIS — Z79.899: ICD-10-CM

## 2025-03-17 DIAGNOSIS — I13.0: Primary | ICD-10-CM

## 2025-03-17 DIAGNOSIS — F41.9: ICD-10-CM

## 2025-03-17 DIAGNOSIS — Z79.82: ICD-10-CM

## 2025-03-17 DIAGNOSIS — Z82.3: ICD-10-CM

## 2025-03-17 DIAGNOSIS — F32.A: ICD-10-CM

## 2025-03-17 DIAGNOSIS — Z82.49: ICD-10-CM

## 2025-03-17 DIAGNOSIS — J44.1: ICD-10-CM

## 2025-03-17 DIAGNOSIS — N17.9: ICD-10-CM

## 2025-03-17 DIAGNOSIS — Z82.5: ICD-10-CM

## 2025-03-17 DIAGNOSIS — G89.4: ICD-10-CM

## 2025-03-17 DIAGNOSIS — Z81.8: ICD-10-CM

## 2025-03-17 DIAGNOSIS — I50.43: ICD-10-CM

## 2025-03-17 DIAGNOSIS — E66.01: ICD-10-CM

## 2025-03-17 DIAGNOSIS — E11.22: ICD-10-CM

## 2025-03-17 DIAGNOSIS — Z83.3: ICD-10-CM

## 2025-03-17 DIAGNOSIS — I25.2: ICD-10-CM

## 2025-03-17 DIAGNOSIS — I25.10: ICD-10-CM

## 2025-03-17 LAB
ANION GAP SERPL CALCULATED.3IONS-SCNC: 7 MMOL/L (ref 5–15)
BUN SERPL-MCNC: 29 MG/DL (ref 9–23)
BUN/CREAT SERPL: 16 (ref 10–20)
CALCIUM SERPL-MCNC: 10 MG/DL (ref 8.7–10.4)
CHLORIDE SERPL-SCNC: 112 MMOL/L (ref 98–107)
CO2 SERPL-SCNC: 24 MMOL/L (ref 20–31)
GLUCOSE SERPL-MCNC: 92 MG/DL (ref 74–106)
HCT VFR BLD AUTO: 32.5 % (ref 41–53)
HGB BLD-MCNC: 10.8 G/DL (ref 13.5–17.5)
MCH RBC QN AUTO: 31.5 PG (ref 28–32)
MCV RBC AUTO: 95.1 FL (ref 80–100)
NRBC BLD QL AUTO: 0.2 %
POTASSIUM SERPL-SCNC: 4.1 MMOL/L (ref 3.5–5.1)
SODIUM SERPL-SCNC: 143 MMOL/L (ref 136–145)

## 2025-03-17 PROCEDURE — 96374 THER/PROPH/DIAG INJ IV PUSH: CPT

## 2025-03-17 PROCEDURE — 83880 ASSAY OF NATRIURETIC PEPTIDE: CPT

## 2025-03-17 PROCEDURE — 82962 GLUCOSE BLOOD TEST: CPT

## 2025-03-17 PROCEDURE — 81001 URINALYSIS AUTO W/SCOPE: CPT

## 2025-03-17 PROCEDURE — 97163 PT EVAL HIGH COMPLEX 45 MIN: CPT

## 2025-03-17 PROCEDURE — 85025 COMPLETE CBC W/AUTO DIFF WBC: CPT

## 2025-03-17 PROCEDURE — 71045 X-RAY EXAM CHEST 1 VIEW: CPT

## 2025-03-17 PROCEDURE — 97110 THERAPEUTIC EXERCISES: CPT

## 2025-03-17 PROCEDURE — 97530 THERAPEUTIC ACTIVITIES: CPT

## 2025-03-17 PROCEDURE — 94640 AIRWAY INHALATION TREATMENT: CPT

## 2025-03-17 PROCEDURE — 84484 ASSAY OF TROPONIN QUANT: CPT

## 2025-03-17 PROCEDURE — 96375 TX/PRO/DX INJ NEW DRUG ADDON: CPT

## 2025-03-17 PROCEDURE — 80053 COMPREHEN METABOLIC PANEL: CPT

## 2025-03-17 PROCEDURE — 93005 ELECTROCARDIOGRAM TRACING: CPT

## 2025-03-17 PROCEDURE — 99291 CRITICAL CARE FIRST HOUR: CPT

## 2025-03-17 PROCEDURE — 80048 BASIC METABOLIC PNL TOTAL CA: CPT

## 2025-03-17 PROCEDURE — 36415 COLL VENOUS BLD VENIPUNCTURE: CPT

## 2025-03-17 RX ADMIN — GABAPENTIN SCH MG: 400 CAPSULE ORAL at 22:00

## 2025-03-17 RX ADMIN — ATORVASTATIN CALCIUM SCH MG: 20 TABLET, FILM COATED ORAL at 22:00

## 2025-03-17 RX ADMIN — SODIUM CHLORIDE SCH ML: 9 INJECTION INTRAMUSCULAR; INTRAVENOUS; SUBCUTANEOUS at 14:08

## 2025-03-17 RX ADMIN — FUROSEMIDE ONE MG: 10 INJECTION, SOLUTION INTRAMUSCULAR; INTRAVENOUS at 11:00

## 2025-03-17 RX ADMIN — MORPHINE SULFATE PRN MG: 2 INJECTION, SOLUTION INTRAMUSCULAR; INTRAVENOUS at 11:21

## 2025-03-18 VITALS
OXYGEN SATURATION: 99 % | SYSTOLIC BLOOD PRESSURE: 100 MMHG | HEART RATE: 62 BPM | RESPIRATION RATE: 18 BRPM | DIASTOLIC BLOOD PRESSURE: 59 MMHG | TEMPERATURE: 98.6 F

## 2025-03-18 VITALS — RESPIRATION RATE: 20 BRPM | HEART RATE: 61 BPM | OXYGEN SATURATION: 100 %

## 2025-03-18 VITALS
DIASTOLIC BLOOD PRESSURE: 51 MMHG | OXYGEN SATURATION: 95 % | RESPIRATION RATE: 20 BRPM | SYSTOLIC BLOOD PRESSURE: 106 MMHG | TEMPERATURE: 97.8 F | HEART RATE: 56 BPM

## 2025-03-18 VITALS
RESPIRATION RATE: 20 BRPM | SYSTOLIC BLOOD PRESSURE: 111 MMHG | HEART RATE: 56 BPM | DIASTOLIC BLOOD PRESSURE: 56 MMHG | OXYGEN SATURATION: 100 % | TEMPERATURE: 97.4 F

## 2025-03-18 VITALS — OXYGEN SATURATION: 96 %

## 2025-03-18 VITALS — RESPIRATION RATE: 20 BRPM | OXYGEN SATURATION: 100 % | HEART RATE: 55 BPM

## 2025-03-18 VITALS
SYSTOLIC BLOOD PRESSURE: 94 MMHG | HEART RATE: 130 BPM | TEMPERATURE: 97.8 F | RESPIRATION RATE: 20 BRPM | OXYGEN SATURATION: 95 % | DIASTOLIC BLOOD PRESSURE: 72 MMHG

## 2025-03-18 VITALS — OXYGEN SATURATION: 96 % | HEART RATE: 58 BPM | RESPIRATION RATE: 20 BRPM

## 2025-03-18 VITALS
SYSTOLIC BLOOD PRESSURE: 98 MMHG | TEMPERATURE: 97.6 F | OXYGEN SATURATION: 96 % | DIASTOLIC BLOOD PRESSURE: 51 MMHG | HEART RATE: 75 BPM | RESPIRATION RATE: 21 BRPM

## 2025-03-18 VITALS — RESPIRATION RATE: 20 BRPM | HEART RATE: 60 BPM

## 2025-03-18 VITALS
DIASTOLIC BLOOD PRESSURE: 65 MMHG | RESPIRATION RATE: 20 BRPM | SYSTOLIC BLOOD PRESSURE: 111 MMHG | TEMPERATURE: 97.7 F | HEART RATE: 105 BPM | OXYGEN SATURATION: 100 %

## 2025-03-18 VITALS — HEART RATE: 57 BPM

## 2025-03-18 VITALS
HEART RATE: 77 BPM | SYSTOLIC BLOOD PRESSURE: 116 MMHG | TEMPERATURE: 97.5 F | OXYGEN SATURATION: 100 % | RESPIRATION RATE: 18 BRPM | DIASTOLIC BLOOD PRESSURE: 69 MMHG

## 2025-03-18 VITALS — OXYGEN SATURATION: 99 %

## 2025-03-18 VITALS — OXYGEN SATURATION: 99 % | RESPIRATION RATE: 20 BRPM | HEART RATE: 61 BPM

## 2025-03-18 VITALS — OXYGEN SATURATION: 100 %

## 2025-03-18 LAB
ALBUMIN SERPL-MCNC: 4.1 G/DL (ref 3.2–4.8)
ALP SERPL-CCNC: 54 U/L (ref 46–116)
ALT SERPL-CCNC: 10 U/L (ref 7–40)
ANION GAP SERPL CALCULATED.3IONS-SCNC: 7 MMOL/L (ref 5–15)
BILIRUB SERPL-MCNC: 0.4 MG/DL (ref 0.2–1)
BUN SERPL-MCNC: 34 MG/DL (ref 9–23)
BUN/CREAT SERPL: 17.6 (ref 10–20)
CALCIUM SERPL-MCNC: 9.9 MG/DL (ref 8.7–10.4)
CHLORIDE SERPL-SCNC: 109 MMOL/L (ref 98–107)
CO2 SERPL-SCNC: 29 MMOL/L (ref 20–31)
GLUCOSE SERPL-MCNC: 83 MG/DL (ref 74–106)
HCT VFR BLD AUTO: 31.9 % (ref 41–53)
HGB BLD-MCNC: 10.5 G/DL (ref 13.5–17.5)
MCH RBC QN AUTO: 30.8 PG (ref 28–32)
MCV RBC AUTO: 93.9 FL (ref 80–100)
NRBC BLD QL AUTO: 0 %
POTASSIUM SERPL-SCNC: 3.9 MMOL/L (ref 3.5–5.1)
PROT SERPL-MCNC: 7.4 G/DL (ref 5.7–8.2)
SODIUM SERPL-SCNC: 145 MMOL/L (ref 136–145)

## 2025-03-18 PROCEDURE — B54NZZA ULTRASONOGRAPHY OF LEFT UPPER EXTREMITY VEINS, GUIDANCE: ICD-10-PCS | Performed by: FAMILY MEDICINE

## 2025-03-18 PROCEDURE — 05HF33Z INSERTION OF INFUSION DEVICE INTO LEFT CEPHALIC VEIN, PERCUTANEOUS APPROACH: ICD-10-PCS | Performed by: FAMILY MEDICINE

## 2025-03-18 RX ADMIN — ISOSORBIDE MONONITRATE SCH MG: 60 TABLET, EXTENDED RELEASE ORAL at 09:41

## 2025-03-18 RX ADMIN — Medication SCH MG: at 09:37

## 2025-03-18 RX ADMIN — HYDROCODONE BITARTRATE AND ACETAMINOPHEN PRN TAB: 5; 325 TABLET ORAL at 00:50

## 2025-03-18 RX ADMIN — METOPROLOL SUCCINATE SCH MG: 50 TABLET, EXTENDED RELEASE ORAL at 09:41

## 2025-03-18 RX ADMIN — FUROSEMIDE SCH MG: 10 INJECTION, SOLUTION INTRAMUSCULAR; INTRAVENOUS at 09:36

## 2025-03-18 RX ADMIN — CLOPIDOGREL BISULFATE SCH MG: 75 TABLET ORAL at 09:41

## 2025-03-18 RX ADMIN — IPRATROPIUM BROMIDE PRN MG: 0.5 SOLUTION RESPIRATORY (INHALATION) at 06:29

## 2025-03-18 RX ADMIN — ALBUTEROL SULFATE SCH TAB: 90 AEROSOL, METERED RESPIRATORY (INHALATION) at 09:37

## 2025-03-18 RX ADMIN — ALBUTEROL SULFATE PRN MG: 2.5 SOLUTION RESPIRATORY (INHALATION) at 06:29

## 2025-03-19 VITALS
HEART RATE: 62 BPM | OXYGEN SATURATION: 100 % | RESPIRATION RATE: 18 BRPM | TEMPERATURE: 97.3 F | SYSTOLIC BLOOD PRESSURE: 90 MMHG | DIASTOLIC BLOOD PRESSURE: 49 MMHG

## 2025-03-19 VITALS
TEMPERATURE: 98.2 F | RESPIRATION RATE: 19 BRPM | OXYGEN SATURATION: 98 % | HEART RATE: 50 BPM | SYSTOLIC BLOOD PRESSURE: 88 MMHG | DIASTOLIC BLOOD PRESSURE: 46 MMHG

## 2025-03-19 VITALS
RESPIRATION RATE: 17 BRPM | HEART RATE: 95 BPM | OXYGEN SATURATION: 93 % | TEMPERATURE: 97.7 F | DIASTOLIC BLOOD PRESSURE: 58 MMHG | SYSTOLIC BLOOD PRESSURE: 108 MMHG

## 2025-03-19 VITALS
HEART RATE: 51 BPM | TEMPERATURE: 97.7 F | DIASTOLIC BLOOD PRESSURE: 56 MMHG | SYSTOLIC BLOOD PRESSURE: 107 MMHG | RESPIRATION RATE: 20 BRPM | OXYGEN SATURATION: 99 %

## 2025-03-19 VITALS
DIASTOLIC BLOOD PRESSURE: 40 MMHG | SYSTOLIC BLOOD PRESSURE: 95 MMHG | OXYGEN SATURATION: 100 % | HEART RATE: 51 BPM | RESPIRATION RATE: 20 BRPM | TEMPERATURE: 98.1 F

## 2025-03-19 VITALS — HEART RATE: 54 BPM

## 2025-03-19 VITALS — DIASTOLIC BLOOD PRESSURE: 49 MMHG | SYSTOLIC BLOOD PRESSURE: 97 MMHG

## 2025-03-19 VITALS — OXYGEN SATURATION: 100 %

## 2025-03-19 VITALS
SYSTOLIC BLOOD PRESSURE: 101 MMHG | TEMPERATURE: 96.8 F | HEART RATE: 54 BPM | OXYGEN SATURATION: 98 % | RESPIRATION RATE: 20 BRPM | DIASTOLIC BLOOD PRESSURE: 50 MMHG

## 2025-03-19 VITALS — HEART RATE: 53 BPM | SYSTOLIC BLOOD PRESSURE: 80 MMHG | DIASTOLIC BLOOD PRESSURE: 44 MMHG

## 2025-03-19 VITALS — OXYGEN SATURATION: 92 %

## 2025-03-19 VITALS — HEART RATE: 56 BPM

## 2025-03-19 VITALS — RESPIRATION RATE: 20 BRPM

## 2025-03-19 RX ADMIN — MORPHINE SULFATE PRN MG: 2 INJECTION, SOLUTION INTRAMUSCULAR; INTRAVENOUS at 09:07

## 2025-03-20 VITALS
TEMPERATURE: 97.4 F | RESPIRATION RATE: 17 BRPM | HEART RATE: 50 BPM | OXYGEN SATURATION: 97 % | SYSTOLIC BLOOD PRESSURE: 94 MMHG | DIASTOLIC BLOOD PRESSURE: 47 MMHG

## 2025-03-20 VITALS — SYSTOLIC BLOOD PRESSURE: 94 MMHG | DIASTOLIC BLOOD PRESSURE: 38 MMHG | HEART RATE: 67 BPM

## 2025-03-20 VITALS
OXYGEN SATURATION: 97 % | RESPIRATION RATE: 16 BRPM | SYSTOLIC BLOOD PRESSURE: 102 MMHG | TEMPERATURE: 98.1 F | DIASTOLIC BLOOD PRESSURE: 53 MMHG | HEART RATE: 69 BPM

## 2025-03-20 VITALS
HEART RATE: 67 BPM | OXYGEN SATURATION: 97 % | RESPIRATION RATE: 17 BRPM | DIASTOLIC BLOOD PRESSURE: 47 MMHG | SYSTOLIC BLOOD PRESSURE: 94 MMHG

## 2025-03-20 VITALS
OXYGEN SATURATION: 99 % | SYSTOLIC BLOOD PRESSURE: 108 MMHG | TEMPERATURE: 97.6 F | RESPIRATION RATE: 16 BRPM | DIASTOLIC BLOOD PRESSURE: 49 MMHG | HEART RATE: 67 BPM

## 2025-03-20 VITALS
TEMPERATURE: 98.3 F | DIASTOLIC BLOOD PRESSURE: 46 MMHG | OXYGEN SATURATION: 99 % | HEART RATE: 60 BPM | RESPIRATION RATE: 19 BRPM | SYSTOLIC BLOOD PRESSURE: 85 MMHG

## 2025-03-20 VITALS — DIASTOLIC BLOOD PRESSURE: 49 MMHG | SYSTOLIC BLOOD PRESSURE: 99 MMHG | HEART RATE: 59 BPM

## 2025-03-20 VITALS
RESPIRATION RATE: 19 BRPM | HEART RATE: 64 BPM | OXYGEN SATURATION: 98 % | TEMPERATURE: 98.4 F | SYSTOLIC BLOOD PRESSURE: 112 MMHG | DIASTOLIC BLOOD PRESSURE: 58 MMHG

## 2025-03-20 VITALS — HEART RATE: 54 BPM | DIASTOLIC BLOOD PRESSURE: 47 MMHG | SYSTOLIC BLOOD PRESSURE: 95 MMHG

## 2025-03-20 VITALS — HEART RATE: 56 BPM

## 2025-03-20 VITALS — RESPIRATION RATE: 20 BRPM | HEART RATE: 50 BPM | OXYGEN SATURATION: 99 %

## 2025-03-20 VITALS — OXYGEN SATURATION: 100 % | RESPIRATION RATE: 20 BRPM | HEART RATE: 52 BPM

## 2025-03-20 VITALS — OXYGEN SATURATION: 97 %

## 2025-03-20 RX ADMIN — HYDROCODONE BITARTRATE AND ACETAMINOPHEN PRN TAB: 5; 325 TABLET ORAL at 00:50

## 2025-04-05 ENCOUNTER — HOSPITAL ENCOUNTER (INPATIENT)
Dept: HOSPITAL 15 - ER | Age: 70
LOS: 5 days | Discharge: HOME | DRG: 291 | End: 2025-04-10
Payer: COMMERCIAL

## 2025-04-05 VITALS — OXYGEN SATURATION: 100 % | RESPIRATION RATE: 12 BRPM | HEART RATE: 57 BPM

## 2025-04-05 VITALS
HEART RATE: 58 BPM | RESPIRATION RATE: 18 BRPM | OXYGEN SATURATION: 100 % | DIASTOLIC BLOOD PRESSURE: 51 MMHG | SYSTOLIC BLOOD PRESSURE: 111 MMHG | TEMPERATURE: 97.6 F

## 2025-04-05 VITALS
DIASTOLIC BLOOD PRESSURE: 40 MMHG | SYSTOLIC BLOOD PRESSURE: 111 MMHG | OXYGEN SATURATION: 100 % | RESPIRATION RATE: 18 BRPM | HEART RATE: 60 BPM | TEMPERATURE: 96.5 F

## 2025-04-05 VITALS — WEIGHT: 315 LBS | BODY MASS INDEX: 47.74 KG/M2 | HEIGHT: 68 IN

## 2025-04-05 VITALS
RESPIRATION RATE: 19 BRPM | DIASTOLIC BLOOD PRESSURE: 61 MMHG | HEART RATE: 54 BPM | SYSTOLIC BLOOD PRESSURE: 118 MMHG | OXYGEN SATURATION: 99 % | TEMPERATURE: 97.4 F

## 2025-04-05 VITALS — RESPIRATION RATE: 18 BRPM | HEART RATE: 108 BPM | OXYGEN SATURATION: 100 %

## 2025-04-05 VITALS — RESPIRATION RATE: 18 BRPM | OXYGEN SATURATION: 100 % | HEART RATE: 60 BPM

## 2025-04-05 VITALS — HEART RATE: 67 BPM | RESPIRATION RATE: 18 BRPM | OXYGEN SATURATION: 97 %

## 2025-04-05 VITALS — HEART RATE: 66 BPM | OXYGEN SATURATION: 96 % | RESPIRATION RATE: 18 BRPM

## 2025-04-05 DIAGNOSIS — K21.9: ICD-10-CM

## 2025-04-05 DIAGNOSIS — I13.0: Primary | ICD-10-CM

## 2025-04-05 DIAGNOSIS — Z79.82: ICD-10-CM

## 2025-04-05 DIAGNOSIS — I25.10: ICD-10-CM

## 2025-04-05 DIAGNOSIS — G89.4: ICD-10-CM

## 2025-04-05 DIAGNOSIS — E66.01: ICD-10-CM

## 2025-04-05 DIAGNOSIS — I48.91: ICD-10-CM

## 2025-04-05 DIAGNOSIS — E78.5: ICD-10-CM

## 2025-04-05 DIAGNOSIS — F32.A: ICD-10-CM

## 2025-04-05 DIAGNOSIS — Z74.01: ICD-10-CM

## 2025-04-05 DIAGNOSIS — G47.33: ICD-10-CM

## 2025-04-05 DIAGNOSIS — J44.1: ICD-10-CM

## 2025-04-05 DIAGNOSIS — I25.2: ICD-10-CM

## 2025-04-05 DIAGNOSIS — I69.351: ICD-10-CM

## 2025-04-05 DIAGNOSIS — Z99.81: ICD-10-CM

## 2025-04-05 DIAGNOSIS — I50.33: ICD-10-CM

## 2025-04-05 DIAGNOSIS — Z82.49: ICD-10-CM

## 2025-04-05 DIAGNOSIS — E89.0: ICD-10-CM

## 2025-04-05 DIAGNOSIS — Z82.5: ICD-10-CM

## 2025-04-05 DIAGNOSIS — Z79.899: ICD-10-CM

## 2025-04-05 DIAGNOSIS — F41.9: ICD-10-CM

## 2025-04-05 DIAGNOSIS — E11.22: ICD-10-CM

## 2025-04-05 DIAGNOSIS — Z79.02: ICD-10-CM

## 2025-04-05 DIAGNOSIS — I49.5: ICD-10-CM

## 2025-04-05 DIAGNOSIS — Z82.3: ICD-10-CM

## 2025-04-05 DIAGNOSIS — M54.9: ICD-10-CM

## 2025-04-05 DIAGNOSIS — G40.909: ICD-10-CM

## 2025-04-05 DIAGNOSIS — Z95.5: ICD-10-CM

## 2025-04-05 DIAGNOSIS — N17.0: ICD-10-CM

## 2025-04-05 DIAGNOSIS — Z83.3: ICD-10-CM

## 2025-04-05 DIAGNOSIS — Z81.8: ICD-10-CM

## 2025-04-05 DIAGNOSIS — J96.21: ICD-10-CM

## 2025-04-05 DIAGNOSIS — Z95.0: ICD-10-CM

## 2025-04-05 DIAGNOSIS — Z79.84: ICD-10-CM

## 2025-04-05 DIAGNOSIS — N18.9: ICD-10-CM

## 2025-04-05 LAB
ALBUMIN SERPL-MCNC: 3.9 G/DL (ref 3.2–4.8)
ALP SERPL-CCNC: 63 U/L (ref 46–116)
ALT SERPL-CCNC: 24 U/L (ref 7–40)
ANION GAP SERPL CALCULATED.3IONS-SCNC: 7 MMOL/L (ref 5–15)
BILIRUB SERPL-MCNC: 0.4 MG/DL (ref 0.2–1)
BUN SERPL-MCNC: 32 MG/DL (ref 9–23)
BUN/CREAT SERPL: 17.8 (ref 10–20)
CHLORIDE SERPL-SCNC: 112 MMOL/L (ref 98–107)
CO2 SERPL-SCNC: 23 MMOL/L (ref 20–31)
GLUCOSE SERPL-MCNC: 81 MG/DL (ref 74–106)
HCT VFR BLD AUTO: 31.6 % (ref 41–53)
HGB BLD-MCNC: 10.2 G/DL (ref 13.5–17.5)
MCH RBC QN AUTO: 30.5 PG (ref 28–32)
MCV RBC AUTO: 94.2 FL (ref 80–100)
PROT SERPL-MCNC: 7.4 G/DL (ref 5.7–8.2)
SODIUM SERPL-SCNC: 142 MMOL/L (ref 136–145)

## 2025-04-05 PROCEDURE — 83605 ASSAY OF LACTIC ACID: CPT

## 2025-04-05 PROCEDURE — 85025 COMPLETE CBC W/AUTO DIFF WBC: CPT

## 2025-04-05 PROCEDURE — 80048 BASIC METABOLIC PNL TOTAL CA: CPT

## 2025-04-05 PROCEDURE — 80053 COMPREHEN METABOLIC PANEL: CPT

## 2025-04-05 PROCEDURE — 87040 BLOOD CULTURE FOR BACTERIA: CPT

## 2025-04-05 PROCEDURE — 36415 COLL VENOUS BLD VENIPUNCTURE: CPT

## 2025-04-05 PROCEDURE — 83880 ASSAY OF NATRIURETIC PEPTIDE: CPT

## 2025-04-05 PROCEDURE — 83735 ASSAY OF MAGNESIUM: CPT

## 2025-04-05 PROCEDURE — 87081 CULTURE SCREEN ONLY: CPT

## 2025-04-05 PROCEDURE — 82565 ASSAY OF CREATININE: CPT

## 2025-04-05 PROCEDURE — 93005 ELECTROCARDIOGRAM TRACING: CPT

## 2025-04-05 PROCEDURE — 71045 X-RAY EXAM CHEST 1 VIEW: CPT

## 2025-04-05 PROCEDURE — 99291 CRITICAL CARE FIRST HOUR: CPT

## 2025-04-05 PROCEDURE — 81001 URINALYSIS AUTO W/SCOPE: CPT

## 2025-04-05 PROCEDURE — 84484 ASSAY OF TROPONIN QUANT: CPT

## 2025-04-05 PROCEDURE — 84100 ASSAY OF PHOSPHORUS: CPT

## 2025-04-05 PROCEDURE — 80202 ASSAY OF VANCOMYCIN: CPT

## 2025-04-05 PROCEDURE — 94640 AIRWAY INHALATION TREATMENT: CPT

## 2025-04-05 PROCEDURE — 87077 CULTURE AEROBIC IDENTIFY: CPT

## 2025-04-05 PROCEDURE — 82962 GLUCOSE BLOOD TEST: CPT

## 2025-04-05 PROCEDURE — 87186 SC STD MICRODIL/AGAR DIL: CPT

## 2025-04-05 PROCEDURE — 80307 DRUG TEST PRSMV CHEM ANLYZR: CPT

## 2025-04-05 RX ADMIN — RANOLAZINE SCH MG: 500 TABLET, FILM COATED, EXTENDED RELEASE ORAL at 21:17

## 2025-04-05 RX ADMIN — GABAPENTIN SCH MG: 400 CAPSULE ORAL at 21:17

## 2025-04-05 RX ADMIN — ALBUTEROL SULFATE SCH MG: 2.5 SOLUTION RESPIRATORY (INHALATION) at 19:10

## 2025-04-05 RX ADMIN — FUROSEMIDE ONE MG: 20 TABLET ORAL at 15:19

## 2025-04-05 RX ADMIN — HYDROCODONE BITARTRATE AND ACETAMINOPHEN PRN TAB: 5; 325 TABLET ORAL at 23:42

## 2025-04-05 RX ADMIN — Medication SCH STRIP: at 17:00

## 2025-04-05 RX ADMIN — HUMAN INSULIN SCH UNITS: 100 INJECTION, SOLUTION SUBCUTANEOUS at 17:00

## 2025-04-06 VITALS
SYSTOLIC BLOOD PRESSURE: 102 MMHG | HEART RATE: 51 BPM | OXYGEN SATURATION: 99 % | RESPIRATION RATE: 19 BRPM | TEMPERATURE: 96.1 F | DIASTOLIC BLOOD PRESSURE: 45 MMHG

## 2025-04-06 VITALS
TEMPERATURE: 97.8 F | HEART RATE: 76 BPM | OXYGEN SATURATION: 96 % | SYSTOLIC BLOOD PRESSURE: 100 MMHG | RESPIRATION RATE: 20 BRPM | DIASTOLIC BLOOD PRESSURE: 41 MMHG

## 2025-04-06 VITALS — OXYGEN SATURATION: 99 % | HEART RATE: 55 BPM | RESPIRATION RATE: 16 BRPM

## 2025-04-06 VITALS — HEART RATE: 66 BPM | RESPIRATION RATE: 16 BRPM | OXYGEN SATURATION: 100 %

## 2025-04-06 VITALS — RESPIRATION RATE: 18 BRPM | OXYGEN SATURATION: 100 % | HEART RATE: 51 BPM

## 2025-04-06 VITALS
OXYGEN SATURATION: 99 % | TEMPERATURE: 96 F | RESPIRATION RATE: 18 BRPM | SYSTOLIC BLOOD PRESSURE: 105 MMHG | HEART RATE: 56 BPM | DIASTOLIC BLOOD PRESSURE: 46 MMHG

## 2025-04-06 VITALS
HEART RATE: 57 BPM | TEMPERATURE: 97.8 F | DIASTOLIC BLOOD PRESSURE: 53 MMHG | RESPIRATION RATE: 20 BRPM | SYSTOLIC BLOOD PRESSURE: 102 MMHG | OXYGEN SATURATION: 98 %

## 2025-04-06 VITALS
HEART RATE: 66 BPM | TEMPERATURE: 98.2 F | SYSTOLIC BLOOD PRESSURE: 80 MMHG | RESPIRATION RATE: 19 BRPM | OXYGEN SATURATION: 91 % | DIASTOLIC BLOOD PRESSURE: 44 MMHG

## 2025-04-06 VITALS — HEART RATE: 79 BPM | OXYGEN SATURATION: 100 % | RESPIRATION RATE: 16 BRPM

## 2025-04-06 VITALS — SYSTOLIC BLOOD PRESSURE: 95 MMHG | DIASTOLIC BLOOD PRESSURE: 53 MMHG | HEART RATE: 53 BPM

## 2025-04-06 VITALS — RESPIRATION RATE: 19 BRPM | OXYGEN SATURATION: 91 % | HEART RATE: 56 BPM

## 2025-04-06 VITALS — RESPIRATION RATE: 18 BRPM | OXYGEN SATURATION: 100 % | HEART RATE: 50 BPM

## 2025-04-06 VITALS
TEMPERATURE: 97.9 F | OXYGEN SATURATION: 97 % | SYSTOLIC BLOOD PRESSURE: 101 MMHG | RESPIRATION RATE: 20 BRPM | DIASTOLIC BLOOD PRESSURE: 58 MMHG | HEART RATE: 76 BPM

## 2025-04-06 VITALS — OXYGEN SATURATION: 99 % | HEART RATE: 66 BPM | RESPIRATION RATE: 16 BRPM

## 2025-04-06 VITALS — SYSTOLIC BLOOD PRESSURE: 100 MMHG | DIASTOLIC BLOOD PRESSURE: 41 MMHG

## 2025-04-06 VITALS — HEART RATE: 55 BPM

## 2025-04-06 VITALS — OXYGEN SATURATION: 98 %

## 2025-04-06 LAB
ALBUMIN SERPL-MCNC: 3.8 G/DL (ref 3.2–4.8)
ALP SERPL-CCNC: 59 U/L (ref 46–116)
ALT SERPL-CCNC: 21 U/L (ref 7–40)
ANION GAP SERPL CALCULATED.3IONS-SCNC: 8 MMOL/L (ref 5–15)
BILIRUB SERPL-MCNC: 0.5 MG/DL (ref 0.2–1)
BUN SERPL-MCNC: 32 MG/DL (ref 9–23)
BUN/CREAT SERPL: 19.3 (ref 10–20)
CALCIUM SERPL-MCNC: 9.9 MG/DL (ref 8.7–10.4)
CHLORIDE SERPL-SCNC: 109 MMOL/L (ref 98–107)
CO2 SERPL-SCNC: 25 MMOL/L (ref 20–31)
GLUCOSE SERPL-MCNC: 82 MG/DL (ref 74–106)
HCT VFR BLD AUTO: 32.7 % (ref 41–53)
HGB BLD-MCNC: 10.6 G/DL (ref 13.5–17.5)
MCH RBC QN AUTO: 30.6 PG (ref 28–32)
MCV RBC AUTO: 94.4 FL (ref 80–100)
NRBC BLD QL AUTO: 0.1 %
POTASSIUM SERPL-SCNC: 4.1 MMOL/L (ref 3.5–5.1)
PROT SERPL-MCNC: 7.2 G/DL (ref 5.7–8.2)
SODIUM SERPL-SCNC: 142 MMOL/L (ref 136–145)

## 2025-04-06 RX ADMIN — VANCOMYCIN ONE MLS/HR: 1.25 INJECTION, SOLUTION INTRAVENOUS at 18:24

## 2025-04-06 RX ADMIN — CLOPIDOGREL BISULFATE SCH MG: 75 TABLET ORAL at 11:55

## 2025-04-06 RX ADMIN — PANTOPRAZOLE SODIUM SCH MG: 40 TABLET, DELAYED RELEASE ORAL at 06:27

## 2025-04-06 RX ADMIN — FUROSEMIDE SCH MG: 10 INJECTION, SOLUTION INTRAMUSCULAR; INTRAVENOUS at 11:54

## 2025-04-06 RX ADMIN — METOPROLOL SUCCINATE SCH MG: 50 TABLET, EXTENDED RELEASE ORAL at 14:52

## 2025-04-06 RX ADMIN — ALBUTEROL SULFATE SCH TAB: 90 AEROSOL, METERED RESPIRATORY (INHALATION) at 10:00

## 2025-04-06 RX ADMIN — MORPHINE SULFATE PRN MG: 2 INJECTION, SOLUTION INTRAMUSCULAR; INTRAVENOUS at 04:46

## 2025-04-06 RX ADMIN — SILVER SULFADIAZINE SCH APPLIC: 10 CREAM TOPICAL at 10:00

## 2025-04-06 RX ADMIN — ATORVASTATIN CALCIUM SCH MG: 20 TABLET, FILM COATED ORAL at 22:11

## 2025-04-06 RX ADMIN — IPRATROPIUM BROMIDE SCH MG: 0.5 SOLUTION RESPIRATORY (INHALATION) at 06:30

## 2025-04-06 RX ADMIN — ISOSORBIDE MONONITRATE SCH MG: 60 TABLET, EXTENDED RELEASE ORAL at 11:58

## 2025-04-06 RX ADMIN — LOSARTAN POTASSIUM SCH MG: 50 TABLET, FILM COATED ORAL at 11:55

## 2025-04-06 RX ADMIN — HYDROCODONE BITARTRATE AND ACETAMINOPHEN ONE TAB: 5; 325 TABLET ORAL at 00:25

## 2025-04-06 RX ADMIN — ALBUTEROL SULFATE SCH MG: 2.5 SOLUTION RESPIRATORY (INHALATION) at 06:30

## 2025-04-06 RX ADMIN — ENOXAPARIN SODIUM SCH MG: 40 INJECTION SUBCUTANEOUS at 11:56

## 2025-04-06 RX ADMIN — Medication SCH MG: at 12:12

## 2025-04-07 VITALS — HEART RATE: 51 BPM | OXYGEN SATURATION: 99 % | RESPIRATION RATE: 17 BRPM

## 2025-04-07 VITALS — HEART RATE: 56 BPM | RESPIRATION RATE: 18 BRPM | OXYGEN SATURATION: 100 %

## 2025-04-07 VITALS
OXYGEN SATURATION: 99 % | RESPIRATION RATE: 17 BRPM | TEMPERATURE: 97.3 F | HEART RATE: 51 BPM | DIASTOLIC BLOOD PRESSURE: 47 MMHG | SYSTOLIC BLOOD PRESSURE: 100 MMHG

## 2025-04-07 VITALS — OXYGEN SATURATION: 100 % | HEART RATE: 53 BPM | RESPIRATION RATE: 18 BRPM

## 2025-04-07 VITALS
RESPIRATION RATE: 20 BRPM | HEART RATE: 55 BPM | OXYGEN SATURATION: 98 % | TEMPERATURE: 97.4 F | DIASTOLIC BLOOD PRESSURE: 40 MMHG | SYSTOLIC BLOOD PRESSURE: 83 MMHG

## 2025-04-07 VITALS
DIASTOLIC BLOOD PRESSURE: 50 MMHG | RESPIRATION RATE: 18 BRPM | OXYGEN SATURATION: 96 % | TEMPERATURE: 98.4 F | SYSTOLIC BLOOD PRESSURE: 101 MMHG | HEART RATE: 59 BPM

## 2025-04-07 VITALS
RESPIRATION RATE: 19 BRPM | DIASTOLIC BLOOD PRESSURE: 54 MMHG | HEART RATE: 54 BPM | TEMPERATURE: 97.4 F | SYSTOLIC BLOOD PRESSURE: 129 MMHG | OXYGEN SATURATION: 96 %

## 2025-04-07 VITALS — OXYGEN SATURATION: 99 %

## 2025-04-07 VITALS
TEMPERATURE: 98.3 F | RESPIRATION RATE: 19 BRPM | OXYGEN SATURATION: 96 % | SYSTOLIC BLOOD PRESSURE: 88 MMHG | HEART RATE: 50 BPM | DIASTOLIC BLOOD PRESSURE: 44 MMHG

## 2025-04-07 VITALS — RESPIRATION RATE: 18 BRPM | OXYGEN SATURATION: 93 % | HEART RATE: 53 BPM

## 2025-04-07 VITALS
DIASTOLIC BLOOD PRESSURE: 39 MMHG | TEMPERATURE: 97.3 F | HEART RATE: 62 BPM | OXYGEN SATURATION: 99 % | RESPIRATION RATE: 18 BRPM | SYSTOLIC BLOOD PRESSURE: 88 MMHG

## 2025-04-07 VITALS — HEART RATE: 54 BPM | OXYGEN SATURATION: 97 % | RESPIRATION RATE: 18 BRPM

## 2025-04-07 VITALS — RESPIRATION RATE: 16 BRPM | OXYGEN SATURATION: 98 % | HEART RATE: 55 BPM

## 2025-04-07 VITALS — HEART RATE: 55 BPM | OXYGEN SATURATION: 98 % | RESPIRATION RATE: 16 BRPM

## 2025-04-07 VITALS — HEART RATE: 50 BPM | OXYGEN SATURATION: 100 % | RESPIRATION RATE: 18 BRPM

## 2025-04-07 VITALS — SYSTOLIC BLOOD PRESSURE: 97 MMHG | HEART RATE: 57 BPM | DIASTOLIC BLOOD PRESSURE: 41 MMHG

## 2025-04-07 VITALS — HEART RATE: 59 BPM | OXYGEN SATURATION: 96 % | RESPIRATION RATE: 18 BRPM

## 2025-04-07 LAB
ALP SERPL-CCNC: 62 U/L (ref 46–116)
ALT SERPL-CCNC: 17 U/L (ref 7–40)
ANION GAP SERPL CALCULATED.3IONS-SCNC: 7 MMOL/L (ref 5–15)
BILIRUB SERPL-MCNC: 0.3 MG/DL (ref 0.2–1)
BUN SERPL-MCNC: 36 MG/DL (ref 9–23)
BUN/CREAT SERPL: 18.5 (ref 10–20)
CALCIUM SERPL-MCNC: 9.8 MG/DL (ref 8.7–10.4)
CHLORIDE SERPL-SCNC: 109 MMOL/L (ref 98–107)
CO2 SERPL-SCNC: 24 MMOL/L (ref 20–31)
GLUCOSE SERPL-MCNC: 145 MG/DL (ref 74–106)
HCT VFR BLD AUTO: 33.6 % (ref 41–53)
HGB BLD-MCNC: 10.7 G/DL (ref 13.5–17.5)
MAGNESIUM SERPL-MCNC: 2.1 MG/DL (ref 1.6–2.6)
MCH RBC QN AUTO: 30.3 PG (ref 28–32)
MCV RBC AUTO: 94.8 FL (ref 80–100)
NRBC BLD QL AUTO: 0.1 %
POTASSIUM SERPL-SCNC: 4.5 MMOL/L (ref 3.5–5.1)
PROT SERPL-MCNC: 7.5 G/DL (ref 5.7–8.2)
SODIUM SERPL-SCNC: 140 MMOL/L (ref 136–145)

## 2025-04-07 RX ADMIN — MORPHINE SULFATE ONE MG: 2 INJECTION, SOLUTION INTRAMUSCULAR; INTRAVENOUS at 06:22

## 2025-04-07 RX ADMIN — VANCOMYCIN SCH MLS/HR: 1.5 INJECTION, SOLUTION INTRAVENOUS at 16:47

## 2025-04-07 RX ADMIN — FUROSEMIDE SCH MG: 10 INJECTION, SOLUTION INTRAMUSCULAR; INTRAVENOUS at 22:27

## 2025-04-08 VITALS
SYSTOLIC BLOOD PRESSURE: 109 MMHG | RESPIRATION RATE: 17 BRPM | DIASTOLIC BLOOD PRESSURE: 43 MMHG | HEART RATE: 60 BPM | OXYGEN SATURATION: 100 % | TEMPERATURE: 98.8 F

## 2025-04-08 VITALS — HEART RATE: 62 BPM | OXYGEN SATURATION: 98 % | RESPIRATION RATE: 18 BRPM

## 2025-04-08 VITALS — OXYGEN SATURATION: 100 % | RESPIRATION RATE: 18 BRPM | HEART RATE: 65 BPM

## 2025-04-08 VITALS — RESPIRATION RATE: 18 BRPM | HEART RATE: 65 BPM | OXYGEN SATURATION: 96 %

## 2025-04-08 VITALS
DIASTOLIC BLOOD PRESSURE: 30 MMHG | SYSTOLIC BLOOD PRESSURE: 77 MMHG | TEMPERATURE: 98 F | OXYGEN SATURATION: 97 % | HEART RATE: 62 BPM | RESPIRATION RATE: 19 BRPM

## 2025-04-08 VITALS
HEART RATE: 63 BPM | OXYGEN SATURATION: 98 % | RESPIRATION RATE: 19 BRPM | SYSTOLIC BLOOD PRESSURE: 97 MMHG | TEMPERATURE: 98.2 F | DIASTOLIC BLOOD PRESSURE: 47 MMHG

## 2025-04-08 VITALS — HEART RATE: 54 BPM | RESPIRATION RATE: 16 BRPM | OXYGEN SATURATION: 95 %

## 2025-04-08 VITALS — HEART RATE: 97 BPM | OXYGEN SATURATION: 97 % | RESPIRATION RATE: 18 BRPM

## 2025-04-08 VITALS — RESPIRATION RATE: 18 BRPM | OXYGEN SATURATION: 100 % | HEART RATE: 60 BPM

## 2025-04-08 VITALS
DIASTOLIC BLOOD PRESSURE: 33 MMHG | OXYGEN SATURATION: 96 % | TEMPERATURE: 98 F | HEART RATE: 53 BPM | SYSTOLIC BLOOD PRESSURE: 103 MMHG | RESPIRATION RATE: 18 BRPM

## 2025-04-08 VITALS
OXYGEN SATURATION: 97 % | RESPIRATION RATE: 18 BRPM | DIASTOLIC BLOOD PRESSURE: 42 MMHG | TEMPERATURE: 97.9 F | HEART RATE: 63 BPM | SYSTOLIC BLOOD PRESSURE: 90 MMHG

## 2025-04-08 VITALS
SYSTOLIC BLOOD PRESSURE: 77 MMHG | HEART RATE: 62 BPM | RESPIRATION RATE: 18 BRPM | OXYGEN SATURATION: 98 % | DIASTOLIC BLOOD PRESSURE: 30 MMHG

## 2025-04-08 VITALS
OXYGEN SATURATION: 97 % | RESPIRATION RATE: 19 BRPM | SYSTOLIC BLOOD PRESSURE: 110 MMHG | TEMPERATURE: 99 F | DIASTOLIC BLOOD PRESSURE: 55 MMHG | HEART RATE: 75 BPM

## 2025-04-08 VITALS
HEART RATE: 54 BPM | TEMPERATURE: 98.4 F | OXYGEN SATURATION: 98 % | SYSTOLIC BLOOD PRESSURE: 144 MMHG | DIASTOLIC BLOOD PRESSURE: 51 MMHG | RESPIRATION RATE: 18 BRPM

## 2025-04-08 VITALS — HEART RATE: 57 BPM | RESPIRATION RATE: 18 BRPM | OXYGEN SATURATION: 100 %

## 2025-04-08 VITALS — OXYGEN SATURATION: 97 %

## 2025-04-08 VITALS — HEART RATE: 60 BPM | OXYGEN SATURATION: 100 % | RESPIRATION RATE: 17 BRPM

## 2025-04-08 LAB
HCT VFR BLD AUTO: 29.1 % (ref 41–53)
HGB BLD-MCNC: 9.6 G/DL (ref 13.5–17.5)
MCH RBC QN AUTO: 30.3 PG (ref 28–32)
MCV RBC AUTO: 92.1 FL (ref 80–100)

## 2025-04-08 RX ADMIN — FUROSEMIDE SCH MG: 10 INJECTION, SOLUTION INTRAMUSCULAR; INTRAVENOUS at 22:43

## 2025-04-09 VITALS
DIASTOLIC BLOOD PRESSURE: 47 MMHG | TEMPERATURE: 98.5 F | OXYGEN SATURATION: 98 % | RESPIRATION RATE: 16 BRPM | SYSTOLIC BLOOD PRESSURE: 100 MMHG | HEART RATE: 67 BPM

## 2025-04-09 VITALS
SYSTOLIC BLOOD PRESSURE: 116 MMHG | RESPIRATION RATE: 20 BRPM | TEMPERATURE: 97.7 F | OXYGEN SATURATION: 100 % | HEART RATE: 65 BPM | DIASTOLIC BLOOD PRESSURE: 51 MMHG

## 2025-04-09 VITALS — OXYGEN SATURATION: 99 % | RESPIRATION RATE: 18 BRPM | HEART RATE: 79 BPM

## 2025-04-09 VITALS — HEART RATE: 59 BPM | OXYGEN SATURATION: 99 % | RESPIRATION RATE: 18 BRPM

## 2025-04-09 VITALS
TEMPERATURE: 97.6 F | HEART RATE: 66 BPM | RESPIRATION RATE: 20 BRPM | DIASTOLIC BLOOD PRESSURE: 49 MMHG | SYSTOLIC BLOOD PRESSURE: 110 MMHG | OXYGEN SATURATION: 99 %

## 2025-04-09 VITALS — OXYGEN SATURATION: 99 % | HEART RATE: 62 BPM | RESPIRATION RATE: 18 BRPM

## 2025-04-09 VITALS — HEART RATE: 51 BPM | RESPIRATION RATE: 18 BRPM | OXYGEN SATURATION: 100 %

## 2025-04-09 VITALS
RESPIRATION RATE: 17 BRPM | SYSTOLIC BLOOD PRESSURE: 117 MMHG | HEART RATE: 61 BPM | OXYGEN SATURATION: 99 % | TEMPERATURE: 98.4 F | DIASTOLIC BLOOD PRESSURE: 48 MMHG

## 2025-04-09 VITALS
RESPIRATION RATE: 21 BRPM | SYSTOLIC BLOOD PRESSURE: 92 MMHG | TEMPERATURE: 98 F | DIASTOLIC BLOOD PRESSURE: 48 MMHG | OXYGEN SATURATION: 98 % | HEART RATE: 56 BPM

## 2025-04-09 VITALS
OXYGEN SATURATION: 97 % | SYSTOLIC BLOOD PRESSURE: 95 MMHG | HEART RATE: 55 BPM | RESPIRATION RATE: 17 BRPM | DIASTOLIC BLOOD PRESSURE: 44 MMHG | TEMPERATURE: 98.4 F

## 2025-04-09 VITALS — OXYGEN SATURATION: 97 % | HEART RATE: 59 BPM

## 2025-04-09 VITALS — OXYGEN SATURATION: 99 % | HEART RATE: 63 BPM | RESPIRATION RATE: 18 BRPM

## 2025-04-09 VITALS — OXYGEN SATURATION: 98 % | HEART RATE: 96 BPM | RESPIRATION RATE: 20 BRPM

## 2025-04-09 VITALS — OXYGEN SATURATION: 96 %

## 2025-04-09 LAB
ANION GAP SERPL CALCULATED.3IONS-SCNC: 9 MMOL/L (ref 5–15)
BUN SERPL-MCNC: 45 MG/DL (ref 9–23)
CALCIUM SERPL-MCNC: 9.6 MG/DL (ref 8.7–10.4)
CHLORIDE SERPL-SCNC: 107 MMOL/L (ref 98–107)
CO2 SERPL-SCNC: 25 MMOL/L (ref 20–31)
GLUCOSE SERPL-MCNC: 127 MG/DL (ref 74–106)
HGB BLD-MCNC: 10.6 G/DL (ref 13.5–17.5)
MAGNESIUM SERPL-MCNC: 2.2 MG/DL (ref 1.6–2.6)
MCH RBC QN AUTO: 30.7 PG (ref 28–32)
MCV RBC AUTO: 92.6 FL (ref 80–100)
POTASSIUM SERPL-SCNC: 3.9 MMOL/L (ref 3.5–5.1)
SODIUM SERPL-SCNC: 141 MMOL/L (ref 136–145)

## 2025-04-10 VITALS — HEART RATE: 51 BPM | OXYGEN SATURATION: 100 % | RESPIRATION RATE: 18 BRPM

## 2025-04-10 VITALS
DIASTOLIC BLOOD PRESSURE: 53 MMHG | RESPIRATION RATE: 20 BRPM | TEMPERATURE: 98.3 F | HEART RATE: 71 BPM | SYSTOLIC BLOOD PRESSURE: 110 MMHG | OXYGEN SATURATION: 100 %

## 2025-04-10 VITALS — HEART RATE: 60 BPM | RESPIRATION RATE: 18 BRPM | OXYGEN SATURATION: 100 %

## 2025-04-10 VITALS
SYSTOLIC BLOOD PRESSURE: 112 MMHG | RESPIRATION RATE: 20 BRPM | DIASTOLIC BLOOD PRESSURE: 55 MMHG | HEART RATE: 61 BPM | OXYGEN SATURATION: 100 % | TEMPERATURE: 98.2 F

## 2025-04-10 VITALS
OXYGEN SATURATION: 99 % | SYSTOLIC BLOOD PRESSURE: 113 MMHG | HEART RATE: 96 BPM | DIASTOLIC BLOOD PRESSURE: 52 MMHG | RESPIRATION RATE: 20 BRPM | TEMPERATURE: 98.3 F

## 2025-04-10 VITALS
TEMPERATURE: 98.3 F | DIASTOLIC BLOOD PRESSURE: 73 MMHG | SYSTOLIC BLOOD PRESSURE: 119 MMHG | RESPIRATION RATE: 18 BRPM | OXYGEN SATURATION: 100 % | HEART RATE: 73 BPM

## 2025-04-10 VITALS — OXYGEN SATURATION: 100 % | HEART RATE: 64 BPM | RESPIRATION RATE: 18 BRPM

## 2025-04-10 VITALS
TEMPERATURE: 97.9 F | OXYGEN SATURATION: 99 % | DIASTOLIC BLOOD PRESSURE: 54 MMHG | HEART RATE: 53 BPM | SYSTOLIC BLOOD PRESSURE: 106 MMHG | RESPIRATION RATE: 18 BRPM

## 2025-04-10 VITALS — OXYGEN SATURATION: 100 % | HEART RATE: 52 BPM | RESPIRATION RATE: 18 BRPM

## 2025-04-10 VITALS — OXYGEN SATURATION: 97 % | HEART RATE: 62 BPM

## 2025-04-10 VITALS — OXYGEN SATURATION: 100 %

## 2025-04-10 LAB
HCT VFR BLD AUTO: 30.6 % (ref 41–53)
MCH RBC QN AUTO: 30.4 PG (ref 28–32)
MCV RBC AUTO: 92.8 FL (ref 80–100)

## 2025-04-10 RX ADMIN — DEXTROSE SCH MG: 50 INJECTION, SOLUTION INTRAVENOUS at 10:01

## 2025-04-11 ENCOUNTER — HOSPITAL ENCOUNTER (EMERGENCY)
Dept: HOSPITAL 15 - ER | Age: 70
Discharge: HOME | End: 2025-04-11
Payer: COMMERCIAL

## 2025-04-11 VITALS — OXYGEN SATURATION: 99 % | HEART RATE: 77 BPM | RESPIRATION RATE: 18 BRPM

## 2025-04-11 VITALS — HEART RATE: 67 BPM | OXYGEN SATURATION: 97 % | RESPIRATION RATE: 15 BRPM

## 2025-04-11 VITALS — BODY MASS INDEX: 42.43 KG/M2 | WEIGHT: 279.99 LBS | HEIGHT: 68 IN

## 2025-04-11 VITALS
SYSTOLIC BLOOD PRESSURE: 109 MMHG | TEMPERATURE: 98.2 F | HEART RATE: 77 BPM | OXYGEN SATURATION: 99 % | DIASTOLIC BLOOD PRESSURE: 62 MMHG | RESPIRATION RATE: 18 BRPM

## 2025-04-11 DIAGNOSIS — Z79.82: ICD-10-CM

## 2025-04-11 DIAGNOSIS — E66.01: ICD-10-CM

## 2025-04-11 DIAGNOSIS — F32.A: ICD-10-CM

## 2025-04-11 DIAGNOSIS — Z86.73: ICD-10-CM

## 2025-04-11 DIAGNOSIS — J44.9: ICD-10-CM

## 2025-04-11 DIAGNOSIS — Z87.440: ICD-10-CM

## 2025-04-11 DIAGNOSIS — Z90.89: ICD-10-CM

## 2025-04-11 DIAGNOSIS — Z79.899: ICD-10-CM

## 2025-04-11 DIAGNOSIS — I50.9: ICD-10-CM

## 2025-04-11 DIAGNOSIS — Z79.84: ICD-10-CM

## 2025-04-11 DIAGNOSIS — F41.9: ICD-10-CM

## 2025-04-11 DIAGNOSIS — R06.02: ICD-10-CM

## 2025-04-11 DIAGNOSIS — E11.9: ICD-10-CM

## 2025-04-11 DIAGNOSIS — Z90.49: ICD-10-CM

## 2025-04-11 DIAGNOSIS — R07.89: Primary | ICD-10-CM

## 2025-04-11 DIAGNOSIS — M79.602: ICD-10-CM

## 2025-04-11 DIAGNOSIS — I11.0: ICD-10-CM

## 2025-04-11 LAB
ALBUMIN SERPL-MCNC: 3.9 G/DL (ref 3.2–4.8)
ALP SERPL-CCNC: 65 U/L (ref 46–116)
ALT SERPL-CCNC: 19 U/L (ref 7–40)
ANION GAP SERPL CALCULATED.3IONS-SCNC: 7 MMOL/L (ref 5–15)
BILIRUB SERPL-MCNC: 0.2 MG/DL (ref 0.2–1)
BUN SERPL-MCNC: 47 MG/DL (ref 9–23)
BUN/CREAT SERPL: 22.2 (ref 10–20)
CALCIUM SERPL-MCNC: 9.7 MG/DL (ref 8.7–10.4)
CHLORIDE SERPL-SCNC: 106 MMOL/L (ref 98–107)
CO2 SERPL-SCNC: 25 MMOL/L (ref 20–31)
GLUCOSE SERPL-MCNC: 110 MG/DL (ref 74–106)
HCT VFR BLD AUTO: 34.4 % (ref 41–53)
HGB BLD-MCNC: 11.4 G/DL (ref 13.5–17.5)
MCH RBC QN AUTO: 30.5 PG (ref 28–32)
MCV RBC AUTO: 92.4 FL (ref 80–100)
NRBC BLD QL AUTO: 0.1 %
POTASSIUM SERPL-SCNC: 3.7 MMOL/L (ref 3.5–5.1)
PROT SERPL-MCNC: 7.5 G/DL (ref 5.7–8.2)
SODIUM SERPL-SCNC: 138 MMOL/L (ref 136–145)

## 2025-04-11 PROCEDURE — 71045 X-RAY EXAM CHEST 1 VIEW: CPT

## 2025-04-11 PROCEDURE — 80053 COMPREHEN METABOLIC PANEL: CPT

## 2025-04-11 PROCEDURE — 85025 COMPLETE CBC W/AUTO DIFF WBC: CPT

## 2025-04-11 PROCEDURE — 84484 ASSAY OF TROPONIN QUANT: CPT

## 2025-04-11 PROCEDURE — 83880 ASSAY OF NATRIURETIC PEPTIDE: CPT

## 2025-04-11 PROCEDURE — 93005 ELECTROCARDIOGRAM TRACING: CPT

## 2025-04-11 PROCEDURE — 36415 COLL VENOUS BLD VENIPUNCTURE: CPT

## 2025-04-11 RX ADMIN — ACETAMINOPHEN ONE MG: 325 TABLET ORAL at 05:18

## 2025-05-13 ENCOUNTER — HOSPITAL ENCOUNTER (EMERGENCY)
Dept: HOSPITAL 15 - ER | Age: 70
Discharge: HOME | End: 2025-05-13
Payer: COMMERCIAL

## 2025-05-13 VITALS
OXYGEN SATURATION: 96 % | SYSTOLIC BLOOD PRESSURE: 157 MMHG | RESPIRATION RATE: 18 BRPM | HEART RATE: 58 BPM | TEMPERATURE: 98.3 F | DIASTOLIC BLOOD PRESSURE: 102 MMHG

## 2025-05-13 DIAGNOSIS — R07.89: Primary | ICD-10-CM

## 2025-05-13 DIAGNOSIS — E11.9: ICD-10-CM

## 2025-05-13 DIAGNOSIS — I25.10: ICD-10-CM

## 2025-05-13 DIAGNOSIS — Z90.49: ICD-10-CM

## 2025-05-13 DIAGNOSIS — F41.9: ICD-10-CM

## 2025-05-13 DIAGNOSIS — Z90.89: ICD-10-CM

## 2025-05-13 DIAGNOSIS — Z79.82: ICD-10-CM

## 2025-05-13 DIAGNOSIS — Z79.899: ICD-10-CM

## 2025-05-13 DIAGNOSIS — Z86.73: ICD-10-CM

## 2025-05-13 DIAGNOSIS — I11.0: ICD-10-CM

## 2025-05-13 DIAGNOSIS — F32.A: ICD-10-CM

## 2025-05-13 DIAGNOSIS — I50.9: ICD-10-CM

## 2025-05-13 LAB
ANION GAP SERPL CALCULATED.3IONS-SCNC: 7 MMOL/L (ref 5–15)
BUN SERPL-MCNC: 20 MG/DL (ref 9–23)
BUN/CREAT SERPL: 13.8 (ref 10–20)
CALCIUM SERPL-MCNC: 9.8 MG/DL (ref 8.7–10.4)
CHLORIDE SERPL-SCNC: 110 MMOL/L (ref 98–107)
CO2 SERPL-SCNC: 29 MMOL/L (ref 20–31)
GLUCOSE SERPL-MCNC: 93 MG/DL (ref 74–106)
HCT VFR BLD AUTO: 34.4 % (ref 41–53)
HGB BLD-MCNC: 11.1 G/DL (ref 13.5–17.5)
MCH RBC QN AUTO: 29.2 PG (ref 28–32)
MCV RBC AUTO: 90.7 FL (ref 80–100)
NRBC BLD QL AUTO: 0.1 %
POTASSIUM SERPL-SCNC: 4.2 MMOL/L (ref 3.5–5.1)
SODIUM SERPL-SCNC: 146 MMOL/L (ref 136–145)

## 2025-05-13 PROCEDURE — 85025 COMPLETE CBC W/AUTO DIFF WBC: CPT

## 2025-05-13 PROCEDURE — 36415 COLL VENOUS BLD VENIPUNCTURE: CPT

## 2025-05-13 PROCEDURE — 93005 ELECTROCARDIOGRAM TRACING: CPT

## 2025-05-13 PROCEDURE — 84484 ASSAY OF TROPONIN QUANT: CPT

## 2025-05-13 PROCEDURE — 80048 BASIC METABOLIC PNL TOTAL CA: CPT

## 2025-07-10 ENCOUNTER — HOSPITAL ENCOUNTER (INPATIENT)
Dept: HOSPITAL 15 - ER | Age: 70
LOS: 4 days | Discharge: HOME | DRG: 291 | End: 2025-07-14
Attending: FAMILY MEDICINE | Admitting: FAMILY MEDICINE
Payer: COMMERCIAL

## 2025-07-10 VITALS — WEIGHT: 222.67 LBS | BODY MASS INDEX: 37.1 KG/M2 | HEIGHT: 65 IN

## 2025-07-10 VITALS — OXYGEN SATURATION: 94 %

## 2025-07-10 DIAGNOSIS — Z95.0: ICD-10-CM

## 2025-07-10 DIAGNOSIS — Z82.5: ICD-10-CM

## 2025-07-10 DIAGNOSIS — I25.10: ICD-10-CM

## 2025-07-10 DIAGNOSIS — Z81.8: ICD-10-CM

## 2025-07-10 DIAGNOSIS — Z79.899: ICD-10-CM

## 2025-07-10 DIAGNOSIS — Z90.49: ICD-10-CM

## 2025-07-10 DIAGNOSIS — I50.33: ICD-10-CM

## 2025-07-10 DIAGNOSIS — J44.1: ICD-10-CM

## 2025-07-10 DIAGNOSIS — K21.9: ICD-10-CM

## 2025-07-10 DIAGNOSIS — Z82.49: ICD-10-CM

## 2025-07-10 DIAGNOSIS — J96.21: ICD-10-CM

## 2025-07-10 DIAGNOSIS — E66.01: ICD-10-CM

## 2025-07-10 DIAGNOSIS — I13.0: Primary | ICD-10-CM

## 2025-07-10 DIAGNOSIS — I48.91: ICD-10-CM

## 2025-07-10 DIAGNOSIS — N18.2: ICD-10-CM

## 2025-07-10 DIAGNOSIS — Z82.3: ICD-10-CM

## 2025-07-10 DIAGNOSIS — Z79.84: ICD-10-CM

## 2025-07-10 DIAGNOSIS — F41.9: ICD-10-CM

## 2025-07-10 DIAGNOSIS — Z95.5: ICD-10-CM

## 2025-07-10 DIAGNOSIS — Z99.81: ICD-10-CM

## 2025-07-10 DIAGNOSIS — G89.4: ICD-10-CM

## 2025-07-10 DIAGNOSIS — E11.22: ICD-10-CM

## 2025-07-10 DIAGNOSIS — E87.6: ICD-10-CM

## 2025-07-10 DIAGNOSIS — Z86.73: ICD-10-CM

## 2025-07-10 DIAGNOSIS — Z83.3: ICD-10-CM

## 2025-07-10 LAB
ALBUMIN SERPL-MCNC: 3.4 G/DL (ref 3.2–4.8)
ALP SERPL-CCNC: 58 U/L (ref 46–116)
ALT SERPL-CCNC: 11 U/L (ref 7–40)
ANION GAP SERPL CALCULATED.3IONS-SCNC: 10 MMOL/L (ref 5–15)
AST SERPL-CCNC: 17 U/L (ref 13–40)
BASE EXCESS BLDV CALC-SCNC: 0.4 MMOL/L (ref -2–3)
BASOPHILS # BLD AUTO: 0 10 ^3/UL (ref 0–0.2)
BASOPHILS NFR BLD AUTO: 0.5 % (ref 0–2)
BILIRUB SERPL-MCNC: 0.3 MG/DL (ref 0.2–1)
BUN SERPL-MCNC: 12 MG/DL (ref 9–23)
BUN/CREAT SERPL: 11.8 (ref 10–20)
CALCIUM SERPL-MCNC: 9.2 MG/DL (ref 8.7–10.4)
CHLORIDE SERPL-SCNC: 105 MMOL/L (ref 98–107)
CO2 SERPL-SCNC: 24 MMOL/L (ref 20–31)
CREAT SERPL-MCNC: 1.02 MG/DL (ref 0.7–1.3)
EOSINOPHIL # BLD AUTO: 0.3 10 ^3/UL (ref 0–0.8)
EOSINOPHIL NFR BLD AUTO: 3.2 % (ref 0–7)
ERYTHROCYTE [DISTWIDTH] IN BLOOD BY AUTOMATED COUNT: 16 % (ref 11.8–14.3)
GLUCOSE SERPL-MCNC: 98 MG/DL (ref 74–106)
HCT VFR BLD AUTO: 32.6 % (ref 41–53)
HGB BLD-MCNC: 10.4 G/DL (ref 13.5–17.5)
LACTATE PLASV-SCNC: 2.3 MMOL/L (ref 0.4–2)
LYMPHOCYTES # BLD AUTO: 2.2 10 ^3/UL (ref 0.4–5.4)
LYMPHOCYTES NFR BLD AUTO: 27.8 % (ref 10–50)
MCH RBC QN AUTO: 28.4 PG (ref 28–32)
MCHC RBC AUTO-ENTMCNC: 32 G/DL (ref 32–36)
MCV RBC AUTO: 88.6 FL (ref 80–100)
MONOCYTES # BLD AUTO: 0.6 10 ^3/UL (ref 0–1.3)
MONOCYTES NFR BLD AUTO: 7.3 % (ref 0–12)
NEUTROPHILS # BLD AUTO: 4.9 10 ^3/UL (ref 1.6–8.6)
NEUTROPHILS NFR BLD AUTO: 61.2 % (ref 37–80)
NRBC BLD QL AUTO: 0 %
PLATELET # BLD AUTO: 237 10^3/UL (ref 140–450)
PROT SERPL-MCNC: 7.1 G/DL (ref 5.7–8.2)
RBC # BLD AUTO: 3.68 10^6/UL (ref 4.5–5.9)
SODIUM SERPL-SCNC: 139 MMOL/L (ref 136–145)
WBC # BLD AUTO: 7.9 10^3/UL (ref 4.4–10.8)

## 2025-07-10 PROCEDURE — 71045 X-RAY EXAM CHEST 1 VIEW: CPT

## 2025-07-10 PROCEDURE — 82805 BLOOD GASES W/O2 SATURATION: CPT

## 2025-07-10 PROCEDURE — 36600 WITHDRAWAL OF ARTERIAL BLOOD: CPT

## 2025-07-10 PROCEDURE — 81001 URINALYSIS AUTO W/SCOPE: CPT

## 2025-07-10 PROCEDURE — 87081 CULTURE SCREEN ONLY: CPT

## 2025-07-10 PROCEDURE — 93005 ELECTROCARDIOGRAM TRACING: CPT

## 2025-07-10 PROCEDURE — 94640 AIRWAY INHALATION TREATMENT: CPT

## 2025-07-10 PROCEDURE — 85025 COMPLETE CBC W/AUTO DIFF WBC: CPT

## 2025-07-10 PROCEDURE — 87426 SARSCOV CORONAVIRUS AG IA: CPT

## 2025-07-10 PROCEDURE — 87804 INFLUENZA ASSAY W/OPTIC: CPT

## 2025-07-10 PROCEDURE — 96361 HYDRATE IV INFUSION ADD-ON: CPT

## 2025-07-10 PROCEDURE — 84484 ASSAY OF TROPONIN QUANT: CPT

## 2025-07-10 PROCEDURE — 96374 THER/PROPH/DIAG INJ IV PUSH: CPT

## 2025-07-10 PROCEDURE — 36415 COLL VENOUS BLD VENIPUNCTURE: CPT

## 2025-07-10 PROCEDURE — 85379 FIBRIN DEGRADATION QUANT: CPT

## 2025-07-10 PROCEDURE — 83605 ASSAY OF LACTIC ACID: CPT

## 2025-07-10 PROCEDURE — 83880 ASSAY OF NATRIURETIC PEPTIDE: CPT

## 2025-07-10 PROCEDURE — 71275 CT ANGIOGRAPHY CHEST: CPT

## 2025-07-10 PROCEDURE — 80053 COMPREHEN METABOLIC PANEL: CPT

## 2025-07-11 VITALS
RESPIRATION RATE: 18 BRPM | DIASTOLIC BLOOD PRESSURE: 76 MMHG | HEART RATE: 88 BPM | TEMPERATURE: 98 F | OXYGEN SATURATION: 96 % | SYSTOLIC BLOOD PRESSURE: 136 MMHG

## 2025-07-11 VITALS — RESPIRATION RATE: 20 BRPM | OXYGEN SATURATION: 95 % | HEART RATE: 75 BPM

## 2025-07-11 VITALS
DIASTOLIC BLOOD PRESSURE: 72 MMHG | TEMPERATURE: 97.1 F | HEART RATE: 60 BPM | OXYGEN SATURATION: 98 % | SYSTOLIC BLOOD PRESSURE: 145 MMHG | RESPIRATION RATE: 18 BRPM

## 2025-07-11 VITALS — OXYGEN SATURATION: 94 %

## 2025-07-11 VITALS — HEART RATE: 83 BPM | RESPIRATION RATE: 14 BRPM | OXYGEN SATURATION: 95 %

## 2025-07-11 VITALS — OXYGEN SATURATION: 96 %

## 2025-07-11 LAB
ALBUMIN SERPL-MCNC: 3.5 G/DL (ref 3.2–4.8)
ALP SERPL-CCNC: 56 U/L (ref 46–116)
ALT SERPL-CCNC: 12 U/L (ref 7–40)
ANION GAP SERPL CALCULATED.3IONS-SCNC: 8 MMOL/L (ref 5–15)
AST SERPL-CCNC: 25 U/L (ref 13–40)
BACTERIA UR QL AUTO: (no result) /HPF
BASOPHILS # BLD AUTO: 0.1 10 ^3/UL (ref 0–0.2)
BASOPHILS NFR BLD AUTO: 1 % (ref 0–2)
BILIRUB SERPL-MCNC: 0.4 MG/DL (ref 0.2–1)
BUN SERPL-MCNC: 13 MG/DL (ref 9–23)
BUN/CREAT SERPL: 13.4 (ref 10–20)
CALCIUM SERPL-MCNC: 9.6 MG/DL (ref 8.7–10.4)
CASTS URNS QL MICRO: (no result) /LPF
CELLULAR CAST: (no result) /HPF
CHLORIDE SERPL-SCNC: 105 MMOL/L (ref 98–107)
CLARITY UR: CLEAR
CO2 SERPL-SCNC: 28 MMOL/L (ref 20–31)
COARSE GRAN CASTS URNS QL MICRO: (no result) /LPF
COLOR UR: YELLOW
CREAT SERPL-MCNC: 0.97 MG/DL (ref 0.7–1.3)
CRYSTALS UR QL AUTO: (no result) /HPF
CRYSTALS URNS MICRO: (no result) /HPF
EOSINOPHIL # BLD AUTO: 0.3 10 ^3/UL (ref 0–0.8)
EOSINOPHIL NFR BLD AUTO: 3.8 % (ref 0–7)
ERYTHROCYTE [DISTWIDTH] IN BLOOD BY AUTOMATED COUNT: 16.6 % (ref 11.8–14.3)
FINE GRAN CASTS #/AREA URNS AUTO: (no result) /LPF
FLUAV AG UPPER RESP QL IA.RAPID: NEGATIVE
FLUBV AG SPEC QL: NEGATIVE
GLUCOSE SERPL-MCNC: 84 MG/DL (ref 74–106)
HCT VFR BLD AUTO: 35.3 % (ref 41–53)
HGB BLD-MCNC: 11.3 G/DL (ref 13.5–17.5)
HGB UR QL STRIP: NEGATIVE /UL
HYALINE CASTS UR QL AUTO: (no result) /LPF (ref 0–2)
LEUCINE CRYSTALS [PRESENCE] IN URINE BY COMPUTER ASSISTED METHOD: (no result) /HPF
LYMPHOCYTES # BLD AUTO: 2 10 ^3/UL (ref 0.4–5.4)
LYMPHOCYTES NFR BLD AUTO: 25.4 % (ref 10–50)
MCH RBC QN AUTO: 28.2 PG (ref 28–32)
MCHC RBC AUTO-ENTMCNC: 31.9 G/DL (ref 32–36)
MCV RBC AUTO: 88.3 FL (ref 80–100)
MONOCYTES # BLD AUTO: 0.7 10 ^3/UL (ref 0–1.3)
MONOCYTES NFR BLD AUTO: 8.7 % (ref 0–12)
MUCOUS THREADS UR QL AUTO: (no result)
MUCOUS THREADS UR QL AUTO: (no result) /HPF (ref ?–5)
NEUTROPHILS # BLD AUTO: 4.9 10 ^3/UL (ref 1.6–8.6)
NEUTROPHILS NFR BLD AUTO: 61.1 % (ref 37–80)
NRBC BLD QL AUTO: 0.2 %
PH UR: 5 [PH] (ref 5–9)
PLATELET # BLD AUTO: 178 10^3/UL (ref 140–450)
POTASSIUM SERPL-SCNC: 3 MMOL/L (ref 3.5–5.1)
POTASSIUM SERPL-SCNC: 3.4 MMOL/L (ref 3.5–5.1)
PROT SERPL-MCNC: 7.2 G/DL (ref 5.7–8.2)
PROT UR STRIP.AUTO-MCNC: NEGATIVE MG/DL
RBC # BLD AUTO: 4 10^6/UL (ref 4.5–5.9)
SARS-COV+SARS-COV-2 AG RESP QL IA.RAPID: NEGATIVE
SODIUM SERPL-SCNC: 141 MMOL/L (ref 136–145)
SP GR UR STRIP: 1.01 (ref 1–1.03)
SPERM UR QL AUTO: (no result) /HPF
STARCH GRANULES URNS QL MICRO: (no result)
URINE SQUAMOUS EPITHELIAL CELL: (no result) /HPF (ref ?–5)
UROBILINOGEN UR QL: NORMAL MG/DL
WBC # BLD AUTO: 8 10^3/UL (ref 4.4–10.8)
WBC #/AREA URNS AUTO: 1 /HPF (ref 0–3)
WBC CLUMPS UR QL AUTO: (no result) /HPF
YEAST # UR AUTO: (no result) /HPF
YEAST HYPHAE UR QL COMP ASSIST: (no result) /HPF

## 2025-07-11 RX ADMIN — MORPHINE SULFATE ONE MG: 2 INJECTION, SOLUTION INTRAMUSCULAR; INTRAVENOUS at 02:28

## 2025-07-11 RX ADMIN — TRAZODONE HYDROCHLORIDE SCH MG: 50 TABLET ORAL at 21:39

## 2025-07-11 RX ADMIN — ASPIRIN ONE MG: 325 TABLET, FILM COATED ORAL at 01:00

## 2025-07-11 RX ADMIN — IOHEXOL ONE MG: 350 INJECTION, SOLUTION INTRAVENOUS at 05:24

## 2025-07-11 RX ADMIN — MORPHINE SULFATE ONE MG: 2 INJECTION, SOLUTION INTRAMUSCULAR; INTRAVENOUS at 04:05

## 2025-07-11 RX ADMIN — LEVETIRACETAM SCH MLS/HR: 10 INJECTION INTRAVENOUS at 09:48

## 2025-07-11 RX ADMIN — MORPHINE SULFATE PRN MG: 2 INJECTION, SOLUTION INTRAMUSCULAR; INTRAVENOUS at 19:49

## 2025-07-11 RX ADMIN — MUPIROCIN SCH APPLIC: 20 OINTMENT TOPICAL at 20:42

## 2025-07-11 RX ADMIN — POTASSIUM CHLORIDE SCH MLS/HR: 200 INJECTION, SOLUTION INTRAVENOUS at 05:29

## 2025-07-11 RX ADMIN — PANTOPRAZOLE SODIUM SCH MG: 40 INJECTION, POWDER, FOR SOLUTION INTRAVENOUS at 09:48

## 2025-07-11 RX ADMIN — SODIUM CHLORIDE ONE MLS/HR: 0.9 INJECTION, SOLUTION INTRAVENOUS at 02:32

## 2025-07-11 RX ADMIN — ASPIRIN SCH MG: 81 TABLET, COATED ORAL at 09:48

## 2025-07-11 RX ADMIN — ATORVASTATIN CALCIUM SCH MG: 20 TABLET, FILM COATED ORAL at 20:42

## 2025-07-11 RX ADMIN — SODIUM CHLORIDE SCH ML: 9 INJECTION INTRAMUSCULAR; INTRAVENOUS; SUBCUTANEOUS at 06:06

## 2025-07-11 RX ADMIN — OXYCODONE HYDROCHLORIDE AND ACETAMINOPHEN PRN TAB: 5; 325 TABLET ORAL at 06:08

## 2025-07-12 VITALS
DIASTOLIC BLOOD PRESSURE: 67 MMHG | OXYGEN SATURATION: 96 % | HEART RATE: 56 BPM | SYSTOLIC BLOOD PRESSURE: 133 MMHG | TEMPERATURE: 98.2 F | RESPIRATION RATE: 16 BRPM

## 2025-07-12 VITALS
TEMPERATURE: 97 F | HEART RATE: 58 BPM | RESPIRATION RATE: 18 BRPM | OXYGEN SATURATION: 93 % | DIASTOLIC BLOOD PRESSURE: 79 MMHG | SYSTOLIC BLOOD PRESSURE: 137 MMHG

## 2025-07-12 VITALS
DIASTOLIC BLOOD PRESSURE: 76 MMHG | SYSTOLIC BLOOD PRESSURE: 136 MMHG | TEMPERATURE: 97.9 F | HEART RATE: 61 BPM | OXYGEN SATURATION: 97 % | RESPIRATION RATE: 20 BRPM

## 2025-07-12 VITALS — OXYGEN SATURATION: 97 %

## 2025-07-12 VITALS
DIASTOLIC BLOOD PRESSURE: 51 MMHG | RESPIRATION RATE: 18 BRPM | HEART RATE: 58 BPM | OXYGEN SATURATION: 97 % | TEMPERATURE: 96.9 F | SYSTOLIC BLOOD PRESSURE: 135 MMHG

## 2025-07-12 VITALS — RESPIRATION RATE: 18 BRPM | OXYGEN SATURATION: 98 % | HEART RATE: 57 BPM

## 2025-07-12 VITALS — OXYGEN SATURATION: 95 %

## 2025-07-12 VITALS
SYSTOLIC BLOOD PRESSURE: 140 MMHG | DIASTOLIC BLOOD PRESSURE: 65 MMHG | HEART RATE: 62 BPM | RESPIRATION RATE: 16 BRPM | TEMPERATURE: 97.8 F | OXYGEN SATURATION: 96 %

## 2025-07-12 VITALS
OXYGEN SATURATION: 93 % | HEART RATE: 56 BPM | TEMPERATURE: 96.6 F | DIASTOLIC BLOOD PRESSURE: 73 MMHG | RESPIRATION RATE: 17 BRPM | SYSTOLIC BLOOD PRESSURE: 143 MMHG

## 2025-07-12 VITALS — OXYGEN SATURATION: 93 %

## 2025-07-12 LAB
ALBUMIN SERPL-MCNC: 3.4 G/DL (ref 3.2–4.8)
ALP SERPL-CCNC: 58 U/L (ref 46–116)
ALT SERPL-CCNC: 10 U/L (ref 7–40)
ANION GAP SERPL CALCULATED.3IONS-SCNC: 6 MMOL/L (ref 5–15)
AST SERPL-CCNC: 15 U/L (ref 13–40)
BASOPHILS # BLD AUTO: 0.1 10 ^3/UL (ref 0–0.2)
BASOPHILS NFR BLD AUTO: 1 % (ref 0–2)
BILIRUB SERPL-MCNC: 0.4 MG/DL (ref 0.2–1)
BUN SERPL-MCNC: 14 MG/DL (ref 9–23)
BUN/CREAT SERPL: 13.9 (ref 10–20)
CALCIUM SERPL-MCNC: 9.2 MG/DL (ref 8.7–10.4)
CHLORIDE SERPL-SCNC: 108 MMOL/L (ref 98–107)
CO2 SERPL-SCNC: 30 MMOL/L (ref 20–31)
CREAT SERPL-MCNC: 1.01 MG/DL (ref 0.7–1.3)
EOSINOPHIL # BLD AUTO: 0.5 10 ^3/UL (ref 0–0.8)
EOSINOPHIL NFR BLD AUTO: 6.1 % (ref 0–7)
ERYTHROCYTE [DISTWIDTH] IN BLOOD BY AUTOMATED COUNT: 16.9 % (ref 11.8–14.3)
GLUCOSE SERPL-MCNC: 102 MG/DL (ref 74–106)
HCT VFR BLD AUTO: 31.6 % (ref 41–53)
HGB BLD-MCNC: 10.3 G/DL (ref 13.5–17.5)
LYMPHOCYTES # BLD AUTO: 1.6 10 ^3/UL (ref 0.4–5.4)
LYMPHOCYTES NFR BLD AUTO: 20.9 % (ref 10–50)
MCH RBC QN AUTO: 28.4 PG (ref 28–32)
MCHC RBC AUTO-ENTMCNC: 32.5 G/DL (ref 32–36)
MCV RBC AUTO: 87.5 FL (ref 80–100)
MONOCYTES # BLD AUTO: 0.7 10 ^3/UL (ref 0–1.3)
MONOCYTES NFR BLD AUTO: 8.6 % (ref 0–12)
NEUTROPHILS # BLD AUTO: 4.8 10 ^3/UL (ref 1.6–8.6)
NEUTROPHILS NFR BLD AUTO: 63.4 % (ref 37–80)
NRBC BLD QL AUTO: 0.1 %
PLATELET # BLD AUTO: 212 10^3/UL (ref 140–450)
POTASSIUM SERPL-SCNC: 3.8 MMOL/L (ref 3.5–5.1)
PROT SERPL-MCNC: 6.9 G/DL (ref 5.7–8.2)
RBC # BLD AUTO: 3.61 10^6/UL (ref 4.5–5.9)
SODIUM SERPL-SCNC: 144 MMOL/L (ref 136–145)
WBC # BLD AUTO: 7.6 10^3/UL (ref 4.4–10.8)

## 2025-07-12 RX ADMIN — OXYCODONE HYDROCHLORIDE AND ACETAMINOPHEN PRN TAB: 5; 325 TABLET ORAL at 12:05

## 2025-07-13 VITALS
SYSTOLIC BLOOD PRESSURE: 134 MMHG | DIASTOLIC BLOOD PRESSURE: 69 MMHG | HEART RATE: 56 BPM | TEMPERATURE: 97.2 F | RESPIRATION RATE: 16 BRPM | OXYGEN SATURATION: 96 %

## 2025-07-13 VITALS — OXYGEN SATURATION: 92 % | HEART RATE: 56 BPM | RESPIRATION RATE: 17 BRPM

## 2025-07-13 VITALS — OXYGEN SATURATION: 97 % | HEART RATE: 54 BPM | RESPIRATION RATE: 18 BRPM

## 2025-07-13 VITALS
OXYGEN SATURATION: 92 % | RESPIRATION RATE: 17 BRPM | HEART RATE: 60 BPM | DIASTOLIC BLOOD PRESSURE: 63 MMHG | SYSTOLIC BLOOD PRESSURE: 132 MMHG | TEMPERATURE: 97.5 F

## 2025-07-13 VITALS
DIASTOLIC BLOOD PRESSURE: 59 MMHG | TEMPERATURE: 97.5 F | SYSTOLIC BLOOD PRESSURE: 124 MMHG | HEART RATE: 63 BPM | OXYGEN SATURATION: 95 % | RESPIRATION RATE: 16 BRPM

## 2025-07-13 VITALS — RESPIRATION RATE: 24 BRPM | OXYGEN SATURATION: 91 % | HEART RATE: 59 BPM

## 2025-07-13 VITALS — OXYGEN SATURATION: 94 %

## 2025-07-13 VITALS
SYSTOLIC BLOOD PRESSURE: 127 MMHG | OXYGEN SATURATION: 95 % | RESPIRATION RATE: 16 BRPM | HEART RATE: 53 BPM | TEMPERATURE: 97.7 F | DIASTOLIC BLOOD PRESSURE: 62 MMHG

## 2025-07-13 VITALS
OXYGEN SATURATION: 95 % | RESPIRATION RATE: 17 BRPM | SYSTOLIC BLOOD PRESSURE: 131 MMHG | HEART RATE: 59 BPM | TEMPERATURE: 97.5 F | DIASTOLIC BLOOD PRESSURE: 53 MMHG

## 2025-07-13 VITALS
DIASTOLIC BLOOD PRESSURE: 65 MMHG | OXYGEN SATURATION: 90 % | TEMPERATURE: 97.7 F | SYSTOLIC BLOOD PRESSURE: 132 MMHG | RESPIRATION RATE: 16 BRPM | HEART RATE: 83 BPM

## 2025-07-13 VITALS — OXYGEN SATURATION: 97 %

## 2025-07-13 VITALS — HEART RATE: 60 BPM

## 2025-07-13 RX ADMIN — ALBUTEROL SULFATE PRN MG: 2.5 SOLUTION RESPIRATORY (INHALATION) at 18:52

## 2025-07-13 RX ADMIN — IPRATROPIUM BROMIDE PRN MG: 0.5 SOLUTION RESPIRATORY (INHALATION) at 18:52

## 2025-07-14 VITALS — HEART RATE: 53 BPM | OXYGEN SATURATION: 99 % | RESPIRATION RATE: 20 BRPM

## 2025-07-14 VITALS — RESPIRATION RATE: 22 BRPM | HEART RATE: 65 BPM | OXYGEN SATURATION: 95 %

## 2025-07-14 VITALS
RESPIRATION RATE: 17 BRPM | DIASTOLIC BLOOD PRESSURE: 57 MMHG | TEMPERATURE: 98 F | SYSTOLIC BLOOD PRESSURE: 144 MMHG | OXYGEN SATURATION: 92 % | HEART RATE: 62 BPM

## 2025-07-14 VITALS
OXYGEN SATURATION: 86 % | HEART RATE: 61 BPM | TEMPERATURE: 98.3 F | SYSTOLIC BLOOD PRESSURE: 138 MMHG | RESPIRATION RATE: 17 BRPM | DIASTOLIC BLOOD PRESSURE: 52 MMHG

## 2025-07-14 VITALS — OXYGEN SATURATION: 90 %

## 2025-07-14 VITALS
OXYGEN SATURATION: 97 % | HEART RATE: 57 BPM | SYSTOLIC BLOOD PRESSURE: 137 MMHG | RESPIRATION RATE: 18 BRPM | DIASTOLIC BLOOD PRESSURE: 60 MMHG

## 2025-07-14 VITALS
SYSTOLIC BLOOD PRESSURE: 140 MMHG | OXYGEN SATURATION: 96 % | HEART RATE: 56 BPM | DIASTOLIC BLOOD PRESSURE: 73 MMHG | RESPIRATION RATE: 18 BRPM | TEMPERATURE: 97.4 F

## 2025-07-14 VITALS
RESPIRATION RATE: 17 BRPM | DIASTOLIC BLOOD PRESSURE: 65 MMHG | SYSTOLIC BLOOD PRESSURE: 149 MMHG | OXYGEN SATURATION: 79 % | TEMPERATURE: 97.7 F | HEART RATE: 74 BPM

## 2025-07-14 VITALS
OXYGEN SATURATION: 97 % | SYSTOLIC BLOOD PRESSURE: 137 MMHG | HEART RATE: 57 BPM | RESPIRATION RATE: 18 BRPM | DIASTOLIC BLOOD PRESSURE: 60 MMHG | TEMPERATURE: 97.8 F

## 2025-07-14 VITALS — HEART RATE: 61 BPM

## 2025-07-14 VITALS — OXYGEN SATURATION: 99 % | HEART RATE: 53 BPM | RESPIRATION RATE: 20 BRPM

## 2025-07-14 VITALS — RESPIRATION RATE: 18 BRPM

## 2025-07-18 ENCOUNTER — HOSPITAL ENCOUNTER (INPATIENT)
Dept: HOSPITAL 15 - ER | Age: 70
LOS: 11 days | Discharge: HOME | DRG: 871 | End: 2025-07-29
Attending: INTERNAL MEDICINE | Admitting: INTERNAL MEDICINE
Payer: COMMERCIAL

## 2025-07-18 VITALS — WEIGHT: 296.08 LBS | HEIGHT: 69 IN | BODY MASS INDEX: 43.85 KG/M2

## 2025-07-18 DIAGNOSIS — I13.0: ICD-10-CM

## 2025-07-18 DIAGNOSIS — Z90.49: ICD-10-CM

## 2025-07-18 DIAGNOSIS — J96.01: ICD-10-CM

## 2025-07-18 DIAGNOSIS — I50.33: ICD-10-CM

## 2025-07-18 DIAGNOSIS — Z86.73: ICD-10-CM

## 2025-07-18 DIAGNOSIS — N18.9: ICD-10-CM

## 2025-07-18 DIAGNOSIS — Z95.5: ICD-10-CM

## 2025-07-18 DIAGNOSIS — B95.7: ICD-10-CM

## 2025-07-18 DIAGNOSIS — F32.A: ICD-10-CM

## 2025-07-18 DIAGNOSIS — Z82.49: ICD-10-CM

## 2025-07-18 DIAGNOSIS — A41.50: Primary | ICD-10-CM

## 2025-07-18 DIAGNOSIS — G89.4: ICD-10-CM

## 2025-07-18 DIAGNOSIS — Z79.82: ICD-10-CM

## 2025-07-18 DIAGNOSIS — Z83.3: ICD-10-CM

## 2025-07-18 DIAGNOSIS — G40.909: ICD-10-CM

## 2025-07-18 DIAGNOSIS — E11.22: ICD-10-CM

## 2025-07-18 DIAGNOSIS — Z82.3: ICD-10-CM

## 2025-07-18 DIAGNOSIS — E66.01: ICD-10-CM

## 2025-07-18 DIAGNOSIS — I25.10: ICD-10-CM

## 2025-07-18 DIAGNOSIS — J98.4: ICD-10-CM

## 2025-07-18 DIAGNOSIS — I34.0: ICD-10-CM

## 2025-07-18 DIAGNOSIS — J44.1: ICD-10-CM

## 2025-07-18 DIAGNOSIS — Z81.8: ICD-10-CM

## 2025-07-18 DIAGNOSIS — J15.69: ICD-10-CM

## 2025-07-18 DIAGNOSIS — F41.9: ICD-10-CM

## 2025-07-18 DIAGNOSIS — E78.5: ICD-10-CM

## 2025-07-18 DIAGNOSIS — Z82.5: ICD-10-CM

## 2025-07-18 DIAGNOSIS — Z79.899: ICD-10-CM

## 2025-07-18 DIAGNOSIS — K21.9: ICD-10-CM

## 2025-07-18 DIAGNOSIS — J44.0: ICD-10-CM

## 2025-07-18 LAB
ALBUMIN SERPL-MCNC: 3.3 G/DL (ref 3.2–4.8)
ALP SERPL-CCNC: 53 U/L (ref 46–116)
ALT SERPL-CCNC: < 9 U/L (ref 7–40)
ANION GAP SERPL CALCULATED.3IONS-SCNC: 7 MMOL/L (ref 5–15)
APTT PPP: 28.8 SEC (ref 24.5–34.5)
AST SERPL-CCNC: 17 U/L (ref 13–40)
BASOPHILS # BLD AUTO: 0 10 ^3/UL (ref 0–0.2)
BASOPHILS NFR BLD AUTO: 0.4 % (ref 0–2)
BILIRUB SERPL-MCNC: 0.5 MG/DL (ref 0.2–1)
BUN SERPL-MCNC: 11 MG/DL (ref 9–23)
BUN/CREAT SERPL: 12 (ref 10–20)
CALCIUM SERPL-MCNC: 9.3 MG/DL (ref 8.7–10.4)
CHLORIDE SERPL-SCNC: 106 MMOL/L (ref 98–107)
CO2 SERPL-SCNC: 28 MMOL/L (ref 20–31)
CREAT SERPL-MCNC: 0.92 MG/DL (ref 0.7–1.3)
EOSINOPHIL # BLD AUTO: 0.2 10 ^3/UL (ref 0–0.8)
EOSINOPHIL NFR BLD AUTO: 3.3 % (ref 0–7)
ERYTHROCYTE [DISTWIDTH] IN BLOOD BY AUTOMATED COUNT: 16.8 % (ref 11.8–14.3)
GLUCOSE SERPL-MCNC: 82 MG/DL (ref 74–106)
HCT VFR BLD AUTO: 32.9 % (ref 41–53)
HGB BLD-MCNC: 10.7 G/DL (ref 13.5–17.5)
INR PPP: 1.05 (ref 0.9–1.15)
LYMPHOCYTES # BLD AUTO: 1.6 10 ^3/UL (ref 0.4–5.4)
LYMPHOCYTES NFR BLD AUTO: 28.5 % (ref 10–50)
MCH RBC QN AUTO: 28.6 PG (ref 28–32)
MCHC RBC AUTO-ENTMCNC: 32.4 G/DL (ref 32–36)
MCV RBC AUTO: 88.2 FL (ref 80–100)
MONOCYTES # BLD AUTO: 0.5 10 ^3/UL (ref 0–1.3)
MONOCYTES NFR BLD AUTO: 9.6 % (ref 0–12)
NEUTROPHILS # BLD AUTO: 3.3 10 ^3/UL (ref 1.6–8.6)
NEUTROPHILS NFR BLD AUTO: 58.2 % (ref 37–80)
NRBC BLD QL AUTO: 0.1 %
PLATELET # BLD AUTO: 186 10^3/UL (ref 140–450)
POTASSIUM SERPL-SCNC: 4.1 MMOL/L (ref 3.5–5.1)
PROT SERPL-MCNC: 6.9 G/DL (ref 5.7–8.2)
PROTHROMBIN TIME: 11.1 SEC (ref 9.3–11.8)
RBC # BLD AUTO: 3.73 10^6/UL (ref 4.5–5.9)
SODIUM SERPL-SCNC: 141 MMOL/L (ref 136–145)
WBC # BLD AUTO: 5.7 10^3/UL (ref 4.4–10.8)

## 2025-07-18 PROCEDURE — 87077 CULTURE AEROBIC IDENTIFY: CPT

## 2025-07-18 PROCEDURE — 80048 BASIC METABOLIC PNL TOTAL CA: CPT

## 2025-07-18 PROCEDURE — 80053 COMPREHEN METABOLIC PANEL: CPT

## 2025-07-18 PROCEDURE — 83605 ASSAY OF LACTIC ACID: CPT

## 2025-07-18 PROCEDURE — 99291 CRITICAL CARE FIRST HOUR: CPT

## 2025-07-18 PROCEDURE — 99152 MOD SED SAME PHYS/QHP 5/>YRS: CPT

## 2025-07-18 PROCEDURE — 85730 THROMBOPLASTIN TIME PARTIAL: CPT

## 2025-07-18 PROCEDURE — 84484 ASSAY OF TROPONIN QUANT: CPT

## 2025-07-18 PROCEDURE — 85610 PROTHROMBIN TIME: CPT

## 2025-07-18 PROCEDURE — 93970 EXTREMITY STUDY: CPT

## 2025-07-18 PROCEDURE — 71045 X-RAY EXAM CHEST 1 VIEW: CPT

## 2025-07-18 PROCEDURE — 97110 THERAPEUTIC EXERCISES: CPT

## 2025-07-18 PROCEDURE — 93306 TTE W/DOPPLER COMPLETE: CPT

## 2025-07-18 PROCEDURE — 87081 CULTURE SCREEN ONLY: CPT

## 2025-07-18 PROCEDURE — 87040 BLOOD CULTURE FOR BACTERIA: CPT

## 2025-07-18 PROCEDURE — 93005 ELECTROCARDIOGRAM TRACING: CPT

## 2025-07-18 PROCEDURE — 82962 GLUCOSE BLOOD TEST: CPT

## 2025-07-18 PROCEDURE — 86901 BLOOD TYPING SEROLOGIC RH(D): CPT

## 2025-07-18 PROCEDURE — 81001 URINALYSIS AUTO W/SCOPE: CPT

## 2025-07-18 PROCEDURE — 97530 THERAPEUTIC ACTIVITIES: CPT

## 2025-07-18 PROCEDURE — 36415 COLL VENOUS BLD VENIPUNCTURE: CPT

## 2025-07-18 PROCEDURE — 83880 ASSAY OF NATRIURETIC PEPTIDE: CPT

## 2025-07-18 PROCEDURE — 82565 ASSAY OF CREATININE: CPT

## 2025-07-18 PROCEDURE — 85025 COMPLETE CBC W/AUTO DIFF WBC: CPT

## 2025-07-18 PROCEDURE — 97163 PT EVAL HIGH COMPLEX 45 MIN: CPT

## 2025-07-18 PROCEDURE — 86900 BLOOD TYPING SEROLOGIC ABO: CPT

## 2025-07-18 PROCEDURE — 80202 ASSAY OF VANCOMYCIN: CPT

## 2025-07-18 PROCEDURE — 93312 ECHO TRANSESOPHAGEAL: CPT

## 2025-07-18 PROCEDURE — 86850 RBC ANTIBODY SCREEN: CPT

## 2025-07-18 PROCEDURE — 87186 SC STD MICRODIL/AGAR DIL: CPT

## 2025-07-19 VITALS — RESPIRATION RATE: 18 BRPM | HEART RATE: 62 BPM | OXYGEN SATURATION: 95 %

## 2025-07-19 VITALS
TEMPERATURE: 98 F | SYSTOLIC BLOOD PRESSURE: 107 MMHG | RESPIRATION RATE: 18 BRPM | DIASTOLIC BLOOD PRESSURE: 44 MMHG | HEART RATE: 63 BPM | OXYGEN SATURATION: 95 %

## 2025-07-19 VITALS
DIASTOLIC BLOOD PRESSURE: 82 MMHG | OXYGEN SATURATION: 97 % | HEART RATE: 59 BPM | SYSTOLIC BLOOD PRESSURE: 150 MMHG | TEMPERATURE: 99 F | RESPIRATION RATE: 19 BRPM

## 2025-07-19 VITALS — OXYGEN SATURATION: 95 %

## 2025-07-19 VITALS
RESPIRATION RATE: 18 BRPM | DIASTOLIC BLOOD PRESSURE: 82 MMHG | HEART RATE: 59 BPM | TEMPERATURE: 99 F | OXYGEN SATURATION: 97 % | SYSTOLIC BLOOD PRESSURE: 150 MMHG

## 2025-07-19 VITALS — OXYGEN SATURATION: 98 % | HEART RATE: 78 BPM | RESPIRATION RATE: 18 BRPM

## 2025-07-19 LAB
ALBUMIN SERPL-MCNC: 3.1 G/DL (ref 3.2–4.8)
ALP SERPL-CCNC: 52 U/L (ref 46–116)
ALT SERPL-CCNC: 11 U/L (ref 7–40)
ANION GAP SERPL CALCULATED.3IONS-SCNC: 7 MMOL/L (ref 5–15)
AST SERPL-CCNC: 20 U/L (ref 13–40)
BASOPHILS # BLD AUTO: 0 10 ^3/UL (ref 0–0.2)
BASOPHILS NFR BLD AUTO: 0.5 % (ref 0–2)
BILIRUB SERPL-MCNC: 0.4 MG/DL (ref 0.2–1)
BUN SERPL-MCNC: 13 MG/DL (ref 9–23)
BUN/CREAT SERPL: 14.6 (ref 10–20)
CALCIUM SERPL-MCNC: 8.9 MG/DL (ref 8.7–10.4)
CHLORIDE SERPL-SCNC: 107 MMOL/L (ref 98–107)
CO2 SERPL-SCNC: 27 MMOL/L (ref 20–31)
CREAT SERPL-MCNC: 0.89 MG/DL (ref 0.7–1.3)
EOSINOPHIL # BLD AUTO: 0.2 10 ^3/UL (ref 0–0.8)
EOSINOPHIL NFR BLD AUTO: 3 % (ref 0–7)
ERYTHROCYTE [DISTWIDTH] IN BLOOD BY AUTOMATED COUNT: 16.7 % (ref 11.8–14.3)
GLUCOSE SERPL-MCNC: 81 MG/DL (ref 74–106)
HCT VFR BLD AUTO: 31 % (ref 41–53)
HGB BLD-MCNC: 10.1 G/DL (ref 13.5–17.5)
LYMPHOCYTES # BLD AUTO: 1.5 10 ^3/UL (ref 0.4–5.4)
LYMPHOCYTES NFR BLD AUTO: 25.6 % (ref 10–50)
MCH RBC QN AUTO: 28.8 PG (ref 28–32)
MCHC RBC AUTO-ENTMCNC: 32.7 G/DL (ref 32–36)
MCV RBC AUTO: 88.1 FL (ref 80–100)
MONOCYTES # BLD AUTO: 0.5 10 ^3/UL (ref 0–1.3)
MONOCYTES NFR BLD AUTO: 8.7 % (ref 0–12)
NEUTROPHILS # BLD AUTO: 3.6 10 ^3/UL (ref 1.6–8.6)
NEUTROPHILS NFR BLD AUTO: 62.2 % (ref 37–80)
NRBC BLD QL AUTO: 0 %
PLATELET # BLD AUTO: 194 10^3/UL (ref 140–450)
POTASSIUM SERPL-SCNC: 4.4 MMOL/L (ref 3.5–5.1)
PROT SERPL-MCNC: 6.3 G/DL (ref 5.7–8.2)
RBC # BLD AUTO: 3.52 10^6/UL (ref 4.5–5.9)
SODIUM SERPL-SCNC: 141 MMOL/L (ref 136–145)
WBC # BLD AUTO: 5.8 10^3/UL (ref 4.4–10.8)

## 2025-07-19 RX ADMIN — MORPHINE SULFATE PRN MG: 2 INJECTION, SOLUTION INTRAMUSCULAR; INTRAVENOUS at 04:15

## 2025-07-19 RX ADMIN — AZITHROMYCIN DIHYDRATE ONE MLS/HR: 500 INJECTION, POWDER, LYOPHILIZED, FOR SOLUTION INTRAVENOUS at 08:10

## 2025-07-19 RX ADMIN — TRAZODONE HYDROCHLORIDE SCH MG: 50 TABLET ORAL at 23:59

## 2025-07-19 RX ADMIN — ASPIRIN SCH MG: 81 TABLET, COATED ORAL at 09:01

## 2025-07-19 RX ADMIN — ASPIRIN ONE MG: 81 TABLET, COATED ORAL at 01:06

## 2025-07-19 RX ADMIN — CEFTRIAXONE SODIUM SCH MLS/HR: 1 INJECTION, POWDER, FOR SOLUTION INTRAMUSCULAR; INTRAVENOUS at 09:00

## 2025-07-19 RX ADMIN — HUMAN INSULIN SCH UNITS: 100 INJECTION, SOLUTION SUBCUTANEOUS at 06:41

## 2025-07-19 RX ADMIN — CLOPIDOGREL BISULFATE SCH MG: 75 TABLET ORAL at 09:01

## 2025-07-19 RX ADMIN — AZITHROMYCIN DIHYDRATE SCH MLS/HR: 500 INJECTION, POWDER, LYOPHILIZED, FOR SOLUTION INTRAVENOUS at 10:07

## 2025-07-19 RX ADMIN — ATORVASTATIN CALCIUM SCH MG: 20 TABLET, FILM COATED ORAL at 23:58

## 2025-07-19 RX ADMIN — MIDODRINE HYDROCHLORIDE SCH MG: 10 TABLET ORAL at 06:20

## 2025-07-19 RX ADMIN — CEFTRIAXONE SODIUM ONE MLS/HR: 1 INJECTION, POWDER, FOR SOLUTION INTRAMUSCULAR; INTRAVENOUS at 02:51

## 2025-07-19 RX ADMIN — Medication SCH STRIP: at 06:41

## 2025-07-19 RX ADMIN — SODIUM CHLORIDE SCH ML: 9 INJECTION INTRAMUSCULAR; INTRAVENOUS; SUBCUTANEOUS at 06:11

## 2025-07-19 RX ADMIN — OXYCODONE HYDROCHLORIDE AND ACETAMINOPHEN ONE TAB: 5; 325 TABLET ORAL at 01:06

## 2025-07-20 VITALS
SYSTOLIC BLOOD PRESSURE: 143 MMHG | OXYGEN SATURATION: 98 % | DIASTOLIC BLOOD PRESSURE: 65 MMHG | RESPIRATION RATE: 20 BRPM | TEMPERATURE: 97.7 F | HEART RATE: 56 BPM

## 2025-07-20 VITALS
DIASTOLIC BLOOD PRESSURE: 78 MMHG | HEART RATE: 65 BPM | SYSTOLIC BLOOD PRESSURE: 162 MMHG | TEMPERATURE: 97.9 F | OXYGEN SATURATION: 97 % | RESPIRATION RATE: 15 BRPM

## 2025-07-20 VITALS
OXYGEN SATURATION: 98 % | TEMPERATURE: 97.9 F | HEART RATE: 66 BPM | SYSTOLIC BLOOD PRESSURE: 133 MMHG | DIASTOLIC BLOOD PRESSURE: 76 MMHG | RESPIRATION RATE: 22 BRPM

## 2025-07-20 VITALS — HEART RATE: 59 BPM

## 2025-07-20 VITALS
OXYGEN SATURATION: 96 % | DIASTOLIC BLOOD PRESSURE: 86 MMHG | TEMPERATURE: 98 F | SYSTOLIC BLOOD PRESSURE: 153 MMHG | HEART RATE: 65 BPM | RESPIRATION RATE: 20 BRPM

## 2025-07-20 VITALS — HEART RATE: 53 BPM

## 2025-07-20 VITALS
OXYGEN SATURATION: 95 % | DIASTOLIC BLOOD PRESSURE: 78 MMHG | SYSTOLIC BLOOD PRESSURE: 158 MMHG | TEMPERATURE: 97.8 F | RESPIRATION RATE: 18 BRPM | HEART RATE: 59 BPM

## 2025-07-20 LAB
ALBUMIN SERPL-MCNC: 3.4 G/DL (ref 3.2–4.8)
ALP SERPL-CCNC: 57 U/L (ref 46–116)
ALT SERPL-CCNC: 10 U/L (ref 7–40)
ANION GAP SERPL CALCULATED.3IONS-SCNC: 5 MMOL/L (ref 5–15)
AST SERPL-CCNC: 19 U/L (ref 13–40)
BASOPHILS # BLD AUTO: 0 10 ^3/UL (ref 0–0.2)
BASOPHILS NFR BLD AUTO: 0.3 % (ref 0–2)
BILIRUB SERPL-MCNC: 0.3 MG/DL (ref 0.2–1)
BUN SERPL-MCNC: 12 MG/DL (ref 9–23)
BUN/CREAT SERPL: 12 (ref 10–20)
CALCIUM SERPL-MCNC: 10.2 MG/DL (ref 8.7–10.4)
CHLORIDE SERPL-SCNC: 105 MMOL/L (ref 98–107)
CO2 SERPL-SCNC: 31 MMOL/L (ref 20–31)
CREAT SERPL-MCNC: 1 MG/DL (ref 0.7–1.3)
EOSINOPHIL # BLD AUTO: 0.3 10 ^3/UL (ref 0–0.8)
EOSINOPHIL NFR BLD AUTO: 4 % (ref 0–7)
ERYTHROCYTE [DISTWIDTH] IN BLOOD BY AUTOMATED COUNT: 17.1 % (ref 11.8–14.3)
GLUCOSE SERPL-MCNC: 86 MG/DL (ref 74–106)
HCT VFR BLD AUTO: 34.4 % (ref 41–53)
HGB BLD-MCNC: 11.4 G/DL (ref 13.5–17.5)
LYMPHOCYTES # BLD AUTO: 1.4 10 ^3/UL (ref 0.4–5.4)
LYMPHOCYTES NFR BLD AUTO: 22.4 % (ref 10–50)
MCH RBC QN AUTO: 29.4 PG (ref 28–32)
MCHC RBC AUTO-ENTMCNC: 33.2 G/DL (ref 32–36)
MCV RBC AUTO: 88.6 FL (ref 80–100)
MONOCYTES # BLD AUTO: 0.6 10 ^3/UL (ref 0–1.3)
MONOCYTES NFR BLD AUTO: 10.1 % (ref 0–12)
NEUTROPHILS # BLD AUTO: 4 10 ^3/UL (ref 1.6–8.6)
NEUTROPHILS NFR BLD AUTO: 63.2 % (ref 37–80)
NRBC BLD QL AUTO: 0.1 %
PLATELET # BLD AUTO: 192 10^3/UL (ref 140–450)
POTASSIUM SERPL-SCNC: 4.3 MMOL/L (ref 3.5–5.1)
PROT SERPL-MCNC: 7 G/DL (ref 5.7–8.2)
RBC # BLD AUTO: 3.89 10^6/UL (ref 4.5–5.9)
SODIUM SERPL-SCNC: 141 MMOL/L (ref 136–145)
WBC # BLD AUTO: 6.3 10^3/UL (ref 4.4–10.8)

## 2025-07-20 RX ADMIN — FUROSEMIDE SCH MG: 10 INJECTION, SOLUTION INTRAMUSCULAR; INTRAVENOUS at 18:00

## 2025-07-20 RX ADMIN — FUROSEMIDE ONE MG: 10 INJECTION, SOLUTION INTRAMUSCULAR; INTRAVENOUS at 16:45

## 2025-07-20 RX ADMIN — RANOLAZINE SCH MG: 500 TABLET, FILM COATED, EXTENDED RELEASE ORAL at 21:03

## 2025-07-20 RX ADMIN — ENOXAPARIN SODIUM ONE MG: 40 INJECTION SUBCUTANEOUS at 17:58

## 2025-07-20 RX ADMIN — METOPROLOL TARTRATE SCH MG: 25 TABLET, FILM COATED ORAL at 21:04

## 2025-07-21 VITALS
SYSTOLIC BLOOD PRESSURE: 154 MMHG | DIASTOLIC BLOOD PRESSURE: 83 MMHG | HEART RATE: 61 BPM | TEMPERATURE: 98.3 F | OXYGEN SATURATION: 98 % | RESPIRATION RATE: 17 BRPM

## 2025-07-21 VITALS
DIASTOLIC BLOOD PRESSURE: 74 MMHG | OXYGEN SATURATION: 96 % | TEMPERATURE: 98.3 F | SYSTOLIC BLOOD PRESSURE: 147 MMHG | RESPIRATION RATE: 16 BRPM | HEART RATE: 53 BPM

## 2025-07-21 VITALS
RESPIRATION RATE: 19 BRPM | OXYGEN SATURATION: 98 % | SYSTOLIC BLOOD PRESSURE: 139 MMHG | DIASTOLIC BLOOD PRESSURE: 90 MMHG | TEMPERATURE: 97.9 F | HEART RATE: 60 BPM

## 2025-07-21 VITALS
TEMPERATURE: 98 F | SYSTOLIC BLOOD PRESSURE: 163 MMHG | RESPIRATION RATE: 16 BRPM | OXYGEN SATURATION: 98 % | DIASTOLIC BLOOD PRESSURE: 85 MMHG | HEART RATE: 56 BPM

## 2025-07-21 VITALS
TEMPERATURE: 98 F | OXYGEN SATURATION: 98 % | HEART RATE: 58 BPM | DIASTOLIC BLOOD PRESSURE: 70 MMHG | RESPIRATION RATE: 16 BRPM | SYSTOLIC BLOOD PRESSURE: 152 MMHG

## 2025-07-21 VITALS
TEMPERATURE: 98.1 F | SYSTOLIC BLOOD PRESSURE: 156 MMHG | DIASTOLIC BLOOD PRESSURE: 74 MMHG | OXYGEN SATURATION: 94 % | RESPIRATION RATE: 16 BRPM | HEART RATE: 59 BPM

## 2025-07-21 VITALS — HEART RATE: 59 BPM

## 2025-07-21 VITALS — HEART RATE: 62 BPM | RESPIRATION RATE: 17 BRPM

## 2025-07-21 LAB
ANION GAP SERPL CALCULATED.3IONS-SCNC: 6 MMOL/L (ref 5–15)
BASOPHILS # BLD AUTO: 0 10 ^3/UL (ref 0–0.2)
BASOPHILS NFR BLD AUTO: 0.3 % (ref 0–2)
BUN SERPL-MCNC: 15 MG/DL (ref 9–23)
BUN/CREAT SERPL: 13.9 (ref 10–20)
CALCIUM SERPL-MCNC: 10 MG/DL (ref 8.7–10.4)
CHLORIDE SERPL-SCNC: 106 MMOL/L (ref 98–107)
CO2 SERPL-SCNC: 32 MMOL/L (ref 20–31)
CREAT SERPL-MCNC: 1.08 MG/DL (ref 0.7–1.3)
EOSINOPHIL # BLD AUTO: 0.3 10 ^3/UL (ref 0–0.8)
EOSINOPHIL NFR BLD AUTO: 3.6 % (ref 0–7)
ERYTHROCYTE [DISTWIDTH] IN BLOOD BY AUTOMATED COUNT: 17.6 % (ref 11.8–14.3)
GLUCOSE SERPL-MCNC: 104 MG/DL (ref 74–106)
HCT VFR BLD AUTO: 33.4 % (ref 41–53)
HGB BLD-MCNC: 10.8 G/DL (ref 13.5–17.5)
LYMPHOCYTES # BLD AUTO: 1.7 10 ^3/UL (ref 0.4–5.4)
LYMPHOCYTES NFR BLD AUTO: 23.9 % (ref 10–50)
MCH RBC QN AUTO: 28.5 PG (ref 28–32)
MCHC RBC AUTO-ENTMCNC: 32.4 G/DL (ref 32–36)
MCV RBC AUTO: 87.9 FL (ref 80–100)
MONOCYTES # BLD AUTO: 0.6 10 ^3/UL (ref 0–1.3)
MONOCYTES NFR BLD AUTO: 7.9 % (ref 0–12)
NEUTROPHILS # BLD AUTO: 4.7 10 ^3/UL (ref 1.6–8.6)
NEUTROPHILS NFR BLD AUTO: 64.3 % (ref 37–80)
NRBC BLD QL AUTO: 0 %
PLATELET # BLD AUTO: 205 10^3/UL (ref 140–450)
POTASSIUM SERPL-SCNC: 3.7 MMOL/L (ref 3.5–5.1)
RBC # BLD AUTO: 3.8 10^6/UL (ref 4.5–5.9)
SODIUM SERPL-SCNC: 144 MMOL/L (ref 136–145)
WBC # BLD AUTO: 7.3 10^3/UL (ref 4.4–10.8)

## 2025-07-21 RX ADMIN — OXYCODONE HYDROCHLORIDE AND ACETAMINOPHEN ONE TAB: 5; 325 TABLET ORAL at 02:53

## 2025-07-21 RX ADMIN — LOSARTAN POTASSIUM SCH MG: 25 TABLET, FILM COATED ORAL at 09:17

## 2025-07-21 RX ADMIN — METHYLPREDNISOLONE SODIUM SUCCINATE SCH MG: 125 INJECTION, POWDER, FOR SOLUTION INTRAMUSCULAR; INTRAVENOUS at 14:06

## 2025-07-21 RX ADMIN — ENOXAPARIN SODIUM SCH MG: 40 INJECTION SUBCUTANEOUS at 09:17

## 2025-07-21 RX ADMIN — EMPAGLIFLOZIN SCH MG: 10 TABLET, FILM COATED ORAL at 10:28

## 2025-07-22 VITALS
SYSTOLIC BLOOD PRESSURE: 149 MMHG | DIASTOLIC BLOOD PRESSURE: 102 MMHG | OXYGEN SATURATION: 93 % | HEART RATE: 62 BPM | RESPIRATION RATE: 17 BRPM | TEMPERATURE: 98.3 F

## 2025-07-22 VITALS
HEART RATE: 64 BPM | RESPIRATION RATE: 18 BRPM | SYSTOLIC BLOOD PRESSURE: 147 MMHG | DIASTOLIC BLOOD PRESSURE: 78 MMHG | TEMPERATURE: 98 F | OXYGEN SATURATION: 94 %

## 2025-07-22 VITALS — OXYGEN SATURATION: 98 % | HEART RATE: 63 BPM | RESPIRATION RATE: 16 BRPM

## 2025-07-22 VITALS — HEART RATE: 65 BPM

## 2025-07-22 VITALS
HEART RATE: 64 BPM | OXYGEN SATURATION: 89 % | TEMPERATURE: 97.2 F | DIASTOLIC BLOOD PRESSURE: 74 MMHG | RESPIRATION RATE: 17 BRPM | SYSTOLIC BLOOD PRESSURE: 117 MMHG

## 2025-07-22 VITALS
SYSTOLIC BLOOD PRESSURE: 143 MMHG | DIASTOLIC BLOOD PRESSURE: 93 MMHG | OXYGEN SATURATION: 98 % | HEART RATE: 57 BPM | RESPIRATION RATE: 16 BRPM | TEMPERATURE: 98.3 F

## 2025-07-22 VITALS
OXYGEN SATURATION: 93 % | TEMPERATURE: 97.7 F | RESPIRATION RATE: 16 BRPM | DIASTOLIC BLOOD PRESSURE: 90 MMHG | HEART RATE: 62 BPM | SYSTOLIC BLOOD PRESSURE: 164 MMHG

## 2025-07-22 VITALS
OXYGEN SATURATION: 93 % | HEART RATE: 68 BPM | DIASTOLIC BLOOD PRESSURE: 89 MMHG | SYSTOLIC BLOOD PRESSURE: 146 MMHG | RESPIRATION RATE: 16 BRPM | TEMPERATURE: 98 F

## 2025-07-22 VITALS
OXYGEN SATURATION: 93 % | TEMPERATURE: 98 F | DIASTOLIC BLOOD PRESSURE: 86 MMHG | HEART RATE: 64 BPM | RESPIRATION RATE: 16 BRPM | SYSTOLIC BLOOD PRESSURE: 146 MMHG

## 2025-07-22 VITALS — OXYGEN SATURATION: 98 % | RESPIRATION RATE: 16 BRPM

## 2025-07-22 LAB
ANION GAP SERPL CALCULATED.3IONS-SCNC: 8 MMOL/L (ref 5–15)
BASOPHILS # BLD AUTO: 0 10 ^3/UL (ref 0–0.2)
BASOPHILS NFR BLD AUTO: 0.1 % (ref 0–2)
BUN SERPL-MCNC: 19 MG/DL (ref 9–23)
BUN/CREAT SERPL: 16.1 (ref 10–20)
CALCIUM SERPL-MCNC: 9.4 MG/DL (ref 8.7–10.4)
CHLORIDE SERPL-SCNC: 105 MMOL/L (ref 98–107)
CO2 SERPL-SCNC: 31 MMOL/L (ref 20–31)
CREAT SERPL-MCNC: 1.18 MG/DL (ref 0.7–1.3)
EOSINOPHIL # BLD AUTO: 0 10 ^3/UL (ref 0–0.8)
EOSINOPHIL NFR BLD AUTO: 0 % (ref 0–7)
ERYTHROCYTE [DISTWIDTH] IN BLOOD BY AUTOMATED COUNT: 17 % (ref 11.8–14.3)
GLUCOSE SERPL-MCNC: 127 MG/DL (ref 74–106)
HCT VFR BLD AUTO: 33.6 % (ref 41–53)
HGB BLD-MCNC: 11.3 G/DL (ref 13.5–17.5)
LYMPHOCYTES # BLD AUTO: 0.7 10 ^3/UL (ref 0.4–5.4)
LYMPHOCYTES NFR BLD AUTO: 12.2 % (ref 10–50)
MCH RBC QN AUTO: 29.1 PG (ref 28–32)
MCHC RBC AUTO-ENTMCNC: 33.6 G/DL (ref 32–36)
MCV RBC AUTO: 86.4 FL (ref 80–100)
MONOCYTES # BLD AUTO: 0.1 10 ^3/UL (ref 0–1.3)
MONOCYTES NFR BLD AUTO: 1.7 % (ref 0–12)
NEUTROPHILS # BLD AUTO: 5.2 10 ^3/UL (ref 1.6–8.6)
NEUTROPHILS NFR BLD AUTO: 86 % (ref 37–80)
NRBC BLD QL AUTO: 0 %
PLATELET # BLD AUTO: 229 10^3/UL (ref 140–450)
POTASSIUM SERPL-SCNC: 3.9 MMOL/L (ref 3.5–5.1)
RBC # BLD AUTO: 3.89 10^6/UL (ref 4.5–5.9)
SODIUM SERPL-SCNC: 144 MMOL/L (ref 136–145)
WBC # BLD AUTO: 6.1 10^3/UL (ref 4.4–10.8)

## 2025-07-22 RX ADMIN — LEVOFLOXACIN SCH MLS/HR: 5 INJECTION, SOLUTION INTRAVENOUS at 14:12

## 2025-07-22 RX ADMIN — MUPIROCIN SCH APPLIC: 20 OINTMENT TOPICAL at 20:57

## 2025-07-22 RX ADMIN — VANCOMYCIN HYDROCHLORIDE SCH MLS/HR: 1 INJECTION, POWDER, LYOPHILIZED, FOR SOLUTION INTRAVENOUS at 15:45

## 2025-07-22 RX ADMIN — OXYCODONE HYDROCHLORIDE AND ACETAMINOPHEN PRN TAB: 5; 325 TABLET ORAL at 14:13

## 2025-07-23 VITALS
SYSTOLIC BLOOD PRESSURE: 151 MMHG | DIASTOLIC BLOOD PRESSURE: 49 MMHG | RESPIRATION RATE: 21 BRPM | HEART RATE: 57 BPM | OXYGEN SATURATION: 96 % | TEMPERATURE: 98.2 F

## 2025-07-23 VITALS — OXYGEN SATURATION: 98 % | RESPIRATION RATE: 20 BRPM | HEART RATE: 55 BPM

## 2025-07-23 VITALS
OXYGEN SATURATION: 96 % | SYSTOLIC BLOOD PRESSURE: 123 MMHG | RESPIRATION RATE: 20 BRPM | TEMPERATURE: 98.4 F | HEART RATE: 63 BPM | DIASTOLIC BLOOD PRESSURE: 58 MMHG

## 2025-07-23 VITALS — RESPIRATION RATE: 17 BRPM | HEART RATE: 58 BPM | OXYGEN SATURATION: 95 %

## 2025-07-23 VITALS
RESPIRATION RATE: 17 BRPM | OXYGEN SATURATION: 95 % | SYSTOLIC BLOOD PRESSURE: 114 MMHG | HEART RATE: 55 BPM | TEMPERATURE: 97.5 F | DIASTOLIC BLOOD PRESSURE: 72 MMHG

## 2025-07-23 VITALS
HEART RATE: 53 BPM | SYSTOLIC BLOOD PRESSURE: 119 MMHG | RESPIRATION RATE: 20 BRPM | DIASTOLIC BLOOD PRESSURE: 60 MMHG | OXYGEN SATURATION: 98 % | TEMPERATURE: 97.5 F

## 2025-07-23 VITALS
DIASTOLIC BLOOD PRESSURE: 73 MMHG | SYSTOLIC BLOOD PRESSURE: 135 MMHG | TEMPERATURE: 97.9 F | OXYGEN SATURATION: 96 % | RESPIRATION RATE: 18 BRPM | HEART RATE: 62 BPM

## 2025-07-23 VITALS
SYSTOLIC BLOOD PRESSURE: 123 MMHG | HEART RATE: 63 BPM | RESPIRATION RATE: 20 BRPM | DIASTOLIC BLOOD PRESSURE: 58 MMHG | OXYGEN SATURATION: 96 % | TEMPERATURE: 98.4 F

## 2025-07-23 VITALS
HEART RATE: 75 BPM | SYSTOLIC BLOOD PRESSURE: 127 MMHG | DIASTOLIC BLOOD PRESSURE: 76 MMHG | TEMPERATURE: 97.6 F | OXYGEN SATURATION: 96 % | RESPIRATION RATE: 20 BRPM

## 2025-07-23 LAB
ANION GAP SERPL CALCULATED.3IONS-SCNC: 10 MMOL/L (ref 5–15)
BASOPHILS # BLD AUTO: 0 10 ^3/UL (ref 0–0.2)
BASOPHILS NFR BLD AUTO: 0 % (ref 0–2)
BUN SERPL-MCNC: 28 MG/DL (ref 9–23)
BUN/CREAT SERPL: 20 (ref 10–20)
CALCIUM SERPL-MCNC: 10.1 MG/DL (ref 8.7–10.4)
CHLORIDE SERPL-SCNC: 102 MMOL/L (ref 98–107)
CO2 SERPL-SCNC: 31 MMOL/L (ref 20–31)
CREAT SERPL-MCNC: 1.4 MG/DL (ref 0.7–1.3)
EOSINOPHIL # BLD AUTO: 0 10 ^3/UL (ref 0–0.8)
EOSINOPHIL NFR BLD AUTO: 0 % (ref 0–7)
ERYTHROCYTE [DISTWIDTH] IN BLOOD BY AUTOMATED COUNT: 16.9 % (ref 11.8–14.3)
GLUCOSE SERPL-MCNC: 139 MG/DL (ref 74–106)
HCT VFR BLD AUTO: 34 % (ref 41–53)
HGB BLD-MCNC: 11.1 G/DL (ref 13.5–17.5)
LYMPHOCYTES # BLD AUTO: 1 10 ^3/UL (ref 0.4–5.4)
LYMPHOCYTES NFR BLD AUTO: 7.7 % (ref 10–50)
MCH RBC QN AUTO: 28.4 PG (ref 28–32)
MCHC RBC AUTO-ENTMCNC: 32.7 G/DL (ref 32–36)
MCV RBC AUTO: 87 FL (ref 80–100)
MONOCYTES # BLD AUTO: 0.5 10 ^3/UL (ref 0–1.3)
MONOCYTES NFR BLD AUTO: 3.7 % (ref 0–12)
NEUTROPHILS # BLD AUTO: 11.8 10 ^3/UL (ref 1.6–8.6)
NEUTROPHILS NFR BLD AUTO: 88.6 % (ref 37–80)
NRBC BLD QL AUTO: 0.1 %
PLATELET # BLD AUTO: 257 10^3/UL (ref 140–450)
POTASSIUM SERPL-SCNC: 3.7 MMOL/L (ref 3.5–5.1)
RBC # BLD AUTO: 3.91 10^6/UL (ref 4.5–5.9)
SODIUM SERPL-SCNC: 143 MMOL/L (ref 136–145)
WBC # BLD AUTO: 13.3 10^3/UL (ref 4.4–10.8)

## 2025-07-23 RX ADMIN — VANCOMYCIN HYDROCHLORIDE SCH MLS/HR: 750 INJECTION, POWDER, LYOPHILIZED, FOR SOLUTION INTRAVENOUS at 13:37

## 2025-07-24 VITALS
HEART RATE: 60 BPM | TEMPERATURE: 97.8 F | RESPIRATION RATE: 16 BRPM | SYSTOLIC BLOOD PRESSURE: 118 MMHG | DIASTOLIC BLOOD PRESSURE: 75 MMHG | OXYGEN SATURATION: 94 %

## 2025-07-24 VITALS
HEART RATE: 57 BPM | TEMPERATURE: 98 F | OXYGEN SATURATION: 96 % | RESPIRATION RATE: 21 BRPM | SYSTOLIC BLOOD PRESSURE: 139 MMHG | DIASTOLIC BLOOD PRESSURE: 71 MMHG

## 2025-07-24 VITALS — OXYGEN SATURATION: 98 % | HEART RATE: 65 BPM | RESPIRATION RATE: 18 BRPM

## 2025-07-24 VITALS — RESPIRATION RATE: 17 BRPM | OXYGEN SATURATION: 96 % | HEART RATE: 55 BPM

## 2025-07-24 VITALS
TEMPERATURE: 98.3 F | OXYGEN SATURATION: 99 % | DIASTOLIC BLOOD PRESSURE: 72 MMHG | RESPIRATION RATE: 17 BRPM | HEART RATE: 75 BPM | SYSTOLIC BLOOD PRESSURE: 110 MMHG

## 2025-07-24 VITALS
OXYGEN SATURATION: 96 % | TEMPERATURE: 97.6 F | HEART RATE: 59 BPM | DIASTOLIC BLOOD PRESSURE: 89 MMHG | SYSTOLIC BLOOD PRESSURE: 123 MMHG | RESPIRATION RATE: 17 BRPM

## 2025-07-24 LAB
ANION GAP SERPL CALCULATED.3IONS-SCNC: 9 MMOL/L (ref 5–15)
BASOPHILS # BLD AUTO: 0 10 ^3/UL (ref 0–0.2)
BASOPHILS NFR BLD AUTO: 0 % (ref 0–2)
BUN SERPL-MCNC: 41 MG/DL (ref 9–23)
BUN/CREAT SERPL: 24.4 (ref 10–20)
CALCIUM SERPL-MCNC: 9 MG/DL (ref 8.7–10.4)
CHLORIDE SERPL-SCNC: 104 MMOL/L (ref 98–107)
CO2 SERPL-SCNC: 29 MMOL/L (ref 20–31)
CREAT SERPL-MCNC: 1.68 MG/DL (ref 0.7–1.3)
EOSINOPHIL # BLD AUTO: 0 10 ^3/UL (ref 0–0.8)
EOSINOPHIL NFR BLD AUTO: 0 % (ref 0–7)
ERYTHROCYTE [DISTWIDTH] IN BLOOD BY AUTOMATED COUNT: 18.1 % (ref 11.8–14.3)
GLUCOSE SERPL-MCNC: 177 MG/DL (ref 74–106)
HCT VFR BLD AUTO: 36 % (ref 41–53)
HGB BLD-MCNC: 11.8 G/DL (ref 13.5–17.5)
LYMPHOCYTES # BLD AUTO: 0.7 10 ^3/UL (ref 0.4–5.4)
LYMPHOCYTES NFR BLD AUTO: 4.9 % (ref 10–50)
MCH RBC QN AUTO: 28.5 PG (ref 28–32)
MCHC RBC AUTO-ENTMCNC: 32.7 G/DL (ref 32–36)
MCV RBC AUTO: 87.1 FL (ref 80–100)
MONOCYTES # BLD AUTO: 0.5 10 ^3/UL (ref 0–1.3)
MONOCYTES NFR BLD AUTO: 3.3 % (ref 0–12)
NEUTROPHILS # BLD AUTO: 14 10 ^3/UL (ref 1.6–8.6)
NEUTROPHILS NFR BLD AUTO: 91.8 % (ref 37–80)
NRBC BLD QL AUTO: 0 %
PLATELET # BLD AUTO: 282 10^3/UL (ref 140–450)
POTASSIUM SERPL-SCNC: 3.5 MMOL/L (ref 3.5–5.1)
RBC # BLD AUTO: 4.13 10^6/UL (ref 4.5–5.9)
SODIUM SERPL-SCNC: 142 MMOL/L (ref 136–145)
WBC # BLD AUTO: 15.2 10^3/UL (ref 4.4–10.8)

## 2025-07-25 VITALS
SYSTOLIC BLOOD PRESSURE: 125 MMHG | HEART RATE: 58 BPM | TEMPERATURE: 98.4 F | DIASTOLIC BLOOD PRESSURE: 68 MMHG | RESPIRATION RATE: 17 BRPM | OXYGEN SATURATION: 95 %

## 2025-07-25 VITALS
HEART RATE: 54 BPM | TEMPERATURE: 98.3 F | RESPIRATION RATE: 17 BRPM | OXYGEN SATURATION: 91 % | DIASTOLIC BLOOD PRESSURE: 53 MMHG | SYSTOLIC BLOOD PRESSURE: 125 MMHG

## 2025-07-25 VITALS
OXYGEN SATURATION: 95 % | SYSTOLIC BLOOD PRESSURE: 144 MMHG | RESPIRATION RATE: 18 BRPM | DIASTOLIC BLOOD PRESSURE: 71 MMHG | HEART RATE: 55 BPM | TEMPERATURE: 97.9 F

## 2025-07-25 VITALS — HEART RATE: 53 BPM | OXYGEN SATURATION: 95 %

## 2025-07-25 VITALS — RESPIRATION RATE: 18 BRPM | OXYGEN SATURATION: 95 % | HEART RATE: 55 BPM

## 2025-07-25 VITALS
HEART RATE: 74 BPM | SYSTOLIC BLOOD PRESSURE: 125 MMHG | RESPIRATION RATE: 18 BRPM | TEMPERATURE: 97.6 F | DIASTOLIC BLOOD PRESSURE: 65 MMHG | OXYGEN SATURATION: 95 %

## 2025-07-25 LAB
ANION GAP SERPL CALCULATED.3IONS-SCNC: 8 MMOL/L (ref 5–15)
BASOPHILS # BLD AUTO: 0 10 ^3/UL (ref 0–0.2)
BASOPHILS NFR BLD AUTO: 0 % (ref 0–2)
BUN SERPL-MCNC: 56 MG/DL (ref 9–23)
BUN/CREAT SERPL: 28.6 (ref 10–20)
CALCIUM SERPL-MCNC: 9.5 MG/DL (ref 8.7–10.4)
CHLORIDE SERPL-SCNC: 104 MMOL/L (ref 98–107)
CO2 SERPL-SCNC: 31 MMOL/L (ref 20–31)
CREAT SERPL-MCNC: 1.96 MG/DL (ref 0.7–1.3)
EOSINOPHIL # BLD AUTO: 0 10 ^3/UL (ref 0–0.8)
EOSINOPHIL NFR BLD AUTO: 0 % (ref 0–7)
ERYTHROCYTE [DISTWIDTH] IN BLOOD BY AUTOMATED COUNT: 17.3 % (ref 11.8–14.3)
GLUCOSE SERPL-MCNC: 180 MG/DL (ref 74–106)
HCT VFR BLD AUTO: 36.6 % (ref 41–53)
HGB BLD-MCNC: 11.7 G/DL (ref 13.5–17.5)
LYMPHOCYTES # BLD AUTO: 0.7 10 ^3/UL (ref 0.4–5.4)
LYMPHOCYTES NFR BLD AUTO: 5.3 % (ref 10–50)
MCH RBC QN AUTO: 28.1 PG (ref 28–32)
MCHC RBC AUTO-ENTMCNC: 32 G/DL (ref 32–36)
MCV RBC AUTO: 87.9 FL (ref 80–100)
MONOCYTES # BLD AUTO: 0.7 10 ^3/UL (ref 0–1.3)
MONOCYTES NFR BLD AUTO: 4.9 % (ref 0–12)
NEUTROPHILS # BLD AUTO: 12.6 10 ^3/UL (ref 1.6–8.6)
NEUTROPHILS NFR BLD AUTO: 89.8 % (ref 37–80)
NRBC BLD QL AUTO: 0 %
PLATELET # BLD AUTO: 287 10^3/UL (ref 140–450)
POTASSIUM SERPL-SCNC: 3.6 MMOL/L (ref 3.5–5.1)
RBC # BLD AUTO: 4.16 10^6/UL (ref 4.5–5.9)
SODIUM SERPL-SCNC: 143 MMOL/L (ref 136–145)
WBC # BLD AUTO: 14.1 10^3/UL (ref 4.4–10.8)

## 2025-07-25 RX ADMIN — VANCOMYCIN ONE MLS/HR: 500 INJECTION, SOLUTION INTRAVENOUS at 18:29

## 2025-07-26 VITALS
TEMPERATURE: 97.4 F | SYSTOLIC BLOOD PRESSURE: 135 MMHG | RESPIRATION RATE: 17 BRPM | HEART RATE: 61 BPM | DIASTOLIC BLOOD PRESSURE: 74 MMHG | OXYGEN SATURATION: 96 %

## 2025-07-26 VITALS
OXYGEN SATURATION: 93 % | DIASTOLIC BLOOD PRESSURE: 63 MMHG | SYSTOLIC BLOOD PRESSURE: 109 MMHG | TEMPERATURE: 97.7 F | HEART RATE: 55 BPM | RESPIRATION RATE: 17 BRPM

## 2025-07-26 VITALS — OXYGEN SATURATION: 95 % | RESPIRATION RATE: 18 BRPM | HEART RATE: 52 BPM

## 2025-07-26 VITALS — OXYGEN SATURATION: 95 % | HEART RATE: 55 BPM

## 2025-07-26 VITALS
RESPIRATION RATE: 17 BRPM | DIASTOLIC BLOOD PRESSURE: 60 MMHG | HEART RATE: 61 BPM | OXYGEN SATURATION: 97 % | SYSTOLIC BLOOD PRESSURE: 116 MMHG | TEMPERATURE: 97.7 F

## 2025-07-26 VITALS
SYSTOLIC BLOOD PRESSURE: 138 MMHG | DIASTOLIC BLOOD PRESSURE: 68 MMHG | HEART RATE: 63 BPM | TEMPERATURE: 97.5 F | RESPIRATION RATE: 17 BRPM | OXYGEN SATURATION: 96 %

## 2025-07-26 VITALS
DIASTOLIC BLOOD PRESSURE: 68 MMHG | TEMPERATURE: 97.5 F | RESPIRATION RATE: 15 BRPM | HEART RATE: 95 BPM | SYSTOLIC BLOOD PRESSURE: 133 MMHG | OXYGEN SATURATION: 96 %

## 2025-07-26 VITALS
RESPIRATION RATE: 16 BRPM | HEART RATE: 66 BPM | OXYGEN SATURATION: 97 % | DIASTOLIC BLOOD PRESSURE: 60 MMHG | SYSTOLIC BLOOD PRESSURE: 118 MMHG | TEMPERATURE: 97 F

## 2025-07-26 LAB
ANION GAP SERPL CALCULATED.3IONS-SCNC: 8 MMOL/L (ref 5–15)
BASOPHILS # BLD AUTO: 0 10 ^3/UL (ref 0–0.2)
BASOPHILS NFR BLD AUTO: 0 % (ref 0–2)
BUN SERPL-MCNC: 58 MG/DL (ref 9–23)
BUN/CREAT SERPL: 31.2 (ref 10–20)
CALCIUM SERPL-MCNC: 9.1 MG/DL (ref 8.7–10.4)
CHLORIDE SERPL-SCNC: 107 MMOL/L (ref 98–107)
CO2 SERPL-SCNC: 29 MMOL/L (ref 20–31)
CREAT SERPL-MCNC: 1.86 MG/DL (ref 0.7–1.3)
EOSINOPHIL # BLD AUTO: 0 10 ^3/UL (ref 0–0.8)
EOSINOPHIL NFR BLD AUTO: 0 % (ref 0–7)
ERYTHROCYTE [DISTWIDTH] IN BLOOD BY AUTOMATED COUNT: 17.6 % (ref 11.8–14.3)
GLUCOSE SERPL-MCNC: 213 MG/DL (ref 74–106)
HCT VFR BLD AUTO: 35 % (ref 41–53)
HGB BLD-MCNC: 11.5 G/DL (ref 13.5–17.5)
LYMPHOCYTES # BLD AUTO: 0.6 10 ^3/UL (ref 0.4–5.4)
LYMPHOCYTES NFR BLD AUTO: 4.6 % (ref 10–50)
MCH RBC QN AUTO: 28.8 PG (ref 28–32)
MCHC RBC AUTO-ENTMCNC: 32.8 G/DL (ref 32–36)
MCV RBC AUTO: 87.9 FL (ref 80–100)
MONOCYTES # BLD AUTO: 0.7 10 ^3/UL (ref 0–1.3)
MONOCYTES NFR BLD AUTO: 5.6 % (ref 0–12)
NEUTROPHILS # BLD AUTO: 11.5 10 ^3/UL (ref 1.6–8.6)
NEUTROPHILS NFR BLD AUTO: 89.8 % (ref 37–80)
NRBC BLD QL AUTO: 0 %
PLATELET # BLD AUTO: 262 10^3/UL (ref 140–450)
POTASSIUM SERPL-SCNC: 3.3 MMOL/L (ref 3.5–5.1)
RBC # BLD AUTO: 3.98 10^6/UL (ref 4.5–5.9)
SODIUM SERPL-SCNC: 144 MMOL/L (ref 136–145)
WBC # BLD AUTO: 12.8 10^3/UL (ref 4.4–10.8)

## 2025-07-26 RX ADMIN — POTASSIUM CHLORIDE ONE MEQ: 1500 TABLET, EXTENDED RELEASE ORAL at 17:32

## 2025-07-26 RX ADMIN — VANCOMYCIN ONE MLS/HR: 500 INJECTION, SOLUTION INTRAVENOUS at 13:08

## 2025-07-27 VITALS
OXYGEN SATURATION: 98 % | HEART RATE: 56 BPM | RESPIRATION RATE: 20 BRPM | SYSTOLIC BLOOD PRESSURE: 114 MMHG | DIASTOLIC BLOOD PRESSURE: 69 MMHG | TEMPERATURE: 97.7 F

## 2025-07-27 VITALS
TEMPERATURE: 97.6 F | OXYGEN SATURATION: 98 % | HEART RATE: 65 BPM | DIASTOLIC BLOOD PRESSURE: 87 MMHG | RESPIRATION RATE: 22 BRPM | SYSTOLIC BLOOD PRESSURE: 151 MMHG

## 2025-07-27 VITALS
SYSTOLIC BLOOD PRESSURE: 123 MMHG | HEART RATE: 67 BPM | DIASTOLIC BLOOD PRESSURE: 75 MMHG | TEMPERATURE: 98.3 F | OXYGEN SATURATION: 99 % | RESPIRATION RATE: 16 BRPM

## 2025-07-27 VITALS
SYSTOLIC BLOOD PRESSURE: 107 MMHG | TEMPERATURE: 97.5 F | HEART RATE: 55 BPM | DIASTOLIC BLOOD PRESSURE: 62 MMHG | OXYGEN SATURATION: 95 % | RESPIRATION RATE: 22 BRPM

## 2025-07-27 VITALS — HEART RATE: 55 BPM | OXYGEN SATURATION: 95 %

## 2025-07-27 VITALS
DIASTOLIC BLOOD PRESSURE: 54 MMHG | OXYGEN SATURATION: 100 % | RESPIRATION RATE: 18 BRPM | HEART RATE: 55 BPM | SYSTOLIC BLOOD PRESSURE: 159 MMHG | TEMPERATURE: 98 F

## 2025-07-27 VITALS — OXYGEN SATURATION: 100 % | RESPIRATION RATE: 17 BRPM | HEART RATE: 78 BPM

## 2025-07-27 VITALS
RESPIRATION RATE: 16 BRPM | OXYGEN SATURATION: 95 % | HEART RATE: 54 BPM | DIASTOLIC BLOOD PRESSURE: 67 MMHG | TEMPERATURE: 97.9 F | SYSTOLIC BLOOD PRESSURE: 128 MMHG

## 2025-07-27 LAB
ANION GAP SERPL CALCULATED.3IONS-SCNC: 9 MMOL/L (ref 5–15)
BASOPHILS # BLD AUTO: 0 10 ^3/UL (ref 0–0.2)
BASOPHILS NFR BLD AUTO: 0.1 % (ref 0–2)
BUN SERPL-MCNC: 64 MG/DL (ref 9–23)
BUN/CREAT SERPL: 32 (ref 10–20)
CALCIUM SERPL-MCNC: 9.1 MG/DL (ref 8.7–10.4)
CHLORIDE SERPL-SCNC: 107 MMOL/L (ref 98–107)
CO2 SERPL-SCNC: 28 MMOL/L (ref 20–31)
CREAT SERPL-MCNC: 2 MG/DL (ref 0.7–1.3)
EOSINOPHIL # BLD AUTO: 0 10 ^3/UL (ref 0–0.8)
EOSINOPHIL NFR BLD AUTO: 0 % (ref 0–7)
ERYTHROCYTE [DISTWIDTH] IN BLOOD BY AUTOMATED COUNT: 17.6 % (ref 11.8–14.3)
GLUCOSE SERPL-MCNC: 233 MG/DL (ref 74–106)
HCT VFR BLD AUTO: 37.3 % (ref 41–53)
HGB BLD-MCNC: 12 G/DL (ref 13.5–17.5)
LYMPHOCYTES # BLD AUTO: 0.5 10 ^3/UL (ref 0.4–5.4)
LYMPHOCYTES NFR BLD AUTO: 3.4 % (ref 10–50)
MCH RBC QN AUTO: 28.4 PG (ref 28–32)
MCHC RBC AUTO-ENTMCNC: 32.1 G/DL (ref 32–36)
MCV RBC AUTO: 88.6 FL (ref 80–100)
MONOCYTES # BLD AUTO: 0.6 10 ^3/UL (ref 0–1.3)
MONOCYTES NFR BLD AUTO: 3.9 % (ref 0–12)
NEUTROPHILS # BLD AUTO: 13.2 10 ^3/UL (ref 1.6–8.6)
NEUTROPHILS NFR BLD AUTO: 92.6 % (ref 37–80)
NRBC BLD QL AUTO: 0.1 %
PLATELET # BLD AUTO: 297 10^3/UL (ref 140–450)
POTASSIUM SERPL-SCNC: 3.8 MMOL/L (ref 3.5–5.1)
RBC # BLD AUTO: 4.21 10^6/UL (ref 4.5–5.9)
SODIUM SERPL-SCNC: 144 MMOL/L (ref 136–145)
WBC # BLD AUTO: 14.2 10^3/UL (ref 4.4–10.8)

## 2025-07-27 PROCEDURE — B24BZZ4 ULTRASONOGRAPHY OF HEART WITH AORTA, TRANSESOPHAGEAL: ICD-10-PCS | Performed by: INTERNAL MEDICINE

## 2025-07-27 RX ADMIN — VANCOMYCIN HYDROCHLORIDE ONE MLS/HR: 750 INJECTION, POWDER, LYOPHILIZED, FOR SOLUTION INTRAVENOUS at 13:00

## 2025-07-28 VITALS
HEART RATE: 54 BPM | SYSTOLIC BLOOD PRESSURE: 112 MMHG | DIASTOLIC BLOOD PRESSURE: 52 MMHG | OXYGEN SATURATION: 97 % | TEMPERATURE: 97.7 F | RESPIRATION RATE: 17 BRPM

## 2025-07-28 VITALS
DIASTOLIC BLOOD PRESSURE: 65 MMHG | SYSTOLIC BLOOD PRESSURE: 110 MMHG | OXYGEN SATURATION: 97 % | RESPIRATION RATE: 18 BRPM | HEART RATE: 56 BPM

## 2025-07-28 VITALS
HEART RATE: 56 BPM | RESPIRATION RATE: 17 BRPM | SYSTOLIC BLOOD PRESSURE: 111 MMHG | DIASTOLIC BLOOD PRESSURE: 71 MMHG | OXYGEN SATURATION: 94 %

## 2025-07-28 VITALS
HEART RATE: 62 BPM | OXYGEN SATURATION: 99 % | DIASTOLIC BLOOD PRESSURE: 70 MMHG | RESPIRATION RATE: 22 BRPM | TEMPERATURE: 98 F | SYSTOLIC BLOOD PRESSURE: 136 MMHG

## 2025-07-28 VITALS
OXYGEN SATURATION: 98 % | HEART RATE: 75 BPM | TEMPERATURE: 97.8 F | RESPIRATION RATE: 17 BRPM | DIASTOLIC BLOOD PRESSURE: 73 MMHG | SYSTOLIC BLOOD PRESSURE: 140 MMHG

## 2025-07-28 VITALS
TEMPERATURE: 98 F | OXYGEN SATURATION: 95 % | HEART RATE: 53 BPM | SYSTOLIC BLOOD PRESSURE: 122 MMHG | DIASTOLIC BLOOD PRESSURE: 55 MMHG | RESPIRATION RATE: 16 BRPM

## 2025-07-28 VITALS
HEART RATE: 75 BPM | SYSTOLIC BLOOD PRESSURE: 121 MMHG | DIASTOLIC BLOOD PRESSURE: 67 MMHG | TEMPERATURE: 98.1 F | RESPIRATION RATE: 16 BRPM | OXYGEN SATURATION: 100 %

## 2025-07-28 VITALS — HEART RATE: 55 BPM | OXYGEN SATURATION: 95 %

## 2025-07-28 VITALS
DIASTOLIC BLOOD PRESSURE: 56 MMHG | RESPIRATION RATE: 16 BRPM | HEART RATE: 65 BPM | TEMPERATURE: 98.2 F | SYSTOLIC BLOOD PRESSURE: 103 MMHG | OXYGEN SATURATION: 99 %

## 2025-07-28 VITALS
SYSTOLIC BLOOD PRESSURE: 126 MMHG | RESPIRATION RATE: 16 BRPM | DIASTOLIC BLOOD PRESSURE: 66 MMHG | HEART RATE: 61 BPM | OXYGEN SATURATION: 99 % | TEMPERATURE: 98.4 F

## 2025-07-28 VITALS — OXYGEN SATURATION: 95 % | HEART RATE: 74 BPM

## 2025-07-28 LAB
ANION GAP SERPL CALCULATED.3IONS-SCNC: 7 MMOL/L (ref 5–15)
APTT PPP: 25.3 SEC (ref 24.5–34.5)
BACTERIA UR QL AUTO: (no result) /HPF
BASOPHILS # BLD AUTO: 0 10 ^3/UL (ref 0–0.2)
BASOPHILS NFR BLD AUTO: 0.1 % (ref 0–2)
BUN SERPL-MCNC: 56 MG/DL (ref 9–23)
BUN/CREAT SERPL: 29.5 (ref 10–20)
CALCIUM SERPL-MCNC: 8.3 MG/DL (ref 8.7–10.4)
CASTS URNS QL MICRO: (no result) /LPF
CELLULAR CAST: (no result) /HPF
CHLORIDE SERPL-SCNC: 110 MMOL/L (ref 98–107)
CLARITY UR: CLEAR
CO2 SERPL-SCNC: 25 MMOL/L (ref 20–31)
COARSE GRAN CASTS URNS QL MICRO: (no result) /LPF
COLOR UR: (no result)
CREAT SERPL-MCNC: 1.9 MG/DL (ref 0.7–1.3)
CRYSTALS UR QL AUTO: (no result) /HPF
CRYSTALS URNS MICRO: (no result) /HPF
EOSINOPHIL # BLD AUTO: 0 10 ^3/UL (ref 0–0.8)
EOSINOPHIL NFR BLD AUTO: 0 % (ref 0–7)
ERYTHROCYTE [DISTWIDTH] IN BLOOD BY AUTOMATED COUNT: 18.3 % (ref 11.8–14.3)
FINE GRAN CASTS #/AREA URNS AUTO: (no result) /LPF
GLUCOSE SERPL-MCNC: 208 MG/DL (ref 74–106)
HCT VFR BLD AUTO: 36.8 % (ref 41–53)
HGB BLD-MCNC: 11.6 G/DL (ref 13.5–17.5)
HGB UR QL STRIP: NEGATIVE /UL
HYALINE CASTS UR QL AUTO: (no result) /LPF (ref 0–2)
INR PPP: 1.01 (ref 0.9–1.15)
LEUCINE CRYSTALS [PRESENCE] IN URINE BY COMPUTER ASSISTED METHOD: (no result) /HPF
LYMPHOCYTES # BLD AUTO: 0.5 10 ^3/UL (ref 0.4–5.4)
LYMPHOCYTES NFR BLD AUTO: 3.3 % (ref 10–50)
MCH RBC QN AUTO: 28.1 PG (ref 28–32)
MCHC RBC AUTO-ENTMCNC: 31.5 G/DL (ref 32–36)
MCV RBC AUTO: 89.2 FL (ref 80–100)
MONOCYTES # BLD AUTO: 0.7 10 ^3/UL (ref 0–1.3)
MONOCYTES NFR BLD AUTO: 4.4 % (ref 0–12)
MUCOUS THREADS UR QL AUTO: (no result)
MUCOUS THREADS UR QL AUTO: (no result) /HPF (ref ?–5)
NEUTROPHILS # BLD AUTO: 14.5 10 ^3/UL (ref 1.6–8.6)
NEUTROPHILS NFR BLD AUTO: 92.2 % (ref 37–80)
NRBC BLD QL AUTO: 0 %
PH UR: 6 [PH] (ref 5–9)
PLATELET # BLD AUTO: 300 10^3/UL (ref 140–450)
POTASSIUM SERPL-SCNC: 4.3 MMOL/L (ref 3.5–5.1)
PROT UR STRIP.AUTO-MCNC: NEGATIVE MG/DL
PROTHROMBIN TIME: 10.7 SEC (ref 9.3–11.8)
RBC # BLD AUTO: 4.13 10^6/UL (ref 4.5–5.9)
SODIUM SERPL-SCNC: 142 MMOL/L (ref 136–145)
SP GR UR STRIP: 1.02 (ref 1–1.03)
SPERM UR QL AUTO: (no result) /HPF
STARCH GRANULES URNS QL MICRO: (no result)
URINE SQUAMOUS EPITHELIAL CELL: (no result) /HPF (ref ?–5)
UROBILINOGEN UR QL: NORMAL MG/DL
WBC # BLD AUTO: 15.7 10^3/UL (ref 4.4–10.8)
WBC #/AREA URNS AUTO: < 1 /HPF (ref 0–3)
WBC CLUMPS UR QL AUTO: (no result) /HPF
YEAST # UR AUTO: (no result) /HPF
YEAST HYPHAE UR QL COMP ASSIST: (no result) /HPF

## 2025-07-28 RX ADMIN — VANCOMYCIN HYDROCHLORIDE ONE MLS/HR: 1 INJECTION, POWDER, LYOPHILIZED, FOR SOLUTION INTRAVENOUS at 14:15

## 2025-07-28 RX ADMIN — LIDOCAINE HYDROCHLORIDE ONE ML: 20 SOLUTION ORAL; TOPICAL at 07:15

## 2025-07-28 RX ADMIN — MIDAZOLAM HYDROCHLORIDE ONE MG: 1 INJECTION, SOLUTION INTRAMUSCULAR; INTRAVENOUS at 07:15

## 2025-07-28 RX ADMIN — FENTANYL CITRATE ONE MCG: 50 INJECTION, SOLUTION INTRAMUSCULAR; INTRAVENOUS at 11:26

## 2025-07-29 VITALS
DIASTOLIC BLOOD PRESSURE: 83 MMHG | HEART RATE: 60 BPM | SYSTOLIC BLOOD PRESSURE: 137 MMHG | OXYGEN SATURATION: 100 % | TEMPERATURE: 96.1 F | RESPIRATION RATE: 17 BRPM

## 2025-07-29 VITALS — HEART RATE: 57 BPM | OXYGEN SATURATION: 95 % | RESPIRATION RATE: 17 BRPM

## 2025-07-29 VITALS
HEART RATE: 56 BPM | TEMPERATURE: 96.2 F | SYSTOLIC BLOOD PRESSURE: 136 MMHG | RESPIRATION RATE: 19 BRPM | DIASTOLIC BLOOD PRESSURE: 68 MMHG | OXYGEN SATURATION: 100 %

## 2025-07-29 VITALS
RESPIRATION RATE: 20 BRPM | SYSTOLIC BLOOD PRESSURE: 138 MMHG | OXYGEN SATURATION: 100 % | HEART RATE: 69 BPM | TEMPERATURE: 98.2 F | DIASTOLIC BLOOD PRESSURE: 81 MMHG

## 2025-07-29 VITALS
RESPIRATION RATE: 17 BRPM | HEART RATE: 87 BPM | TEMPERATURE: 97 F | OXYGEN SATURATION: 100 % | DIASTOLIC BLOOD PRESSURE: 65 MMHG | SYSTOLIC BLOOD PRESSURE: 111 MMHG

## 2025-07-29 VITALS
TEMPERATURE: 97 F | OXYGEN SATURATION: 98 % | RESPIRATION RATE: 19 BRPM | HEART RATE: 56 BPM | SYSTOLIC BLOOD PRESSURE: 117 MMHG | DIASTOLIC BLOOD PRESSURE: 71 MMHG

## 2025-07-29 LAB
BASOPHILS # BLD AUTO: 0 10 ^3/UL (ref 0–0.2)
BASOPHILS NFR BLD AUTO: 0.1 % (ref 0–2)
CREAT SERPL-MCNC: 1.8 MG/DL (ref 0.7–1.3)
EOSINOPHIL # BLD AUTO: 0 10 ^3/UL (ref 0–0.8)
EOSINOPHIL NFR BLD AUTO: 0 % (ref 0–7)
ERYTHROCYTE [DISTWIDTH] IN BLOOD BY AUTOMATED COUNT: 18 % (ref 11.8–14.3)
HCT VFR BLD AUTO: 36.5 % (ref 41–53)
HGB BLD-MCNC: 11.8 G/DL (ref 13.5–17.5)
LYMPHOCYTES # BLD AUTO: 0.5 10 ^3/UL (ref 0.4–5.4)
LYMPHOCYTES NFR BLD AUTO: 3.5 % (ref 10–50)
MCH RBC QN AUTO: 29 PG (ref 28–32)
MCHC RBC AUTO-ENTMCNC: 32.4 G/DL (ref 32–36)
MCV RBC AUTO: 89.6 FL (ref 80–100)
MONOCYTES # BLD AUTO: 0.9 10 ^3/UL (ref 0–1.3)
MONOCYTES NFR BLD AUTO: 6 % (ref 0–12)
NEUTROPHILS # BLD AUTO: 13.5 10 ^3/UL (ref 1.6–8.6)
NEUTROPHILS NFR BLD AUTO: 90.4 % (ref 37–80)
NRBC BLD QL AUTO: 0 %
PLATELET # BLD AUTO: 286 10^3/UL (ref 140–450)
RBC # BLD AUTO: 4.07 10^6/UL (ref 4.5–5.9)
WBC # BLD AUTO: 15 10^3/UL (ref 4.4–10.8)

## 2025-07-29 RX ADMIN — VANCOMYCIN ONE MLS/HR: 500 INJECTION, SOLUTION INTRAVENOUS at 11:00
